# Patient Record
Sex: FEMALE | Race: BLACK OR AFRICAN AMERICAN | NOT HISPANIC OR LATINO | URBAN - METROPOLITAN AREA
[De-identification: names, ages, dates, MRNs, and addresses within clinical notes are randomized per-mention and may not be internally consistent; named-entity substitution may affect disease eponyms.]

---

## 2017-07-31 ENCOUNTER — INPATIENT (INPATIENT)
Facility: HOSPITAL | Age: 22
LOS: 2 days | Discharge: HOME | DRG: 546 | End: 2017-08-03
Attending: INTERNAL MEDICINE | Admitting: INTERNAL MEDICINE
Payer: COMMERCIAL

## 2017-07-31 VITALS
DIASTOLIC BLOOD PRESSURE: 68 MMHG | OXYGEN SATURATION: 97 % | SYSTOLIC BLOOD PRESSURE: 102 MMHG | HEIGHT: 66 IN | HEART RATE: 103 BPM | RESPIRATION RATE: 20 BRPM | WEIGHT: 117.95 LBS | TEMPERATURE: 101 F

## 2017-07-31 RX ORDER — KETOROLAC TROMETHAMINE 30 MG/ML
30 SYRINGE (ML) INJECTION ONCE
Qty: 0 | Refills: 0 | Status: DISCONTINUED | OUTPATIENT
Start: 2017-07-31 | End: 2017-07-31

## 2017-07-31 NOTE — ED ADULT TRIAGE NOTE - CHIEF COMPLAINT QUOTE
came in with c/o joint pain sent for further evaluation from urgent care cause pt is also c/o palpitations and chills

## 2017-08-01 DIAGNOSIS — N39.0 URINARY TRACT INFECTION, SITE NOT SPECIFIED: ICD-10-CM

## 2017-08-01 DIAGNOSIS — M25.50 PAIN IN UNSPECIFIED JOINT: ICD-10-CM

## 2017-08-01 DIAGNOSIS — Z29.9 ENCOUNTER FOR PROPHYLACTIC MEASURES, UNSPECIFIED: ICD-10-CM

## 2017-08-01 LAB
ALBUMIN SERPL ELPH-MCNC: 2.6 G/DL — LOW (ref 3.5–5)
ALBUMIN SERPL ELPH-MCNC: 2.8 G/DL — LOW (ref 3.5–5)
ALP SERPL-CCNC: 72 U/L — SIGNIFICANT CHANGE UP (ref 40–120)
ALP SERPL-CCNC: 78 U/L — SIGNIFICANT CHANGE UP (ref 40–120)
ALT FLD-CCNC: 21 U/L DA — SIGNIFICANT CHANGE UP (ref 10–60)
ALT FLD-CCNC: 22 U/L DA — SIGNIFICANT CHANGE UP (ref 10–60)
ANION GAP SERPL CALC-SCNC: 5 MMOL/L — SIGNIFICANT CHANGE UP (ref 5–17)
ANION GAP SERPL CALC-SCNC: 5 MMOL/L — SIGNIFICANT CHANGE UP (ref 5–17)
ANTI-RIBONUCLEAR PROTEIN: 6.6 AI — HIGH
APPEARANCE UR: CLEAR — SIGNIFICANT CHANGE UP
AST SERPL-CCNC: 28 U/L — SIGNIFICANT CHANGE UP (ref 10–40)
AST SERPL-CCNC: 29 U/L — SIGNIFICANT CHANGE UP (ref 10–40)
BASOPHILS # BLD AUTO: 0 K/UL — SIGNIFICANT CHANGE UP (ref 0–0.2)
BASOPHILS # BLD AUTO: 0 K/UL — SIGNIFICANT CHANGE UP (ref 0–0.2)
BASOPHILS NFR BLD AUTO: 0.4 % — SIGNIFICANT CHANGE UP (ref 0–2)
BASOPHILS NFR BLD AUTO: 1 % — SIGNIFICANT CHANGE UP (ref 0–2)
BILIRUB SERPL-MCNC: 0.3 MG/DL — SIGNIFICANT CHANGE UP (ref 0.2–1.2)
BILIRUB SERPL-MCNC: 0.4 MG/DL — SIGNIFICANT CHANGE UP (ref 0.2–1.2)
BILIRUB UR-MCNC: NEGATIVE — SIGNIFICANT CHANGE UP
BUN SERPL-MCNC: 8 MG/DL — SIGNIFICANT CHANGE UP (ref 7–18)
BUN SERPL-MCNC: 9 MG/DL — SIGNIFICANT CHANGE UP (ref 7–18)
C TRACH RRNA SPEC QL NAA+PROBE: SIGNIFICANT CHANGE UP
CALCIUM SERPL-MCNC: 8.1 MG/DL — LOW (ref 8.4–10.5)
CALCIUM SERPL-MCNC: 8.3 MG/DL — LOW (ref 8.4–10.5)
CCP IGG SERPL-ACNC: 11 UNITS — SIGNIFICANT CHANGE UP (ref 0–19)
CHLORIDE SERPL-SCNC: 102 MMOL/L — SIGNIFICANT CHANGE UP (ref 96–108)
CHLORIDE SERPL-SCNC: 105 MMOL/L — SIGNIFICANT CHANGE UP (ref 96–108)
CHOLEST SERPL-MCNC: 123 MG/DL — SIGNIFICANT CHANGE UP (ref 10–199)
CK SERPL-CCNC: 47 U/L — SIGNIFICANT CHANGE UP (ref 21–215)
CO2 SERPL-SCNC: 28 MMOL/L — SIGNIFICANT CHANGE UP (ref 22–31)
CO2 SERPL-SCNC: 30 MMOL/L — SIGNIFICANT CHANGE UP (ref 22–31)
COLOR SPEC: YELLOW — SIGNIFICANT CHANGE UP
CREAT SERPL-MCNC: 0.68 MG/DL — SIGNIFICANT CHANGE UP (ref 0.5–1.3)
CREAT SERPL-MCNC: 0.73 MG/DL — SIGNIFICANT CHANGE UP (ref 0.5–1.3)
DIFF PNL FLD: NEGATIVE — SIGNIFICANT CHANGE UP
ENA SM AB FLD QL: >8 AI — HIGH
EOSINOPHIL # BLD AUTO: 0.1 K/UL — SIGNIFICANT CHANGE UP (ref 0–0.5)
EOSINOPHIL # BLD AUTO: 0.2 K/UL — SIGNIFICANT CHANGE UP (ref 0–0.5)
EOSINOPHIL NFR BLD AUTO: 3.4 % — SIGNIFICANT CHANGE UP (ref 0–6)
EOSINOPHIL NFR BLD AUTO: 3.6 % — SIGNIFICANT CHANGE UP (ref 0–6)
ERYTHROCYTE [SEDIMENTATION RATE] IN BLOOD: 82 MM/HR — HIGH (ref 0–15)
FOLATE SERPL-MCNC: 13.1 NG/ML — SIGNIFICANT CHANGE UP (ref 4.8–24.2)
GLUCOSE SERPL-MCNC: 102 MG/DL — HIGH (ref 70–99)
GLUCOSE SERPL-MCNC: 89 MG/DL — SIGNIFICANT CHANGE UP (ref 70–99)
GLUCOSE UR QL: NEGATIVE — SIGNIFICANT CHANGE UP
HAV IGM SER-ACNC: SIGNIFICANT CHANGE UP
HBA1C BLD-MCNC: 5.4 % — SIGNIFICANT CHANGE UP (ref 4–5.6)
HBV CORE IGM SER-ACNC: SIGNIFICANT CHANGE UP
HBV SURFACE AG SER-ACNC: SIGNIFICANT CHANGE UP
HCG UR QL: NEGATIVE — SIGNIFICANT CHANGE UP
HCT VFR BLD CALC: 33.5 % — LOW (ref 34.5–45)
HCT VFR BLD CALC: 35.7 % — SIGNIFICANT CHANGE UP (ref 34.5–45)
HCV AB S/CO SERPL IA: 0.13 S/CO — SIGNIFICANT CHANGE UP
HCV AB SERPL-IMP: SIGNIFICANT CHANGE UP
HDLC SERPL-MCNC: 31 MG/DL — LOW (ref 40–125)
HGB BLD-MCNC: 10.9 G/DL — LOW (ref 11.5–15.5)
HGB BLD-MCNC: 11.6 G/DL — SIGNIFICANT CHANGE UP (ref 11.5–15.5)
KETONES UR-MCNC: NEGATIVE — SIGNIFICANT CHANGE UP
LEUKOCYTE ESTERASE UR-ACNC: NEGATIVE — SIGNIFICANT CHANGE UP
LIPID PNL WITH DIRECT LDL SERPL: 78 MG/DL — SIGNIFICANT CHANGE UP
LYMPHOCYTES # BLD AUTO: 1 K/UL — SIGNIFICANT CHANGE UP (ref 1–3.3)
LYMPHOCYTES # BLD AUTO: 1.3 K/UL — SIGNIFICANT CHANGE UP (ref 1–3.3)
LYMPHOCYTES # BLD AUTO: 27 % — SIGNIFICANT CHANGE UP (ref 13–44)
LYMPHOCYTES # BLD AUTO: 28.6 % — SIGNIFICANT CHANGE UP (ref 13–44)
MAGNESIUM SERPL-MCNC: 2.1 MG/DL — SIGNIFICANT CHANGE UP (ref 1.6–2.6)
MCHC RBC-ENTMCNC: 29.2 PG — SIGNIFICANT CHANGE UP (ref 27–34)
MCHC RBC-ENTMCNC: 29.2 PG — SIGNIFICANT CHANGE UP (ref 27–34)
MCHC RBC-ENTMCNC: 32.6 GM/DL — SIGNIFICANT CHANGE UP (ref 32–36)
MCHC RBC-ENTMCNC: 32.6 GM/DL — SIGNIFICANT CHANGE UP (ref 32–36)
MCV RBC AUTO: 89.4 FL — SIGNIFICANT CHANGE UP (ref 80–100)
MCV RBC AUTO: 89.6 FL — SIGNIFICANT CHANGE UP (ref 80–100)
MONOCYTES # BLD AUTO: 0.2 K/UL — SIGNIFICANT CHANGE UP (ref 0–0.9)
MONOCYTES # BLD AUTO: 0.3 K/UL — SIGNIFICANT CHANGE UP (ref 0–0.9)
MONOCYTES NFR BLD AUTO: 5.1 % — SIGNIFICANT CHANGE UP (ref 2–14)
MONOCYTES NFR BLD AUTO: 5.7 % — SIGNIFICANT CHANGE UP (ref 2–14)
N GONORRHOEA RRNA SPEC QL NAA+PROBE: SIGNIFICANT CHANGE UP
NEUTROPHILS # BLD AUTO: 2.4 K/UL — SIGNIFICANT CHANGE UP (ref 1.8–7.4)
NEUTROPHILS # BLD AUTO: 2.9 K/UL — SIGNIFICANT CHANGE UP (ref 1.8–7.4)
NEUTROPHILS NFR BLD AUTO: 61.2 % — SIGNIFICANT CHANGE UP (ref 43–77)
NEUTROPHILS NFR BLD AUTO: 64 % — SIGNIFICANT CHANGE UP (ref 43–77)
NITRITE UR-MCNC: NEGATIVE — SIGNIFICANT CHANGE UP
PH UR: 8 — SIGNIFICANT CHANGE UP (ref 5–8)
PHOSPHATE SERPL-MCNC: 4.1 MG/DL — SIGNIFICANT CHANGE UP (ref 2.5–4.5)
PLATELET # BLD AUTO: 205 K/UL — SIGNIFICANT CHANGE UP (ref 150–400)
PLATELET # BLD AUTO: 228 K/UL — SIGNIFICANT CHANGE UP (ref 150–400)
POTASSIUM SERPL-MCNC: 4 MMOL/L — SIGNIFICANT CHANGE UP (ref 3.5–5.3)
POTASSIUM SERPL-MCNC: 4 MMOL/L — SIGNIFICANT CHANGE UP (ref 3.5–5.3)
POTASSIUM SERPL-SCNC: 4 MMOL/L — SIGNIFICANT CHANGE UP (ref 3.5–5.3)
POTASSIUM SERPL-SCNC: 4 MMOL/L — SIGNIFICANT CHANGE UP (ref 3.5–5.3)
PROT SERPL-MCNC: 8.9 G/DL — HIGH (ref 6–8.3)
PROT SERPL-MCNC: 9.3 G/DL — HIGH (ref 6–8.3)
PROT UR-MCNC: NEGATIVE — SIGNIFICANT CHANGE UP
RBC # BLD: 3.74 M/UL — LOW (ref 3.8–5.2)
RBC # BLD: 3.99 M/UL — SIGNIFICANT CHANGE UP (ref 3.8–5.2)
RBC # FLD: 12.2 % — SIGNIFICANT CHANGE UP (ref 10.3–14.5)
RBC # FLD: 12.4 % — SIGNIFICANT CHANGE UP (ref 10.3–14.5)
RF+CCP IGG SER-IMP: NEGATIVE — SIGNIFICANT CHANGE UP
RHEUMATOID FACT SERPL-ACNC: 15 IU/ML — HIGH (ref 0–13.9)
SODIUM SERPL-SCNC: 137 MMOL/L — SIGNIFICANT CHANGE UP (ref 135–145)
SODIUM SERPL-SCNC: 138 MMOL/L — SIGNIFICANT CHANGE UP (ref 135–145)
SP GR SPEC: 1.01 — SIGNIFICANT CHANGE UP (ref 1.01–1.02)
SPECIMEN SOURCE: SIGNIFICANT CHANGE UP
TOTAL CHOLESTEROL/HDL RATIO MEASUREMENT: 4 RATIO — SIGNIFICANT CHANGE UP (ref 3.3–7.1)
TRIGL SERPL-MCNC: 72 MG/DL — SIGNIFICANT CHANGE UP (ref 10–149)
TSH SERPL-MCNC: 3.65 UU/ML — SIGNIFICANT CHANGE UP (ref 0.34–4.82)
URATE SERPL-MCNC: 3.1 MG/DL — SIGNIFICANT CHANGE UP (ref 2.5–7)
UROBILINOGEN FLD QL: NEGATIVE — SIGNIFICANT CHANGE UP
VIT B12 SERPL-MCNC: 341 PG/ML — SIGNIFICANT CHANGE UP (ref 243–894)
WBC # BLD: 3.7 K/UL — LOW (ref 3.8–10.5)
WBC # BLD: 4.7 K/UL — SIGNIFICANT CHANGE UP (ref 3.8–10.5)
WBC # FLD AUTO: 3.7 K/UL — LOW (ref 3.8–10.5)
WBC # FLD AUTO: 4.7 K/UL — SIGNIFICANT CHANGE UP (ref 3.8–10.5)

## 2017-08-01 PROCEDURE — 71020: CPT | Mod: 26

## 2017-08-01 PROCEDURE — 99285 EMERGENCY DEPT VISIT HI MDM: CPT | Mod: 25

## 2017-08-01 PROCEDURE — 73030 X-RAY EXAM OF SHOULDER: CPT | Mod: 26,50

## 2017-08-01 PROCEDURE — 73130 X-RAY EXAM OF HAND: CPT | Mod: 26,50

## 2017-08-01 PROCEDURE — 73522 X-RAY EXAM HIPS BI 3-4 VIEWS: CPT | Mod: 26

## 2017-08-01 RX ORDER — SODIUM CHLORIDE 9 MG/ML
1000 INJECTION INTRAMUSCULAR; INTRAVENOUS; SUBCUTANEOUS
Qty: 0 | Refills: 0 | Status: DISCONTINUED | OUTPATIENT
Start: 2017-08-01 | End: 2017-08-03

## 2017-08-01 RX ORDER — AZITHROMYCIN 500 MG/1
100 TABLET, FILM COATED ORAL ONCE
Qty: 0 | Refills: 0 | Status: DISCONTINUED | OUTPATIENT
Start: 2017-08-01 | End: 2017-08-01

## 2017-08-01 RX ORDER — IBUPROFEN 200 MG
400 TABLET ORAL
Qty: 0 | Refills: 0 | Status: DISCONTINUED | OUTPATIENT
Start: 2017-08-01 | End: 2017-08-03

## 2017-08-01 RX ORDER — CEFTRIAXONE 500 MG/1
1 INJECTION, POWDER, FOR SOLUTION INTRAMUSCULAR; INTRAVENOUS ONCE
Qty: 0 | Refills: 0 | Status: COMPLETED | OUTPATIENT
Start: 2017-08-01 | End: 2017-08-01

## 2017-08-01 RX ORDER — AZITHROMYCIN 500 MG/1
1000 TABLET, FILM COATED ORAL ONCE
Qty: 0 | Refills: 0 | Status: COMPLETED | OUTPATIENT
Start: 2017-08-01 | End: 2017-08-01

## 2017-08-01 RX ADMIN — Medication 400 MILLIGRAM(S): at 18:13

## 2017-08-01 RX ADMIN — Medication 40 MILLIGRAM(S): at 14:48

## 2017-08-01 RX ADMIN — CEFTRIAXONE 100 GRAM(S): 500 INJECTION, POWDER, FOR SOLUTION INTRAMUSCULAR; INTRAVENOUS at 02:30

## 2017-08-01 RX ADMIN — AZITHROMYCIN 1000 MILLIGRAM(S): 500 TABLET, FILM COATED ORAL at 02:31

## 2017-08-01 RX ADMIN — Medication 40 MILLIGRAM(S): at 22:27

## 2017-08-01 RX ADMIN — Medication 30 MILLIGRAM(S): at 00:21

## 2017-08-01 RX ADMIN — Medication 30 MILLIGRAM(S): at 00:16

## 2017-08-01 RX ADMIN — SODIUM CHLORIDE 75 MILLILITER(S): 9 INJECTION INTRAMUSCULAR; INTRAVENOUS; SUBCUTANEOUS at 22:27

## 2017-08-01 RX ADMIN — Medication 400 MILLIGRAM(S): at 14:48

## 2017-08-01 RX ADMIN — SODIUM CHLORIDE 75 MILLILITER(S): 9 INJECTION INTRAMUSCULAR; INTRAVENOUS; SUBCUTANEOUS at 06:01

## 2017-08-01 NOTE — H&P ADULT - ATTENDING COMMENTS
22 Y/O F, lives with grandparents, no PMHx , presented with multiple joint pain , x 2 weeks. According to patient she has muscle spasm about 6 months ago , given the muscle relaxant that results in mild improvement. About 2 weeks ago she noticed right and left shoulder, right hip joint pain that started about 2 weeks ago, associated with difficulty in performing daily activities , walking, dressing, unable to lie down.  6 month ago she has bilateral bursitis of elbow, treated with conservative measures. About couple of weeks ago she went to urgent care to similar symptoms , also she has headache at that time , CT scan done that was negative. She is treated for UTI about a week ago, given the nitrofurantoin BID x 7 days , remaining treatment for 3 days. She is in monogamous relationship with partner since last 3 yrs, uses protection infrequently. Denies any fever, runny nose, neck stiffness, chest pain, cough, abdominal pain, travel, hiking, rash , tick bite, skin lesions, vaginal discharge vaginal lesions, similar complains in partner, family history of joint disease or autoimmune conditions, joint pain migrating from one joint to other, photosensitivity , chest pain .   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 She was sent to rheumatologist yesterday by her PCP, after seen in her office recommended to go to ED for further evaluation As per ED note ( Pt sent in for possible disseminated gonorrhea vs. lyme' s disease) .     pt seen in bed, vitals stable except for fever, physical exam reveals lungs cta b/l, heart s1s2, abd soft nd nt bs+, ext pip swelling and tenderness, hip tender and tenderness on flexion, no edema. labs and diagnostic test result reviewed.    assessment  --  polyarthritis, r/o viral synd, r/o rheumatalogical disorder, r/o scd, r/o infectious process     plan  --  admit to med, solumedrol  cont preadmit home meds, gi and dvt profilaxis,  cbc, bmp, mg, phos, lipids, tsh, bld cx, ua, ucx, KENAN, CRP, CCP, RF, Hep panel, aldolase, CK, also Chlamydia GC antibodies, borrelia antibodies, anti smith, c3, c4, antids dna, antismith, spep    f/u xray hands, shoulder, hips    rheum cons

## 2017-08-01 NOTE — PROGRESS NOTE ADULT - SUBJECTIVE AND OBJECTIVE BOX
PHYSICAL EXAM:  GENERAL: NAD, A&O X 3  NERVOUS SYSTEM:  Alert & Oriented X3, Good concentration; Motor Strength 5/5 B/L upper and lower extremities; DTRs 2+ intact and symmetric  CHEST/LUNG: Clear to percussion bilaterally; No rales, rhonchi, wheezing, or rubs  HEART: Regular rate and rhythm; No murmurs, rubs, or gallops  ABDOMEN: Soft, Nontender, Nondistended; Bowel sounds present  EXTREMITIES:  2+ Peripheral Pulses, No clubbing, cyanosis, or edema  LYMPH: No lymphadenopathy noted  SKIN: No rashes or lesions

## 2017-08-01 NOTE — H&P ADULT - NSHPLABSRESULTS_GEN_ALL_CORE
11.6   4.7   )-----------( 228      ( 01 Aug 2017 00:29 )             35.7   08-    137  |  102  |  9   ----------------------------<  89  4.0   |  30  |  0.68    Ca    8.1<L>      01 Aug 2017 00:29    TPro  9.3<H>  /  Alb  2.8<L>  /  TBili  0.3  /  DBili  x   /  AST  29  /  ALT  22  /  AlkPhos  78  08-  Urinalysis Basic - ( 01 Aug 2017 00:29 )    Color: Yellow / Appearance: Clear / S.015 / pH: x  Gluc: x / Ketone: Negative  / Bili: Negative / Urobili: Negative   Blood: x / Protein: Negative / Nitrite: Negative   Leuk Esterase: Negative / RBC: x / WBC x   Sq Epi: x / Non Sq Epi: x / Bacteria: x

## 2017-08-01 NOTE — H&P ADULT - NSHPPHYSICALEXAM_GEN_ALL_CORE
PHYSICAL EXAM:  GENERAL: NAD,   HEAD:  , Normocephalic  EYES:  conjunctiva and sclera clear  NECK: Supple, No JVD    NERVOUS SYSTEM:  Alert & Oriented X3,   CHEST/LUNG: Clear to auscultation bilaterally;   HEART: S1 S2+;   ABDOMEN: Soft, Nontender, Nondistended; Bowel sounds present  EXTREMITIES: no cyanosis; no edema; no calf tenderness  Joint: Right and left shoulder , left hip pain

## 2017-08-01 NOTE — PROGRESS NOTE ADULT - ASSESSMENT
20 Y/O F, lives with grandparents, no PMHx , presented with multiple joint pain, x 2 weeks. According to patient she has muscle spasm about 6 months ago, given the muscle relaxant that results in mild improvement. About 2 weeks ago she noticed right and left shoulder, right hip joint pain, associated with difficulty in performing daily activities  walking, dressing, unable to lie down.  6 month ago she had bilateral bursitis of elbow, treated with conservative measures. About two weeks ago she went to urgent care to similar symptoms , also she had headache at that time. CT scan done was negative. She completed treatment for a UTI one week ago with nitrofurantoin, BID X 7 days.    She is in monogamous relationship with partner for 3 yrs, uses protection infrequently. She denied fever, runny nose, neck stiffness, chest pain, cough, abdominal pain, travel, hiking, rash , tick bite, skin lesions, vaginal discharge vaginal lesions, similar complaints in partner, family history of joint disease or autoimmune conditions, joint pain migrating from one joint to other, photosensitivity , chest pain.     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  She was sent to rheumatologist one day ago who sent her to the ED.      In the ED patient vitals shows T max of 101.5, , RR20.  UA negative and received Zithromax, Rocephin, Toradol X 1.. ESR 82.

## 2017-08-01 NOTE — PROGRESS NOTE ADULT - PROBLEM SELECTOR PLAN 1
Multiple joint pain. (left hip, bilateral shoulders).  Morning stiffness, muscle pain, interfering with daily activities   Physical exam pertinent for tenderness of bilateral wrist, no erythema, warmth left hip and bilateral shoulder.  Multiple labs ordered/pending including CTP, KENAN, DS DNA, Complimen C3/C4  KAZ, SSA/SSB, Ace level pending  X-ray bilateral shoulder showed:  " No evidence for an acute fracture, or dislocation.The joint spaces are preserved.  The osseous mineralization is within normal limits.  No abnormal soft tissue calcification."  X-rays bilateral hip showed:  "No evidence for an acute fracture, or dislocation.  The hip joint spaces are preserved bilaterally.  The SI joints appear unremarkable. The osseous mineralization is within normal limits."  CXR showed:  "No radiographic evidence for acute cardiopulmonary disease."  F/up on KENAN, CRP, CCP, RF, Hep panel, aldolase, CK, also Chlamydia GC antibodies, borrelia antibodies.   Dr. Storey, attending, evaluated  Pt started on NSAIDs/steroids

## 2017-08-01 NOTE — H&P ADULT - PROBLEM SELECTOR PLAN 1
Multiple joint pain ( < 3 , left hip pain, right and left shoulder pain)   Sexually active, monogamous   Morning stiffness, muscle pain, interfering with daily activities   Physical exam pertinent for tenderness of bilateral wrist , no erythema, warmth of joint ( right and left shoulder, left hip joint )   Sent by rheumatologist for concern of disseminated gonorrhea vs. lyme' s disease, as by ED note   patient denies any vaginal symptoms, rash, skin lesions  Fever of about 101 , Hr 103   ESR elevated to 81  Patient got the Zithromax and Rocephin for the concern of disseminate gonnorrhea infection  F/up on KENAN, CRP, CCP, RF, Hep panel, aldolase, CK, also Chlamydia GC antibodies, borrelia antibodies Multiple joint pain ( < 3 , left hip pain, right and left shoulder pain)   Sexually active, monogamous   Morning stiffness, muscle pain, interfering with daily activities   Physical exam pertinent for tenderness of bilateral wrist , no erythema, warmth of joint ( right and left shoulder, left hip joint )   Sent by rheumatologist for concern of disseminated gonorrhea vs. lyme' s disease, as by ED note   patient denies any vaginal symptoms, rash, skin lesions  Fever of about 101 , Hr 103   ESR elevated to 81  Patient got the Zithromax and Rocephin for the concern of disseminate gonorrhea infection  F/up on KENAN, CRP, CCP, RF, Hep panel, aldolase, CK, also Chlamydia GC antibodies, borrelia antibodies, anti yanez  Will hold the antibiotic for now as could be a viral etiology  F/up on X Xray ( bilateral shoulder, hands, left hip)   Will give NSAIDs for now   Could be a flare of underlying condition , may benefit from steroid   Rheumatology consult for Dr Ruiz Multiple joint pain ( < 3 , left hip pain, right and left shoulder pain)   Sexually active, monogamous   Morning stiffness, muscle pain, interfering with daily activities   Physical exam pertinent for tenderness of bilateral wrist , no erythema, warmth of joint ( right and left shoulder, left hip joint )   Sent by rheumatologist for concern of disseminated gonorrhea vs. lyme' s disease, as by ED note   patient denies any vaginal symptoms, rash, skin lesions  Fever of about 101 , Hr 103   ESR elevated to 81  Patient got the Zithromax and Rocephin for the concern of disseminate gonorrhea infection  F/up on KENAN, CRP, CCP, RF, Hep panel, aldolase, CK, also Chlamydia GC antibodies, borrelia antibodies, anti yanez  Will hold the antibiotic for now as could be a viral etiology  F/up on X Xray ( bilateral shoulder, hands, left hip)   Will give NSAIDs for now   Could be a flare of underlying condition , may benefit from steroid   Also multiple joint pain, will check the QuantiFeron gold TB   Rheumatology consult for Dr Ruiz

## 2017-08-01 NOTE — H&P ADULT - HISTORY OF PRESENT ILLNESS
20 Y/O F, lives with grandparents, no PMHx , presented with multiple joint pain , x 2 weeks. According to patient she has muscle spasm about 6 months ago , given the muscle relaxant that results in mild improvement. About 2 weeks ago she noticed right and left shoulder, right hip joint pain that started about 2 weeks ago, associated with difficulty in performing daily activities , walking, dressing, unable to lie down.  6 month ago she has bilateral bursitis of elbow, treated with conservative measures. About couple of weeks ago she went to urgent care to similar symptoms , also she has headache at that time , CT scan done that was negative. She is treated for UTI about a week ago, given the nitrofurantoin BID x 7 days , remaining treatment for 3 days. She is in monogamous relationship with partner since last 3 yrs, uses protection infrequently. Denies any fever, runny nose, neck stiffness, chest pain, cough, abdominal pain, travel, hiking, rash , tick bite, skin lesions, vaginal discharge vaginal lesions, similar complains in partner, family history of joint disease or autoimmune conditions, joint pain migrating from one joint to other, photosensitivity , chest pain .   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 She was sent to rheumatologist yesterday by her PCP, after seen in her office recommended to go to ED for further evaluation As per ED note ( Pt sent in for possible disseminated gonorrhea vs. lyme' s disease) .

## 2017-08-01 NOTE — H&P ADULT - NSHPREVIEWOFSYSTEMS_GEN_ALL_CORE
CONSTITUTIONAL: No fever, weakness  EYES: no acute visual disturbances  NECK: No pain or stiffness  RESPIRATORY: No cough; No shortness of breath  CARDIOVASCULAR: No chest pain, no palpitations  GASTROINTESTINAL: No pain. No nausea or vomiting; No diarrhea   NEUROLOGICAL: Occasional  headache  Joint: Multiple joint pain , morning stiffness

## 2017-08-01 NOTE — ED PROVIDER NOTE - OBJECTIVE STATEMENT
20 y/o female with no significant PMHx presents to the ED c/o joint pain x 2 weeks. Pt notes bilateral wrist pain, R shoulder pain and back pain as well as chills x today. Pt is currently sexually active with intermittent use of protection. Pt denies numbness, tingling, vaginal bleeding/discharge or any other complaints. Pt was recently treated for UTI (on Cipro). Seen and referred to ED by Rheumatologist Dr. Kenneth Sykes. SHANNONDA.

## 2017-08-01 NOTE — H&P ADULT - ASSESSMENT
In the ED patient vitals shows T max of 101.5, , RR20/97, blood cultures sent, UA negative, got Zithromax Rocephin , Toradol once. ESR 82. Patient multiple test sent.

## 2017-08-01 NOTE — ED PROVIDER NOTE - MEDICAL DECISION MAKING DETAILS
20 y/o female presents to the ED co joint pain x 2 weeks. Pt sent in for possible disseminated gonorrhea vs. lyme' s disease. Will admit with IV fluids and abx.

## 2017-08-01 NOTE — H&P ADULT - PROBLEM SELECTOR PLAN 3
Improve VTE score is 0, with 3 month risk of VTE is 0.4, patient ambulating , no need for DVT prophylaxis

## 2017-08-02 LAB
ALDOLASE SERPL-CCNC: 6.3 U/L — SIGNIFICANT CHANGE UP (ref 3.3–10.3)
ANION GAP SERPL CALC-SCNC: 4 MMOL/L — LOW (ref 5–17)
B BURGDOR C6 AB SER-ACNC: NEGATIVE — SIGNIFICANT CHANGE UP
B BURGDOR IGG+IGM SER-ACNC: 0.2 INDEX — SIGNIFICANT CHANGE UP (ref 0.01–0.89)
BUN SERPL-MCNC: 14 MG/DL — SIGNIFICANT CHANGE UP (ref 7–18)
C3 SERPL-MCNC: 90 MG/DL — SIGNIFICANT CHANGE UP (ref 80–180)
C4 SERPL-MCNC: 12 MG/DL — SIGNIFICANT CHANGE UP (ref 10–45)
CALCIUM SERPL-MCNC: 8.1 MG/DL — LOW (ref 8.4–10.5)
CHLORIDE SERPL-SCNC: 110 MMOL/L — HIGH (ref 96–108)
CO2 SERPL-SCNC: 27 MMOL/L — SIGNIFICANT CHANGE UP (ref 22–31)
CREAT SERPL-MCNC: 0.69 MG/DL — SIGNIFICANT CHANGE UP (ref 0.5–1.3)
DSDNA AB FLD-ACNC: 1.2 AI — HIGH
DSDNA AB SER-ACNC: 414 IU/ML — HIGH
ENA SS-A AB FLD IA-ACNC: >8 AI — HIGH
EOSINOPHIL NFR BLD AUTO: 1 % — SIGNIFICANT CHANGE UP (ref 0–6)
GLUCOSE SERPL-MCNC: 136 MG/DL — HIGH (ref 70–99)
HCT VFR BLD CALC: 32.4 % — LOW (ref 34.5–45)
HGB BLD-MCNC: 10.5 G/DL — LOW (ref 11.5–15.5)
LYMPHOCYTES # BLD AUTO: 21 % — SIGNIFICANT CHANGE UP (ref 13–44)
M TB TUBERC IFN-G BLD QL: -0.03 IU/ML — SIGNIFICANT CHANGE UP
M TB TUBERC IFN-G BLD QL: 0.17 IU/ML — SIGNIFICANT CHANGE UP
M TB TUBERC IFN-G BLD QL: NEGATIVE — SIGNIFICANT CHANGE UP
MCHC RBC-ENTMCNC: 29.5 PG — SIGNIFICANT CHANGE UP (ref 27–34)
MCHC RBC-ENTMCNC: 32.4 GM/DL — SIGNIFICANT CHANGE UP (ref 32–36)
MCV RBC AUTO: 91.2 FL — SIGNIFICANT CHANGE UP (ref 80–100)
MITOGEN IGNF BCKGRD COR BLD-ACNC: 1.66 IU/ML — SIGNIFICANT CHANGE UP
MONOCYTES NFR BLD AUTO: 8 % — SIGNIFICANT CHANGE UP (ref 2–14)
NEUTROPHILS NFR BLD AUTO: 70 % — SIGNIFICANT CHANGE UP (ref 43–77)
PLATELET # BLD AUTO: 191 K/UL — SIGNIFICANT CHANGE UP (ref 150–400)
POTASSIUM SERPL-MCNC: 4.6 MMOL/L — SIGNIFICANT CHANGE UP (ref 3.5–5.3)
POTASSIUM SERPL-SCNC: 4.6 MMOL/L — SIGNIFICANT CHANGE UP (ref 3.5–5.3)
RBC # BLD: 3.55 M/UL — LOW (ref 3.8–5.2)
RBC # FLD: 12.4 % — SIGNIFICANT CHANGE UP (ref 10.3–14.5)
SODIUM SERPL-SCNC: 141 MMOL/L — SIGNIFICANT CHANGE UP (ref 135–145)
WBC # BLD: 2.6 K/UL — LOW (ref 3.8–10.5)
WBC # FLD AUTO: 2.6 K/UL — LOW (ref 3.8–10.5)

## 2017-08-02 RX ORDER — POLYETHYLENE GLYCOL 3350 17 G/17G
17 POWDER, FOR SOLUTION ORAL DAILY
Qty: 0 | Refills: 0 | Status: DISCONTINUED | OUTPATIENT
Start: 2017-08-02 | End: 2017-08-03

## 2017-08-02 RX ADMIN — Medication 40 MILLIGRAM(S): at 05:41

## 2017-08-02 RX ADMIN — Medication 400 MILLIGRAM(S): at 18:07

## 2017-08-02 RX ADMIN — Medication 40 MILLIGRAM(S): at 17:28

## 2017-08-02 RX ADMIN — Medication 400 MILLIGRAM(S): at 07:15

## 2017-08-02 RX ADMIN — Medication 400 MILLIGRAM(S): at 05:40

## 2017-08-02 RX ADMIN — POLYETHYLENE GLYCOL 3350 17 GRAM(S): 17 POWDER, FOR SOLUTION ORAL at 17:28

## 2017-08-02 RX ADMIN — Medication 400 MILLIGRAM(S): at 18:06

## 2017-08-02 NOTE — PROGRESS NOTE ADULT - PROBLEM SELECTOR PLAN 2
UA negative/UC negative  (outpatient treatment with nitrofurantoin x 7 days as prescribed by the PCP completed).

## 2017-08-02 NOTE — CONSULT NOTE ADULT - ASSESSMENT
1) Pt. with positive Louise Ab elevated DSDNA,increased RNP, Positive SSA normal C3C4 probable SLE- Pt. much improved on steroids recommend change IV Steroids to P.O 20 bid x 1 week than taper to 35mg x 1 week than 30mg QD consider Plaquenil as outpatient. Pt. moving to [place] advised to f/u with rheumatologist as outpt

## 2017-08-02 NOTE — PROGRESS NOTE ADULT - PROBLEM SELECTOR PLAN 1
Multiple joint pain, 2/2 Lupus   Morning stiffness, muscle pain, interfering with daily activities   Physical exam pertinent for tenderness of bilateral wrist, no erythema, warmth left hip and bilateral shoulder.  Multiple labs ordered/pending including CTP, KENAN, DS DNA, Complimen C3/C4  KAZ, SSA/SSB, Ace level   X-ray bilateral shoulder showed:  " No evidence for an acute fracture, or dislocation.The joint spaces are preserved.  The osseous mineralization is within normal limits.  No abnormal soft tissue calcification."  X-rays bilateral hip showed:  "No evidence for an acute fracture, or dislocation.  The hip joint spaces are preserved bilaterally.  The SI joints appear unremarkable. The osseous mineralization is within normal limits."  CXR showed:  "No radiographic evidence for acute cardiopulmonary disease."  X-ray bilateral hand unremarkable  Dr. Storey, attending evaluated  Pt started on NSAIDS and steroids with improvement.  Anti-Louise, Anti-RNF, and DS DNA all positive and consistent with lupus  Dr. Ruiz, rheumatologist to evaluate tonight

## 2017-08-02 NOTE — CONSULT NOTE ADULT - SUBJECTIVE AND OBJECTIVE BOX
Patient is a 21y old  Female who presents with a chief complaint of multiple joint pain, subjective fever (01 Aug 2017 06:06)      HPI:  22 Y/O F, lives with grandparents, no PMHx , presented with multiple joint pain , x 2 weeks. According to patient she has muscle spasm about 6 months ago , given the muscle relaxant that results in mild improvement. About 2 weeks ago she noticed right and left shoulder, right hip joint pain that started about 2 weeks ago, associated with difficulty in performing daily activities , walking, dressing, unable to lie down.  6 month ago she has bilateral bursitis of elbow, treated with conservative measures. About couple of weeks ago she went to urgent care to similar symptoms , also she has headache at that time , CT scan done that was negative. She is treated for UTI about a week ago, given the nitrofurantoin BID x 7 days , remaining treatment for 3 days. She is in monogamous relationship with partner since last 3 yrs, uses protection infrequently. Denies any fever, runny nose, neck stiffness, chest pain, cough, abdominal pain, travel, hiking, rash , tick bite, skin lesions, vaginal discharge vaginal lesions, similar complains in partner, family history of joint disease or autoimmune conditions, joint pain migrating from one joint to other, photosensitivity , chest pain .   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 She was sent to rheumatologist yesterday by her PCP, after seen in her office recommended to go to ED for further evaluation As per ED note ( Pt sent in for possible disseminated gonorrhea vs. lyme' s disease) . (01 Aug 2017 06:06)        PAST MEDICAL & SURGICAL HISTORY:  UTI (urinary tract infection)      MEDICATIONS  (STANDING):  sodium chloride 0.9%. 1000 milliLiter(s) (75 mL/Hr) IV Continuous <Continuous>  ibuprofen  Tablet 400 milliGRAM(s) Oral two times a day  methylPREDNISolone sodium succinate Injectable 40 milliGRAM(s) IV Push two times a day  polyethylene glycol 3350 17 Gram(s) Oral daily    MEDICATIONS  (PRN):      Allergies    No Known Allergies    Intolerances                              10.5   2.6   )-----------( 191      ( 02 Aug 2017 05:50 )             32.4       08-02    141  |  110<H>  |  14  ----------------------------<  136<H>  4.6   |  27  |  0.69    Ca    8.1<L>      02 Aug 2017 05:50  Phos  4.1       Mg     2.1         TPro  8.9<H>  /  Alb  2.6<L>  /  TBili  0.4  /  DBili  x   /  AST  28  /  ALT  21  /  AlkPhos  72        Urinalysis Basic - ( 01 Aug 2017 00:29 )    Color: Yellow / Appearance: Clear / S.015 / pH: x  Gluc: x / Ketone: Negative  / Bili: Negative / Urobili: Negative   Blood: x / Protein: Negative / Nitrite: Negative   Leuk Esterase: Negative / RBC: x / WBC x   Sq Epi: x / Non Sq Epi: x / Bacteria: x          Vital Signs Last 24 Hrs  T(C): 36.3 (02 Aug 2017 20:41), Max: 36.8 (02 Aug 2017 14:32)  T(F): 97.3 (02 Aug 2017 20:41), Max: 98.3 (02 Aug 2017 14:32)  HR: 75 (02 Aug 2017 20:41) (66 - 75)  BP: 107/69 (02 Aug 2017 20:41) (99/61 - 107/69)  BP(mean): --  RR: 14 (02 Aug 2017 20:41) (14 - 16)  SpO2: 98% (02 Aug 2017 20:41) (98% - 100%)    Physical Exam  Constitutional:Pt. w/o complaints    ENMT:    Respiratory:L-Clear    Cardiovascular:[id number]    Gastrointestinal:+ BS soft nontender     Extremities:-C/C/E    Skin:    Musculoskeletal:no synovitis in peripheral joints

## 2017-08-02 NOTE — PROGRESS NOTE ADULT - SUBJECTIVE AND OBJECTIVE BOX
Patient is a 21y old  Female who presents with a chief complaint of multiple joint pain, subjective fever (01 Aug 2017 06:06)    pt seen in icu [  ], reg med floor [ x  ], bed [ x ], chair at bedside [   ], a+o x3 [ x ], lethargic [  ],  nad [x  ]          Allergies    No Known Allergies        Vitals    T(F): 97.7 (08-02-17 @ 04:55), Max: 101.2 (08-01-17 @ 14:46)  HR: 66 (08-02-17 @ 04:55) (66 - 103)  BP: 99/70 (08-02-17 @ 04:55) (95/64 - 99/70)  RR: 16 (08-02-17 @ 04:55) (14 - 16)  SpO2: 100% (08-02-17 @ 04:55) (98% - 100%)  Wt(kg): --  CAPILLARY BLOOD GLUCOSE          Labs                          10.5   2.6   )-----------( 191      ( 02 Aug 2017 05:50 )             32.4       08-02    141  |  110<H>  |  14  ----------------------------<  136<H>    4.6   |  27  |  0.69    Ca    8.1<L>      02 Aug 2017 05:50  Phos  4.1     08-01  Mg     2.1     08-01    TPro  8.9<H>  /  Alb  2.6<L>  /  TBili  0.4  /  DBili  x   /  AST  28  /  ALT  21  /  AlkPhos  72  08-01      CARDIAC MARKERS ( 01 Aug 2017 10:53 )  x     / x     / 47 U/L / x     / x        Sjogren&#x27;s Syndrome Antibodies (08.01.17 @ 23:31)    Anti SS-A Antibody: >8.0 AI    Anti SS-B Antibody: 1.2: Fluorescent Bead Immunoassay   Reference Ranges for SS-A AND SS-B:   <1.0 AI (negative)   > or =1.0 AI (positive)   Reference values apply  to all ages. AI    Double Stranded DNA Antibody (08.01.17 @ 14:38)    Double Stranded DNA Antibody: 414: Method: EIA            Reference Ranges            Interpretation            < 30      IU/mL     Negative            30 - 75  IU/mL     Borderline            > 75      IU/mL     Positive IU/mL      KAZ Antibody Screening Test (08.01.17 @ 14:38)    SM (Louise) Ab FBIA: >8.0 AI    Anti-Ribonuclear Protein: 6.6: Fluorescent Bead Immunoassy                      Reference Ranges for RNP and SM:                      <1.0 AI (negative)                      > or =1.0 AI (positive)                      Reference values apply to all ages AI    Anti-Ribonuclear Protein: 6.6: Fluorescent Bead Immunoassy                      Reference Ranges for RNP and SM:                      <1.0 AI (negative)                      > or =1.0 AI (positive)                      Reference values apply to all ages AI (08.01.17 @ 14:38)    C4 Complement, Serum (08.01.17 @ 23:31)    C4 Complement, Serum: 12 mg/dL    C3 Complement, Serum (08.01.17 @ 23:31)    C3 Complement, Serum: 90 mg/dL          Acute Hepatitis Panel (08.01.17 @ 14:38)    Hepatitis C Virus S/CO Ratio: 0.13 S/CO    Hepatitis C Virus Interpretation: Nonreact: Hepatitis C AB  S/CO Ratio                        Interpretation  < 1.0                                     Non-Reactive  1.0 - 4.9                           Weakly-Reactive  > 5.0                                 Reactive  Non-Reactive: A person withNonreact: a non-reactive HCV antibody result is  considered uninfected.  No further action is needed unless recent  infection is suspected.  In these cases, consider repeat testing later to  detect seroconversion..  Weakly-Reactive: HCV antibody test is abnormaNonreact: l, HCV RNA Qualitative test  will follow.  Reactive: HCV antibody test is abnormal, HCV RNA Qualitative test will  follow.  Note: HCV antibody testing is performed on the Abbott  system.    Hepatitis B Core IgM Antibody: Nonreact    Hepatitis B Surface Antigen: Nonreact    Hepatitis A IgM Antibody: Nonreact      Chlamydia/GC Nucleic Acid Amplification (08.01.17 @ 09:18)    Source Amp: Urine: Testing on female urine has not been approved by the [place] Food and Drug  Administration (FDA). Performance characteristics of this assay for  testing of female urine have been determined by [facility]. The clinical significance of poUrine: sitive results should be  considered in conjunction with the overall clinical presentation of the  patient. Result is not intended to be used as the sole means for clinical  diagnosis or patient management decisions.    Chlamydia Amplification Result: NotDetec: This assay screens for the presence of Chlamydia trachomatis rRNA using  transcription mediated amplification with the GenPro[employer] Aptima System.  A "Not Detected" result does not preclude the possibility of an infection  with C. trachomatis. If resultsNotDetec: are indeterminate, please submit a new  specimen.  This assay is not intended for the evaluation of suspected sexual abuse  or for other medico-legal reasons. The performance of this test has not  been evaluated in women <16 years of age    GC Amplification Result: NotDetec: This assay screens for the presence of Neisseria gonorrhoeae rRNA using  transcription mediated amplification with the GenProbe Aptima System.  A Not Detected result does not preclude the possibility of an infection  with N. gonorrhoeae. If results arNotDetec: e indeterminate, please submit a new  specimen.  This assay is not intended for the evaluation of suspected sexual abuse  or for other medico-legal reasons. The performance of this test has not  been evaluated in women <16 years of age.        Radiology Results      Meds    MEDICATIONS  (STANDING):  sodium chloride 0.9%. 1000 milliLiter(s) (75 mL/Hr) IV Continuous <Continuous>  ibuprofen  Tablet 400 milliGRAM(s) Oral two times a day  methylPREDNISolone sodium succinate Injectable 40 milliGRAM(s) IV Push two times a day      MEDICATIONS  (PRN):      Physical Exam    Neuro :  no focal deficits  Respiratory: CTA B/L  CV: RRR, S1S2, no murmurs,   Abdominal: Soft, NT, ND +BS,  Extremities: No edema, + peripheral pulses    ASSESSMENT    polyarthritis,   Arthralgia  possible SLE  r/o viral synd,  r/o infectious process   r/o sickle cell disease    PLAN    rheumatoid, anti ssa, anti ssb anti-smith, anti ds dna, anti rnp result positive noted above  complement neg result noted above  chlamydia- gonocco neg noted above  hepatitis panel neg noted above  rheum cons  cont solumedrol

## 2017-08-02 NOTE — PROGRESS NOTE ADULT - SUBJECTIVE AND OBJECTIVE BOX
NP Note discussed with  Primary Attending    Patient is a 21y old  Female who presents with a chief complaint of multiple joint pain, subjective fever (01 Aug 2017 06:06)      INTERVAL HPI/OVERNIGHT EVENTS: no new complaints    MEDICATIONS  (STANDING):  sodium chloride 0.9%. 1000 milliLiter(s) (75 mL/Hr) IV Continuous <Continuous>  ibuprofen  Tablet 400 milliGRAM(s) Oral two times a day  methylPREDNISolone sodium succinate Injectable 40 milliGRAM(s) IV Push two times a day  polyethylene glycol 3350 17 Gram(s) Oral daily    MEDICATIONS  (PRN):      __________________________________________________  REVIEW OF SYSTEMS:    CONSTITUTIONAL: No fever,   EYES: no acute visual disturbances  NECK: No pain or stiffness  RESPIRATORY: No cough; No shortness of breath  CARDIOVASCULAR: No chest pain, no palpitations  GASTROINTESTINAL: No pain. No nausea or vomiting; No diarrhea   NEUROLOGICAL: No headache or numbness, no tremors  MUSCULOSKELETAL: Left hip pain/improved  GENITOURINARY: no dysuria, no frequency, no hesitancy  PSYCHIATRY: no depression , no anxiety  ALL OTHER  ROS negative        Vital Signs Last 24 Hrs  T(C): 36.8 (02 Aug 2017 14:32), Max: 36.9 (01 Aug 2017 20:44)  T(F): 98.3 (02 Aug 2017 14:32), Max: 98.4 (01 Aug 2017 20:44)  HR: 70 (02 Aug 2017 14:32) (66 - 81)  BP: 99/61 (02 Aug 2017 14:32) (98/58 - 99/70)  BP(mean): --  RR: 14 (02 Aug 2017 14:32) (14 - 16)  SpO2: 100% (02 Aug 2017 14:32) (100% - 100%)    ________________________________________________  PHYSICAL EXAM:  GENERAL: NAD  HEENT: Normocephalic;  conjunctivae and sclerae clear; moist mucous membranes;   NECK : supple  CHEST/LUNG: Clear to auscultation bilaterally with good air entry   HEART: S1 S2  regular; no murmurs, gallops or rubs  ABDOMEN: Soft, Nontender, Nondistended; Bowel sounds present  EXTREMITIES: mild left hip pain  SKIN: warm and dry; no rash  NERVOUS SYSTEM:  Awake and alert; Oriented  to place, person and time ; no new deficits    _________________________________________________  LABS:                        10.5   2.6   )-----------( 191      ( 02 Aug 2017 05:50 )             32.4     08-    141  |  110<H>  |  14  ----------------------------<  136<H>  4.6   |  27  |  0.69    Ca    8.1<L>      02 Aug 2017 05:50  Phos  4.1       Mg     2.1         TPro  8.9<H>  /  Alb  2.6<L>  /  TBili  0.4  /  DBili  x   /  AST  28  /  ALT  21  /  AlkPhos  72  -      Urinalysis Basic - ( 01 Aug 2017 00:29 )    Color: Yellow / Appearance: Clear / S.015 / pH: x  Gluc: x / Ketone: Negative  / Bili: Negative / Urobili: Negative   Blood: x / Protein: Negative / Nitrite: Negative   Leuk Esterase: Negative / RBC: x / WBC x   Sq Epi: x / Non Sq Epi: x / Bacteria: x      CAPILLARY BLOOD GLUCOSE            RADIOLOGY & ADDITIONAL TESTS:    Imaging Personally Reviewed:  YES    Consultant(s) Notes Reviewed:   YES    Care Discussed with Consultants :     Plan of care was discussed with patient and /or primary care giver; all questions and concerns were addressed and care was aligned with patient's wishes.

## 2017-08-03 ENCOUNTER — TRANSCRIPTION ENCOUNTER (OUTPATIENT)
Age: 22
End: 2017-08-03

## 2017-08-03 VITALS
RESPIRATION RATE: 15 BRPM | TEMPERATURE: 98 F | SYSTOLIC BLOOD PRESSURE: 106 MMHG | HEART RATE: 76 BPM | OXYGEN SATURATION: 100 % | DIASTOLIC BLOOD PRESSURE: 69 MMHG

## 2017-08-03 LAB
ACE SERPL-CCNC: 38 U/L — SIGNIFICANT CHANGE UP (ref 14–82)
ANA TITR SER: NEGATIVE — SIGNIFICANT CHANGE UP
ANION GAP SERPL CALC-SCNC: 6 MMOL/L — SIGNIFICANT CHANGE UP (ref 5–17)
BASOPHILS # BLD AUTO: 0 K/UL — SIGNIFICANT CHANGE UP (ref 0–0.2)
BASOPHILS NFR BLD AUTO: 0.2 % — SIGNIFICANT CHANGE UP (ref 0–2)
BUN SERPL-MCNC: 12 MG/DL — SIGNIFICANT CHANGE UP (ref 7–18)
CALCIUM SERPL-MCNC: 8 MG/DL — LOW (ref 8.4–10.5)
CHLORIDE SERPL-SCNC: 109 MMOL/L — HIGH (ref 96–108)
CO2 SERPL-SCNC: 27 MMOL/L — SIGNIFICANT CHANGE UP (ref 22–31)
CREAT SERPL-MCNC: 0.51 MG/DL — SIGNIFICANT CHANGE UP (ref 0.5–1.3)
EOSINOPHIL # BLD AUTO: 0 K/UL — SIGNIFICANT CHANGE UP (ref 0–0.5)
EOSINOPHIL NFR BLD AUTO: 0 % — SIGNIFICANT CHANGE UP (ref 0–6)
GLUCOSE SERPL-MCNC: 105 MG/DL — HIGH (ref 70–99)
HCT VFR BLD CALC: 31.5 % — LOW (ref 34.5–45)
HGB BLD-MCNC: 10.1 G/DL — LOW (ref 11.5–15.5)
LYMPHOCYTES # BLD AUTO: 1.5 K/UL — SIGNIFICANT CHANGE UP (ref 1–3.3)
LYMPHOCYTES # BLD AUTO: 14 % — SIGNIFICANT CHANGE UP (ref 13–44)
MCHC RBC-ENTMCNC: 28.4 PG — SIGNIFICANT CHANGE UP (ref 27–34)
MCHC RBC-ENTMCNC: 32 GM/DL — SIGNIFICANT CHANGE UP (ref 32–36)
MCV RBC AUTO: 88.8 FL — SIGNIFICANT CHANGE UP (ref 80–100)
MONOCYTES # BLD AUTO: 0.6 K/UL — SIGNIFICANT CHANGE UP (ref 0–0.9)
MONOCYTES NFR BLD AUTO: 6.1 % — SIGNIFICANT CHANGE UP (ref 2–14)
NEUTROPHILS # BLD AUTO: 8.4 K/UL — HIGH (ref 1.8–7.4)
NEUTROPHILS NFR BLD AUTO: 79.8 % — HIGH (ref 43–77)
PLATELET # BLD AUTO: 216 K/UL — SIGNIFICANT CHANGE UP (ref 150–400)
POTASSIUM SERPL-MCNC: 3.9 MMOL/L — SIGNIFICANT CHANGE UP (ref 3.5–5.3)
POTASSIUM SERPL-SCNC: 3.9 MMOL/L — SIGNIFICANT CHANGE UP (ref 3.5–5.3)
RBC # BLD: 3.54 M/UL — LOW (ref 3.8–5.2)
RBC # FLD: 12.4 % — SIGNIFICANT CHANGE UP (ref 10.3–14.5)
SODIUM SERPL-SCNC: 142 MMOL/L — SIGNIFICANT CHANGE UP (ref 135–145)
WBC # BLD: 10.5 K/UL — SIGNIFICANT CHANGE UP (ref 3.8–10.5)
WBC # FLD AUTO: 10.5 K/UL — SIGNIFICANT CHANGE UP (ref 3.8–10.5)

## 2017-08-03 PROCEDURE — 80061 LIPID PANEL: CPT

## 2017-08-03 PROCEDURE — 82085 ASSAY OF ALDOLASE: CPT

## 2017-08-03 PROCEDURE — 99285 EMERGENCY DEPT VISIT HI MDM: CPT | Mod: 25

## 2017-08-03 PROCEDURE — 82607 VITAMIN B-12: CPT

## 2017-08-03 PROCEDURE — 86160 COMPLEMENT ANTIGEN: CPT

## 2017-08-03 PROCEDURE — 96374 THER/PROPH/DIAG INJ IV PUSH: CPT

## 2017-08-03 PROCEDURE — 81025 URINE PREGNANCY TEST: CPT

## 2017-08-03 PROCEDURE — 82550 ASSAY OF CK (CPK): CPT

## 2017-08-03 PROCEDURE — 83735 ASSAY OF MAGNESIUM: CPT

## 2017-08-03 PROCEDURE — 73522 X-RAY EXAM HIPS BI 3-4 VIEWS: CPT

## 2017-08-03 PROCEDURE — 84443 ASSAY THYROID STIM HORMONE: CPT

## 2017-08-03 PROCEDURE — 80053 COMPREHEN METABOLIC PANEL: CPT

## 2017-08-03 PROCEDURE — 83036 HEMOGLOBIN GLYCOSYLATED A1C: CPT

## 2017-08-03 PROCEDURE — 87040 BLOOD CULTURE FOR BACTERIA: CPT

## 2017-08-03 PROCEDURE — 86038 ANTINUCLEAR ANTIBODIES: CPT

## 2017-08-03 PROCEDURE — 73130 X-RAY EXAM OF HAND: CPT

## 2017-08-03 PROCEDURE — 86480 TB TEST CELL IMMUN MEASURE: CPT

## 2017-08-03 PROCEDURE — 81003 URINALYSIS AUTO W/O SCOPE: CPT

## 2017-08-03 PROCEDURE — 82746 ASSAY OF FOLIC ACID SERUM: CPT

## 2017-08-03 PROCEDURE — 82164 ANGIOTENSIN I ENZYME TEST: CPT

## 2017-08-03 PROCEDURE — 71046 X-RAY EXAM CHEST 2 VIEWS: CPT

## 2017-08-03 PROCEDURE — 73030 X-RAY EXAM OF SHOULDER: CPT

## 2017-08-03 PROCEDURE — 80074 ACUTE HEPATITIS PANEL: CPT

## 2017-08-03 PROCEDURE — 85652 RBC SED RATE AUTOMATED: CPT

## 2017-08-03 PROCEDURE — 86618 LYME DISEASE ANTIBODY: CPT

## 2017-08-03 PROCEDURE — 86235 NUCLEAR ANTIGEN ANTIBODY: CPT

## 2017-08-03 PROCEDURE — 84100 ASSAY OF PHOSPHORUS: CPT

## 2017-08-03 PROCEDURE — 86225 DNA ANTIBODY NATIVE: CPT

## 2017-08-03 PROCEDURE — 86790 VIRUS ANTIBODY NOS: CPT

## 2017-08-03 PROCEDURE — 84550 ASSAY OF BLOOD/URIC ACID: CPT

## 2017-08-03 PROCEDURE — 96375 TX/PRO/DX INJ NEW DRUG ADDON: CPT

## 2017-08-03 PROCEDURE — 80048 BASIC METABOLIC PNL TOTAL CA: CPT

## 2017-08-03 PROCEDURE — 86431 RHEUMATOID FACTOR QUANT: CPT

## 2017-08-03 PROCEDURE — 85027 COMPLETE CBC AUTOMATED: CPT

## 2017-08-03 PROCEDURE — 86200 CCP ANTIBODY: CPT

## 2017-08-03 RX ORDER — POLYETHYLENE GLYCOL 3350 17 G/17G
17 POWDER, FOR SOLUTION ORAL
Qty: 0 | Refills: 0 | DISCHARGE
Start: 2017-08-03

## 2017-08-03 RX ORDER — IBUPROFEN 200 MG
1 TABLET ORAL
Qty: 0 | Refills: 0 | COMMUNITY
Start: 2017-08-03

## 2017-08-03 RX ADMIN — Medication 40 MILLIGRAM(S): at 06:05

## 2017-08-03 RX ADMIN — POLYETHYLENE GLYCOL 3350 17 GRAM(S): 17 POWDER, FOR SOLUTION ORAL at 12:11

## 2017-08-03 RX ADMIN — Medication 400 MILLIGRAM(S): at 06:05

## 2017-08-03 NOTE — PROGRESS NOTE ADULT - SUBJECTIVE AND OBJECTIVE BOX
Patient is a 21y old  Female who presents with a chief complaint of multiple joint pain, subjective fever (01 Aug 2017 06:06)    pt seen in icu [  ], reg med floor [ x  ], bed [x ], chair at bedside [   ], a+o x3 [ x ], lethargic [  ],  nad [ x ]      Allergies    No Known Allergies        Vitals    T(F): 98.2 (08-03-17 @ 05:00), Max: 98.3 (08-02-17 @ 14:32)  HR: 65 (08-03-17 @ 05:00) (65 - 75)  BP: 98/56 (08-03-17 @ 05:00) (98/56 - 107/69)  RR: 16 (08-03-17 @ 05:00) (14 - 16)  SpO2: 99% (08-03-17 @ 05:00) (98% - 100%)  Wt(kg): --  CAPILLARY BLOOD GLUCOSE          Labs                          10.1   10.5  )-----------( 216      ( 03 Aug 2017 05:59 )             31.5       08-03    142  |  109<H>  |  12  ----------------------------<  105<H>  3.9   |  27  |  0.51    Ca    8.0<L>      03 Aug 2017 05:59                  Radiology Results      Meds    MEDICATIONS  (STANDING):  sodium chloride 0.9%. 1000 milliLiter(s) (75 mL/Hr) IV Continuous <Continuous>  ibuprofen  Tablet 400 milliGRAM(s) Oral two times a day  polyethylene glycol 3350 17 Gram(s) Oral daily  predniSONE   Tablet 20 milliGRAM(s) Oral two times a day      MEDICATIONS  (PRN):      Physical Exam    Neuro :  no focal deficits  Respiratory: CTA B/L  CV: RRR, S1S2, no murmurs,   Abdominal: Soft, NT, ND +BS,  Extremities: No edema, + peripheral pulses    ASSESSMENT    polyarthritis and arthralgia 2nd to lupus     PLAN    rheumatoid, anti ssa, anti ssb anti-smith, anti ds dna, anti rnp result positive noted   complement neg  chlamydia- gonocco neg   hepatitis panel neg   rheum cons noted  change solumedrol to prednisone  cont pred 40 mg daily x 7 days then taper by 10 mg q 7days  pt stable for d/c home with rheum f/u outpt in 7 days

## 2017-08-03 NOTE — DISCHARGE NOTE ADULT - CARE PROVIDER_API CALL
Kenneth Sykes), Internal Medicine; Rheumatology  [address] [place] [address]  Phone: [phone]  Fax: [phone]    Logan Storey), Internal Medicine  [address] [place] [address]  Phone: [phone]  Fax: [phone]

## 2017-08-03 NOTE — DISCHARGE NOTE ADULT - MEDICATION SUMMARY - MEDICATIONS TO TAKE
I will START or STAY ON the medications listed below when I get home from the hospital:    predniSONE 10 mg oral tablet  -- Take 40 mg., X 7 days; then 30 mg. X 7 days; then 20 mg X 7 days; then 10 mg. X 7 days; then STOP.  -- It is very important that you take or use this exactly as directed.  Do not skip doses or discontinue unless directed by your doctor.  Obtain medical advice before taking any non-prescription drugs as some may affect the action of this medication.  Take with food or milk.    -- Indication: For lupus/prednisone taper    ibuprofen 400 mg oral tablet  -- 1 tab(s) by mouth 2 times a day  -- Indication: For lupus    polyethylene glycol 3350 oral powder for reconstitution  -- 17 gram(s) by mouth once a day  -- Indication: For constipation

## 2017-08-03 NOTE — DISCHARGE NOTE ADULT - PATIENT PORTAL LINK FT
“You can access the [employer] Patient Portal, offered by [facility], by registering with the following website: [email or web address]”

## 2017-08-03 NOTE — DISCHARGE NOTE ADULT - HOSPITAL COURSE
20 Y/O F, lives with grandparents, no PMHx , presented with multiple joint pain, x 2 weeks. According to patient she has muscle spasm about 6 months ago, given the muscle relaxant that results in mild improvement. About 2 weeks ago she noticed right and left shoulder, right hip joint pain, associated with difficulty in performing daily activities  walking, dressing, unable to lie down.  6 month ago she had bilateral bursitis of elbow, treated with conservative measures. About two weeks ago she went to urgent care to similar symptoms , also she had headache at that time. CT scan done was negative. She completed treatment for a UTI one week ago with nitrofurantoin, BID X 7 days.    She is in monogamous relationship with partner for 3 yrs, uses protection infrequently. She denied fever, runny nose, neck stiffness, chest pain, cough, abdominal pain, travel, hiking, rash , tick bite, skin lesions, vaginal discharge vaginal lesions, similar complaints in partner, family history of joint disease or autoimmune conditions, joint pain migrating from one joint to other, photosensitivity , chest pain.     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  She was sent to rheumatologist one day ago who sent her to the ED.      In the ED patient vitals showed T max of 101.5, , RR20.  UA negative and received Zithromax, Rocephin, Toradol X 1.. ESR 82.    Poly arthritis and arthralgia, 2/2 Lupus   Morning stiffness, muscle pain, interfering with daily activities   Physical exam pertinent for tenderness of bilateral wrist, no erythema, warmth left hip and bilateral shoulder.  Multiple labs ordered/pending including CTP, KENAN, DS DNA, Compliment C3/C4  KAZ, SSA/SSB, Ace level   X-ray bilateral shoulder showed:  " No evidence for an acute fracture, or dislocation.The joint spaces are preserved.  The osseous mineralization is within normal limits.  No abnormal soft tissue calcification."  X-rays bilateral hip showed:  "No evidence for an acute fracture, or dislocation.  The hip joint spaces are preserved bilaterally.  The SI joints appear unremarkable. The osseous mineralization is within normal limits."  CXR showed:  "No radiographic evidence for acute cardiopulmonary disease."  X-ray bilateral hand unremarkable  Dr. Storey, attending evaluated  Pt started on NSAIDS and steroids with improvement.  rheumatoid, anti ssa, anti ssb anti-smith, anti ds dna, anti rnp result positive noted   complement neg  chlamydia- gonocco neg   hepatitis panel neg   Dr. Ruiz, rheumatologist, evaluated and noted above results consistent consistent with lupus.  He stated that the patient was much improved on steroids recommended change IV Steroids to P.O 20 bid x 1 week with taper and to consider Plaquenil as outpatient.  Pt. moving to [place] advised to f/u with rheumatologist as outpt in one week.    UTI (urinary tract infection).    UA negative/UC negative  (outpatient treatment with nitrofurantoin x 7 days as prescribed by the PCP completed).    Need for prophylactic measure.    VTE score is 0, with 3 month risk of VTE is 0.4, patient ambulating, no need for DVT prophylaxis.     Dr. Storey, attending, cleared patient for discharge with Prednisone 40 mg daily x 7 days then taper by 10 mg q 7days.  Pt  stable for d/c home with rheumatologist f/u outpt in 7 days

## 2017-08-04 LAB — HEV AB FLD QL: NEGATIVE — SIGNIFICANT CHANGE UP

## 2017-08-06 LAB
CULTURE RESULTS: SIGNIFICANT CHANGE UP
CULTURE RESULTS: SIGNIFICANT CHANGE UP
SPECIMEN SOURCE: SIGNIFICANT CHANGE UP
SPECIMEN SOURCE: SIGNIFICANT CHANGE UP

## 2017-08-07 LAB — HEV IGM SER QL: SIGNIFICANT CHANGE UP

## 2017-08-09 DIAGNOSIS — N39.0 URINARY TRACT INFECTION, SITE NOT SPECIFIED: ICD-10-CM

## 2017-08-09 DIAGNOSIS — M32.9 SYSTEMIC LUPUS ERYTHEMATOSUS, UNSPECIFIED: ICD-10-CM

## 2018-02-13 ENCOUNTER — INPATIENT (INPATIENT)
Facility: HOSPITAL | Age: 23
LOS: 3 days | Discharge: HOME | DRG: 545 | End: 2018-02-17
Attending: STUDENT IN AN ORGANIZED HEALTH CARE EDUCATION/TRAINING PROGRAM | Admitting: STUDENT IN AN ORGANIZED HEALTH CARE EDUCATION/TRAINING PROGRAM
Payer: COMMERCIAL

## 2018-02-13 VITALS
SYSTOLIC BLOOD PRESSURE: 102 MMHG | OXYGEN SATURATION: 100 % | HEART RATE: 90 BPM | RESPIRATION RATE: 18 BRPM | TEMPERATURE: 98 F | DIASTOLIC BLOOD PRESSURE: 72 MMHG

## 2018-02-13 DIAGNOSIS — D61.818 OTHER PANCYTOPENIA: ICD-10-CM

## 2018-02-13 LAB
ALBUMIN SERPL ELPH-MCNC: 2.5 G/DL — LOW (ref 3.5–5)
ALP SERPL-CCNC: 57 U/L — SIGNIFICANT CHANGE UP (ref 40–120)
ALT FLD-CCNC: 15 U/L DA — SIGNIFICANT CHANGE UP (ref 10–60)
ANION GAP SERPL CALC-SCNC: 5 MMOL/L — SIGNIFICANT CHANGE UP (ref 5–17)
APPEARANCE UR: CLEAR — SIGNIFICANT CHANGE UP
AST SERPL-CCNC: 44 U/L — HIGH (ref 10–40)
BILIRUB SERPL-MCNC: 0.5 MG/DL — SIGNIFICANT CHANGE UP (ref 0.2–1.2)
BILIRUB UR-MCNC: ABNORMAL
BUN SERPL-MCNC: 14 MG/DL — SIGNIFICANT CHANGE UP (ref 7–18)
CALCIUM SERPL-MCNC: 7.5 MG/DL — LOW (ref 8.4–10.5)
CHLORIDE SERPL-SCNC: 101 MMOL/L — SIGNIFICANT CHANGE UP (ref 96–108)
CO2 SERPL-SCNC: 29 MMOL/L — SIGNIFICANT CHANGE UP (ref 22–31)
COLOR SPEC: YELLOW — SIGNIFICANT CHANGE UP
CREAT SERPL-MCNC: 0.81 MG/DL — SIGNIFICANT CHANGE UP (ref 0.5–1.3)
CRP SERPL-MCNC: 6.9 MG/DL — HIGH (ref 0–0.4)
DIFF PNL FLD: NEGATIVE — SIGNIFICANT CHANGE UP
ERYTHROCYTE [SEDIMENTATION RATE] IN BLOOD: 103 MM/HR — HIGH (ref 0–15)
GLUCOSE SERPL-MCNC: 78 MG/DL — SIGNIFICANT CHANGE UP (ref 70–99)
GLUCOSE UR QL: NEGATIVE — SIGNIFICANT CHANGE UP
HCT VFR BLD CALC: 31.6 % — LOW (ref 34.5–45)
HGB BLD-MCNC: 9.4 G/DL — LOW (ref 11.5–15.5)
KETONES UR-MCNC: ABNORMAL
LEUKOCYTE ESTERASE UR-ACNC: ABNORMAL
LYMPHOCYTES # BLD AUTO: 26 % — SIGNIFICANT CHANGE UP (ref 13–44)
MCHC RBC-ENTMCNC: 27.5 PG — SIGNIFICANT CHANGE UP (ref 27–34)
MCHC RBC-ENTMCNC: 29.7 GM/DL — LOW (ref 32–36)
MCV RBC AUTO: 92.4 FL — SIGNIFICANT CHANGE UP (ref 80–100)
MONOCYTES NFR BLD AUTO: 6 % — SIGNIFICANT CHANGE UP (ref 2–14)
NEUTROPHILS NFR BLD AUTO: 68 % — SIGNIFICANT CHANGE UP (ref 43–77)
NITRITE UR-MCNC: NEGATIVE — SIGNIFICANT CHANGE UP
PH UR: 5 — SIGNIFICANT CHANGE UP (ref 5–8)
PLATELET # BLD AUTO: 85 K/UL — LOW (ref 150–400)
POTASSIUM SERPL-MCNC: 3.8 MMOL/L — SIGNIFICANT CHANGE UP (ref 3.5–5.3)
POTASSIUM SERPL-SCNC: 3.8 MMOL/L — SIGNIFICANT CHANGE UP (ref 3.5–5.3)
PROT SERPL-MCNC: 9.2 G/DL — HIGH (ref 6–8.3)
PROT UR-MCNC: 30 MG/DL
RBC # BLD: 3.42 M/UL — LOW (ref 3.8–5.2)
RBC # FLD: 13.2 % — SIGNIFICANT CHANGE UP (ref 10.3–14.5)
SODIUM SERPL-SCNC: 135 MMOL/L — SIGNIFICANT CHANGE UP (ref 135–145)
SP GR SPEC: 1.01 — SIGNIFICANT CHANGE UP (ref 1.01–1.02)
UROBILINOGEN FLD QL: 1
WBC # BLD: 2.9 K/UL — LOW (ref 3.8–10.5)
WBC # FLD AUTO: 2.9 K/UL — LOW (ref 3.8–10.5)

## 2018-02-13 PROCEDURE — 71046 X-RAY EXAM CHEST 2 VIEWS: CPT | Mod: 26

## 2018-02-13 PROCEDURE — 99285 EMERGENCY DEPT VISIT HI MDM: CPT

## 2018-02-13 NOTE — H&P ADULT - PMH
No pertinent past medical history    UTI (urinary tract infection) SLE (systemic lupus erythematosus)    UTI (urinary tract infection)

## 2018-02-13 NOTE — H&P ADULT - NSHPLABSRESULTS_GEN_ALL_CORE
CBC Full  -  ( 2018 17:31 )  WBC Count : 2.9 K/uL  Hemoglobin : 9.4 g/dL  Hematocrit : 31.6 %  Platelet Count - Automated : 85 K/uL  Mean Cell Volume : 92.4 fl  Mean Cell Hemoglobin : 27.5 pg  Mean Cell Hemoglobin Concentration : 29.7 gm/dL  Auto Neutrophil # : x  Auto Lymphocyte # : x  Auto Monocyte # : x  Auto Eosinophil # : x  Auto Basophil # : x  Auto Neutrophil % : 68.0 %  Auto Lymphocyte % : 26.0 %  Auto Monocyte % : 6.0 %  Auto Eosinophil % : x  Auto Basophil % : x        135  |  101  |  14  ----------------------------<  78  3.8   |  29  |  0.81    Ca    7.5<L>      2018 17:31    TPro  9.2<H>  /  Alb  2.5<L>  /  TBili  0.5  /  DBili  x   /  AST  44<H>  /  ALT  15  /  AlkPhos  57  -    < from: Xray Chest 2 Views PA/Lat (18 @ 16:22) >      Lungs: The lungs are clear.  Heart: The heart is normal in size.  Mediastinum: The mediastinum is within normal limits.    IMPRESSION:    Clear lungs.    < end of copied text >    Urinalysis Basic - ( 2018 17:31 )    Color: Yellow / Appearance: Clear / S.010 / pH: x  Gluc: x / Ketone: Small  / Bili: Small / Urobili: 1   Blood: x / Protein: 30 mg/dL / Nitrite: Negative   Leuk Esterase: Small / RBC: 5-10 /HPF / WBC 11-25 /HPF   Sq Epi: x / Non Sq Epi: Moderate /HPF / Bacteria: Many /HPF

## 2018-02-13 NOTE — H&P ADULT - HISTORY OF PRESENT ILLNESS
22yr old F from home, ambulates independtly with PMH of Systemic lupus erythematosus (diagnosed in Aug 2017, was started on steroids and hydroxychloroquine) came with c/o fever for 1 week. She states that since 1 weeks she is having constant fluctuating fevers of about 102-103F that was sometimes relieved with Ibuprofen. Fever is associated with severe chills and shivering, Nausea, vomited 2wice with food particles, non-productive cough, mild dyspnea on walking fatigue, anorexia and sever constipation ( 22yr old F from home, ambulates independtly with PMH of Systemic lupus erythematosus (diagnosed in Aug 2017, was started on steroids and hydroxychloroquine) came with c/o fever for 1 week. She states that since 1 weeks she is having constant fluctuating fevers of about 102-103F that was sometimes relieved with Ibuprofen. Fever is associated with severe chills and shivering, Nausea, vomited 2wice with food particles, non-productive cough, mild dyspnea on walking fatigue, anorexia and constipation ( last BM last week ago). She has chronic arthralgias, generalized  body ache, morning stiffness in legs and arms due to lupus. She is on prednisone but she never took hydroxychloroquine. She went to [place] on Sunday. She denies Night sweats, palpitations, diarrhea,  headache, abdominal pain or any sick contacts. She also told she went to her Obgyn 3 weeks and was treated for vaginal candidiasis.    In ED: patient's vitals sign were stable, Labs remarkable for ESR of 103, pancytopenia WBC of 2.9, RBC: 3.42, Platelets 85, Hemoglobin of 9.4, Urinalysis is positive for UTI. 22yr old F from home, ambulates independtly with PMH of Systemic lupus erythematosus (diagnosed in Aug 2017, was started on steroids and hydroxychloroquine) came with c/o fever for 1 week. She states that since 1 weeks she is having constant fluctuating fevers of about 102-103F that was sometimes relieved with Ibuprofen. Fever is associated with severe chills and shivering, Nausea, vomited 2wice with food particles, non-productive cough, mild dyspnea on walking fatigue, anorexia and constipation ( last BM last week ago). She has chronic arthralgias, generalized  body ache, morning stiffness in legs and arms due to lupus. She is on prednisone but she never took hydroxychloroquine. She went to [place] on Sunday. She denies Night sweats, palpitations, diarrhea,  headache, abdominal pain or any sick contacts. She also told she went to her Obgyn 3 weeks and was treated for vaginal candidiasis.    In ED: patient's vitals sign were stable, Labs remarkable for ESR of 103, pancytopenia WBC of 2.9, RBC: 3.42, Platelets 85, Hemoglobin of 9.4, Urinalysis is positive for UTI. When patienr 22yr old F from home, ambulates independtly with PMH of Systemic lupus erythematosus (diagnosed in Aug 2017, was started on steroids and hydroxychloroquine) came with c/o fever for 1 week. She states that since 1 weeks she is having constant fluctuating fevers of about 102-103F that was sometimes relieved with Ibuprofen. Fever is associated with severe chills and shivering, Nausea, vomited 2wice with food particles, non-productive cough, mild dyspnea on walking fatigue, anorexia and constipation ( last BM last week ago). She has chronic arthralgias, generalized  body ache, morning stiffness in legs and arms due to lupus. She is on prednisone but she never took hydroxychloroquine. She went to [place] on Sunday. She denies Night sweats, palpitations, diarrhea,  headache, abdominal pain or any sick contacts. She also told she went to her Obgyn 3 weeks and was treated for vaginal candidiasis.    In ED: patient's vitals sign were stable, Labs remarkable for ESR of 103, pancytopenia WBC of 2.9, RBC: 3.42, Platelets 85, Hemoglobin of 9.4, Urinalysis is positive for UTI. When patient was seen by me she was in mild distress but lying comfortably 22yr old F from home, ambulates independtly with PMH of Systemic lupus erythematosus (diagnosed in Aug 2017, was started on steroids and hydroxychloroquine) came with c/o fever for 1 week. She states that since 1 weeks she is having constant fluctuating fevers of about 102-103F that was sometimes relieved with Ibuprofen. Fever is associated with severe chills, rigors and shivering, Nausea, vomited 2wice with food particles, non-productive cough, mild dyspnea on walking fatigue, anorexia and constipation ( last BM last week ago). She has chronic arthralgias, generalized  body ache, morning stiffness in legs and arms due to lupus. She is on prednisone but she never took hydroxychloroquine. She went to [place] on Sunday. She denies Night sweats, palpitations, diarrhea,  headache, abdominal pain or any sick contacts. She also told she went to her Obgyn 3 weeks and was treated for vaginal candidiasis.    In ED: patient's vitals sign were stable, Labs remarkable for ESR of 103, pancytopenia WBC of 2.9, RBC: 3.42, Platelets 85, Hemoglobin of 9.4, Urinalysis is positive for UTI. When patient was seen by me she was in mild distress but lying comfortably

## 2018-02-13 NOTE — H&P ADULT - ASSESSMENT
22yr old F from home, ambulates independtly with PMH of Systemic lupus erythematosus (diagnosed in Aug 2017, was started on steroids and hydroxychloroquine) came with c/o fever for 1 week. She states that since 1 weeks she is having constant fluctuating fevers of about 102-103F that was sometimes relieved with Ibuprofen. Fever is associated with severe chills and shivering, Nausea, vomited 2wice with food particles, non-productive cough, mild dyspnea on walking fatigue, anorexia and constipation ( last BM last week ago). She has chronic arthralgias, generalized  body ache, morning stiffness in legs and arms due to lupus. She is on prednisone but she never took hydroxychloroquine. She went to [place] on Sunday. She denies Night sweats, palpitations, diarrhea,  headache, abdominal pain or any sick contacts. She also told she went to her Obgyn 3 weeks and was treated for vaginal candidiasis.    In ED: patient's vitals sign were stable, Labs remarkable for ESR of 103, pancytopenia WBC of 2.9, RBC: 3.42, Platelets 85, Hemoglobin of 9.4, Urinalysis is positive for UTI. When patient was seen by me she was in mild distress but lying comfortably    Patient is admitted for concerns of lupus flare or septicemia with UTI 22yr old F from home, ambulates independtly with PMH of Systemic lupus erythematosus (diagnosed in Aug 2017, was started on steroids and hydroxychloroquine) came with c/o fever for 1 week. She states that since 1 weeks she is having constant fluctuating fevers of about 102-103F that was sometimes relieved with Ibuprofen. Fever is associated with severe chills, rigors and shivering, Nausea, vomited 2wice with food particles, non-productive cough, mild dyspnea on walking fatigue, anorexia and constipation ( last BM last week ago). She has chronic arthralgias, generalized  body ache, morning stiffness in legs and arms due to lupus. She is on prednisone but she never took hydroxychloroquine. She went to [place] on Sunday. She denies Night sweats, palpitations, diarrhea,  headache, abdominal pain or any sick contacts. She also told she went to her Obgyn 3 weeks and was treated for vaginal candidiasis.    In ED: patient's vitals sign were stable, Labs remarkable for ESR of 103, pancytopenia WBC of 2.9, RBC: 3.42, Platelets 85, Hemoglobin of 9.4, Urinalysis is positive for UTI. When patient was seen by me she was in mild distress but lying comfortably    Patient is admitted for concerns of lupus flare or septicemia with UTI

## 2018-02-13 NOTE — H&P ADULT - PROBLEM SELECTOR PLAN 4
RISK                                                          Points  [  ] Previous VTE                                                3  [  ] Thrombophilia                                             2  [  ] Lower limb paralysis                                   2        (unable to hold up >15 seconds)    [  ] Current Cancer                                             2         (within 6 months)  [ x ] Immobilization > 24 hrs                              1  [  ] ICU/CCU stay > 24 hours                             1  [  ] Age > 60                                                         1    IMPROVE VTE Score: 1  No need for chemical prophylaxis

## 2018-02-13 NOTE — ED PROVIDER NOTE - OBJECTIVE STATEMENT
fever for two weeks  no n/v/d   no cough, no night sweats no wt loss    told "lupus" about one month ago  was on prednisone one month ago

## 2018-02-13 NOTE — H&P ADULT - PROBLEM SELECTOR PLAN 1
Patient presented with fever for 1 week, dry cough Patient presented with fever for 1 week with pancytopenia, most likely lupus flare, could also be due to bacteremia o Patient presented with fever for 1 week with pancytopenia, arthralgia, joint stiffness, increased ESR, with physical examination findings of joint tenderness. most likely lupus flare,   - Patient was given steroids and hydroxychloroquine but she is non compliant to hydroxchloroquine.  -will also r/o bacteremia/ HIV/EBV/CMV as patient is on long term steroids so f/u Blood cultures, HIV, EBV,CMV serology  -Started prednisone, Naproxen for morning stiffness  -Monitor CBC for thrombocytopenia, H/H drop, Patient presented with fever for 1 week with pancytopenia, arthralgia, joint stiffness, increased ESR, with physical examination findings of joint tenderness. most likely lupus flare,   - Patient was given steroids and hydroxychloroquine but she is non compliant to hydroxchloroquine.  -will also r/o bacteremia/ HIV/EBV/CMV as patient is on long term steroids so f/u Blood cultures, HIV, EBV,CMV serology  -Started prednisone, Naproxen for morning stiffness  Started broad spectrum antibiotic cefepime for concern of sepsis  -c/w tyelnol for fever  -Monitor CBC for thrombocytopenia, H/H drop,  ID: Patient presented with fever for 1 week with pancytopenia, arthralgia, joint stiffness, increased ESR, with physical examination findings of joint tenderness. most likely lupus flare,   - Patient was given steroids and hydroxychloroquine but she is non compliant to hydroxchloroquine.  -will also r/o bacteremia/ HIV/EBV/CMV as patient is on long term steroids so f/u Blood cultures, HIV, EBV,CMV serology  -Started prednisone, Naproxen for morning stiffness  Started broad spectrum antibiotic cefepime for concern of sepsis  -c/w tyelnol for fever  -Monitor CBC for thrombocytopenia, H/H drop,  ID: Dr mendoza

## 2018-02-13 NOTE — H&P ADULT - ATTENDING COMMENTS
Agree with above, except:  Patient was seen and examined at bedside. She is a 21 yo female with PMH of SLE diagnosed last year, but not on treatment, presented with persistent fever for the past week. Denies any other symptoms. She reports taking prednisone 10 mg occasionally when she does not feel good. Usually for 2-3 days.     ROS as above   PE:   General; Thin young lady, anxious and distressed, cries easily.   Neck: Supple, no JVD   Cardio: regular rate and rhythm, no murmur   Pulm: Clean breath sounds, no wheezing   GI/: abdomen is soft, non tender   Extr: no rash, no edema   Neuro: AO x3, no focal weakness     Vital Signs Last 24 Hrs  T(C): 37.3 (14 Feb 2018 12:49), Max: 39.2 (14 Feb 2018 05:56)  T(F): 99.2 (14 Feb 2018 12:49), Max: 102.5 (14 Feb 2018 05:56)  HR: 109 (14 Feb 2018 12:49) (88 - 115)  BP: 109/68 (14 Feb 2018 12:49) (95/58 - 109/68)  BP(mean): --  RR: 18 (14 Feb 2018 12:49) (18 - 18)  SpO2: 99% (14 Feb 2018 12:49) (99% - 100%)    1. Fever and pancytopenia likely secondary to SLE flair vs recent viral infection ???  UC, UC, BC  all sent and pending   Elevated ESR   Will send rheumatological workup   Considering pancytopenia started broad spectrum antibiotics: c/w Cefepime 1000 mg IV q12 for now   d/c Prednisone for now   Ibuprofen for fever and pain   ID and rheumatology consults requested     Plan of care discussed with patient and her mother in detail

## 2018-02-13 NOTE — H&P ADULT - PROBLEM SELECTOR PLAN 2
Patient's UA positive for UTI, patient did not have any Urinary symptoms  f/u Urine culture Patient's UA positive for UTI, patient did not have any Urinary symptoms  f/u Urine culture  Patient on maxipime

## 2018-02-13 NOTE — H&P ADULT - NSHPPHYSICALEXAM_GEN_ALL_CORE
Vital Signs Last 24 Hrs  T(C): 37.3 (14 Feb 2018 00:01), Max: 37.3 (14 Feb 2018 00:01)  T(F): 99.1 (14 Feb 2018 00:01), Max: 99.1 (14 Feb 2018 00:01)  HR: 100 (14 Feb 2018 00:01) (88 - 100)  BP: 105/64 (14 Feb 2018 00:01) (101/70 - 105/64)  BP(mean): --  RR: 18 (14 Feb 2018 00:01) (18 - 18)  SpO2: 100% (14 Feb 2018 00:01) (100% - 100%)    PHYSICAL EXAM:  GENERAL: In mild distress  HEAD:  Atraumatic, Normocephalic  EYES: conjunctiva and sclera clear  NECK: Supple, No JVD, Normal thyroid  CHEST/LUNG: Clear to auscultation, Clear to percussion bilaterally; No rales, rhonchi, wheezing, or rubs  HEART: Regular rate and rhythm; No murmurs, rubs, or gallops  ABDOMEN: Soft, Nontender, Nondistended; Bowel sounds present  NERVOUS SYSTEM:  Alert & Oriented X3, Good concentration; Motor Strength 5/5 B/L   EXTREMITIES:  2+ Peripheral Pulses, No clubbing, cyanosis, or edema, B/l arms and wrist tenderness R>L decreased ROM of wrist. Tenderness in metacarpophalangeal and metatarsal joints  SKIN: dry warm  SKIN;

## 2018-02-13 NOTE — ED PROVIDER NOTE - CHPI ED SYMPTOMS NEG
no vomiting/no abdominal pain/no decreased eating/drinking/no shortness of breath/no headache/no cough/no diarrhea/no rash

## 2018-02-14 DIAGNOSIS — K59.00 CONSTIPATION, UNSPECIFIED: ICD-10-CM

## 2018-02-14 DIAGNOSIS — Z29.9 ENCOUNTER FOR PROPHYLACTIC MEASURES, UNSPECIFIED: ICD-10-CM

## 2018-02-14 DIAGNOSIS — N39.0 URINARY TRACT INFECTION, SITE NOT SPECIFIED: ICD-10-CM

## 2018-02-14 DIAGNOSIS — M32.9 SYSTEMIC LUPUS ERYTHEMATOSUS, UNSPECIFIED: ICD-10-CM

## 2018-02-14 LAB
ANION GAP SERPL CALC-SCNC: 7 MMOL/L — SIGNIFICANT CHANGE UP (ref 5–17)
BASOPHILS # BLD AUTO: 0 K/UL — SIGNIFICANT CHANGE UP (ref 0–0.2)
BASOPHILS NFR BLD AUTO: 0.6 % — SIGNIFICANT CHANGE UP (ref 0–2)
BUN SERPL-MCNC: 8 MG/DL — SIGNIFICANT CHANGE UP (ref 7–18)
CALCIUM SERPL-MCNC: 7.6 MG/DL — LOW (ref 8.4–10.5)
CHLORIDE SERPL-SCNC: 103 MMOL/L — SIGNIFICANT CHANGE UP (ref 96–108)
CHOLEST SERPL-MCNC: 101 MG/DL — SIGNIFICANT CHANGE UP (ref 10–199)
CMV DNA CSF QL NAA+PROBE: SIGNIFICANT CHANGE UP
CMV IGG FLD QL: >10 U/ML — HIGH
CMV IGG SERPL-IMP: POSITIVE
CMV IGM FLD-ACNC: 27.1 AU/ML — SIGNIFICANT CHANGE UP
CMV IGM SERPL QL: NEGATIVE — SIGNIFICANT CHANGE UP
CO2 SERPL-SCNC: 26 MMOL/L — SIGNIFICANT CHANGE UP (ref 22–31)
CREAT SERPL-MCNC: 0.61 MG/DL — SIGNIFICANT CHANGE UP (ref 0.5–1.3)
CULTURE RESULTS: SIGNIFICANT CHANGE UP
EBV EA AB SER IA-ACNC: 41 U/ML — HIGH
EBV EA AB TITR SER IF: POSITIVE
EBV EA IGG SER-ACNC: POSITIVE
EBV NA IGG SER IA-ACNC: >600 U/ML — HIGH
EBV PATRN SPEC IB-IMP: SIGNIFICANT CHANGE UP
EBV VCA IGG AVIDITY SER QL IA: POSITIVE
EBV VCA IGM SER IA-ACNC: 29 U/ML — SIGNIFICANT CHANGE UP
EBV VCA IGM SER IA-ACNC: >750 U/ML — HIGH
EBV VCA IGM TITR FLD: NEGATIVE — SIGNIFICANT CHANGE UP
EOSINOPHIL # BLD AUTO: 0 K/UL — SIGNIFICANT CHANGE UP (ref 0–0.5)
EOSINOPHIL NFR BLD AUTO: 0.5 % — SIGNIFICANT CHANGE UP (ref 0–6)
GLUCOSE SERPL-MCNC: 82 MG/DL — SIGNIFICANT CHANGE UP (ref 70–99)
HAV IGM SER-ACNC: SIGNIFICANT CHANGE UP
HBV CORE IGM SER-ACNC: SIGNIFICANT CHANGE UP
HBV SURFACE AG SER-ACNC: SIGNIFICANT CHANGE UP
HCT VFR BLD CALC: 26.8 % — LOW (ref 34.5–45)
HCV AB S/CO SERPL IA: 0.23 S/CO — SIGNIFICANT CHANGE UP
HCV AB SERPL-IMP: SIGNIFICANT CHANGE UP
HDLC SERPL-MCNC: 15 MG/DL — LOW (ref 40–125)
HGB BLD-MCNC: 8.5 G/DL — LOW (ref 11.5–15.5)
HIV 1+2 AB+HIV1 P24 AG SERPL QL IA: SIGNIFICANT CHANGE UP
LIPID PNL WITH DIRECT LDL SERPL: 60 MG/DL — SIGNIFICANT CHANGE UP
LYMPHOCYTES # BLD AUTO: 0.4 K/UL — LOW (ref 1–3.3)
LYMPHOCYTES # BLD AUTO: 18.8 % — SIGNIFICANT CHANGE UP (ref 13–44)
MAGNESIUM SERPL-MCNC: 2.3 MG/DL — SIGNIFICANT CHANGE UP (ref 1.6–2.6)
MCHC RBC-ENTMCNC: 29.3 PG — SIGNIFICANT CHANGE UP (ref 27–34)
MCHC RBC-ENTMCNC: 31.7 GM/DL — LOW (ref 32–36)
MCV RBC AUTO: 92.3 FL — SIGNIFICANT CHANGE UP (ref 80–100)
MONOCYTES # BLD AUTO: 0.1 K/UL — SIGNIFICANT CHANGE UP (ref 0–0.9)
MONOCYTES NFR BLD AUTO: 4.2 % — SIGNIFICANT CHANGE UP (ref 2–14)
NEUTROPHILS # BLD AUTO: 1.6 K/UL — LOW (ref 1.8–7.4)
NEUTROPHILS NFR BLD AUTO: 75.9 % — SIGNIFICANT CHANGE UP (ref 43–77)
PHOSPHATE SERPL-MCNC: 2.7 MG/DL — SIGNIFICANT CHANGE UP (ref 2.5–4.5)
PLATELET # BLD AUTO: 84 K/UL — LOW (ref 150–400)
POTASSIUM SERPL-MCNC: 3.9 MMOL/L — SIGNIFICANT CHANGE UP (ref 3.5–5.3)
POTASSIUM SERPL-SCNC: 3.9 MMOL/L — SIGNIFICANT CHANGE UP (ref 3.5–5.3)
RAPID RVP RESULT: SIGNIFICANT CHANGE UP
RBC # BLD: 2.91 M/UL — LOW (ref 3.8–5.2)
RBC # FLD: 13.4 % — SIGNIFICANT CHANGE UP (ref 10.3–14.5)
SODIUM SERPL-SCNC: 136 MMOL/L — SIGNIFICANT CHANGE UP (ref 135–145)
SPECIMEN SOURCE: SIGNIFICANT CHANGE UP
TOTAL CHOLESTEROL/HDL RATIO MEASUREMENT: 6.7 RATIO — SIGNIFICANT CHANGE UP (ref 3.3–7.1)
TRIGL SERPL-MCNC: 129 MG/DL — SIGNIFICANT CHANGE UP (ref 10–149)
TSH SERPL-MCNC: 2.72 UU/ML — SIGNIFICANT CHANGE UP (ref 0.34–4.82)
VIT B12 SERPL-MCNC: 503 PG/ML — SIGNIFICANT CHANGE UP (ref 232–1245)
WBC # BLD: 2.1 K/UL — LOW (ref 3.8–10.5)
WBC # FLD AUTO: 2.1 K/UL — LOW (ref 3.8–10.5)

## 2018-02-14 PROCEDURE — 99223 1ST HOSP IP/OBS HIGH 75: CPT | Mod: GC

## 2018-02-14 RX ORDER — POLYETHYLENE GLYCOL 3350 17 G/17G
17 POWDER, FOR SOLUTION ORAL ONCE
Qty: 0 | Refills: 0 | Status: COMPLETED | OUTPATIENT
Start: 2018-02-14 | End: 2018-02-14

## 2018-02-14 RX ORDER — ACETAMINOPHEN 500 MG
650 TABLET ORAL EVERY 6 HOURS
Qty: 0 | Refills: 0 | Status: DISCONTINUED | OUTPATIENT
Start: 2018-02-14 | End: 2018-02-14

## 2018-02-14 RX ORDER — SENNA PLUS 8.6 MG/1
1 TABLET ORAL AT BEDTIME
Qty: 0 | Refills: 0 | Status: DISCONTINUED | OUTPATIENT
Start: 2018-02-14 | End: 2018-02-17

## 2018-02-14 RX ORDER — CEFEPIME 1 G/1
2000 INJECTION, POWDER, FOR SOLUTION INTRAMUSCULAR; INTRAVENOUS EVERY 8 HOURS
Qty: 0 | Refills: 0 | Status: DISCONTINUED | OUTPATIENT
Start: 2018-02-14 | End: 2018-02-14

## 2018-02-14 RX ORDER — CEFEPIME 1 G/1
INJECTION, POWDER, FOR SOLUTION INTRAMUSCULAR; INTRAVENOUS
Qty: 0 | Refills: 0 | Status: DISCONTINUED | OUTPATIENT
Start: 2018-02-14 | End: 2018-02-14

## 2018-02-14 RX ORDER — SODIUM CHLORIDE 9 MG/ML
1000 INJECTION INTRAMUSCULAR; INTRAVENOUS; SUBCUTANEOUS
Qty: 0 | Refills: 0 | Status: DISCONTINUED | OUTPATIENT
Start: 2018-02-14 | End: 2018-02-16

## 2018-02-14 RX ORDER — DOCUSATE SODIUM 100 MG
100 CAPSULE ORAL DAILY
Qty: 0 | Refills: 0 | Status: DISCONTINUED | OUTPATIENT
Start: 2018-02-14 | End: 2018-02-17

## 2018-02-14 RX ORDER — CEFEPIME 1 G/1
1000 INJECTION, POWDER, FOR SOLUTION INTRAMUSCULAR; INTRAVENOUS EVERY 12 HOURS
Qty: 0 | Refills: 0 | Status: DISCONTINUED | OUTPATIENT
Start: 2018-02-14 | End: 2018-02-16

## 2018-02-14 RX ORDER — IBUPROFEN 200 MG
400 TABLET ORAL EVERY 6 HOURS
Qty: 0 | Refills: 0 | Status: DISCONTINUED | OUTPATIENT
Start: 2018-02-14 | End: 2018-02-17

## 2018-02-14 RX ORDER — CEFEPIME 1 G/1
1000 INJECTION, POWDER, FOR SOLUTION INTRAMUSCULAR; INTRAVENOUS EVERY 12 HOURS
Qty: 0 | Refills: 0 | Status: DISCONTINUED | OUTPATIENT
Start: 2018-02-14 | End: 2018-02-14

## 2018-02-14 RX ORDER — CEFEPIME 1 G/1
2000 INJECTION, POWDER, FOR SOLUTION INTRAMUSCULAR; INTRAVENOUS ONCE
Qty: 0 | Refills: 0 | Status: COMPLETED | OUTPATIENT
Start: 2018-02-14 | End: 2018-02-14

## 2018-02-14 RX ADMIN — CEFEPIME 100 MILLIGRAM(S): 1 INJECTION, POWDER, FOR SOLUTION INTRAMUSCULAR; INTRAVENOUS at 19:53

## 2018-02-14 RX ADMIN — Medication 40 MILLIGRAM(S): at 05:05

## 2018-02-14 RX ADMIN — Medication 400 MILLIGRAM(S): at 15:19

## 2018-02-14 RX ADMIN — Medication 400 MILLIGRAM(S): at 23:24

## 2018-02-14 RX ADMIN — POLYETHYLENE GLYCOL 3350 17 GRAM(S): 17 POWDER, FOR SOLUTION ORAL at 10:49

## 2018-02-14 RX ADMIN — SODIUM CHLORIDE 120 MILLILITER(S): 9 INJECTION INTRAMUSCULAR; INTRAVENOUS; SUBCUTANEOUS at 19:53

## 2018-02-14 RX ADMIN — CEFEPIME 100 MILLIGRAM(S): 1 INJECTION, POWDER, FOR SOLUTION INTRAMUSCULAR; INTRAVENOUS at 09:08

## 2018-02-14 RX ADMIN — Medication 400 MILLIGRAM(S): at 11:54

## 2018-02-14 RX ADMIN — Medication 400 MILLIGRAM(S): at 23:25

## 2018-02-14 RX ADMIN — Medication 100 MILLIGRAM(S): at 18:28

## 2018-02-14 RX ADMIN — Medication 400 MILLIGRAM(S): at 18:28

## 2018-02-14 RX ADMIN — SENNA PLUS 1 TABLET(S): 8.6 TABLET ORAL at 21:38

## 2018-02-14 RX ADMIN — Medication 400 MILLIGRAM(S): at 18:29

## 2018-02-14 NOTE — CONSULT NOTE ADULT - SUBJECTIVE AND OBJECTIVE BOX
NOT COMPLETE      HPI:  ID consult was called to evaluate this 23 y/o female from home for fevers and pancytopenia, fevers and ?UTI. Patient has significant medical hx for recently diagnosed SLE in 2017. Presented to [facility] yesterday afternoon with c/o persistent fevers for the past week. States fevers at home have been around 102-103F and would sometimes be relieved with Advil. Also had assoc. chills, malaise, N, V, decreased appetite and mild dyspnea.  Has +U/A, all cultures still pending. Began with fevers this am.    As per H&P:  22yr old F from home, ambulates independtly with PMH of Systemic lupus erythematosus (diagnosed in Aug 2017, was started on steroids and hydroxychloroquine) came with c/o fever for 1 week. She states that since 1 weeks she is having constant fluctuating fevers of about 102-103F that was sometimes relieved with Ibuprofen. Fever is associated with severe chills, rigors and shivering, Nausea, vomited 2wice with food particles, non-productive cough, mild dyspnea on walking fatigue, anorexia and constipation ( last BM last week ago). She has chronic arthralgias, generalized  body ache, morning stiffness in legs and arms due to lupus. She is on prednisone but she never took hydroxychloroquine. She went to [place] on . She denies Night sweats, palpitations, diarrhea,  headache, abdominal pain or any sick contacts. She also told she went to her Obgyn 3 weeks and was treated for vaginal candidiasis. In ED: patient's vitals sign were stable, Labs remarkable for ESR of 103, pancytopenia WBC of 2.9, RBC: 3.42, Platelets 85, Hemoglobin of 9.4, Urinalysis is positive for UTI. When patient was seen by me she was in mild distress but lying comfortably (2018 23:03)    REVIEW OF SYSTEMS:  [  ] Not able to illicit  General:	  Chest:	  GI:	  :  Skin:	  Musculoskeletal:	  Neuro:    PAST MEDICAL & SURGICAL HISTORY:  SLE (systemic lupus erythematosus)  UTI (urinary tract infection)  No significant past surgical history    ALLERGIES: No Known Allergies    MEDS:   cefepime  IVPB 2000 milliGRAM(s) IV Intermittent every 8 hours ()  ibuprofen  Tablet 400 milliGRAM(s) Oral every 6 hours  predniSONE   Tablet 40 milliGRAM(s) Oral daily    SOCIAL HISTORY:  Smoker: none     FAMILY HISTORY:  No pertinent family history in first degree relatives    VITALS:  Vital Signs Last 24 Hrs  T(C): 39.1 (2018 07:57), Max: 39.2 (2018 05:56)  T(F): 102.3 (2018 07:57), Max: 102.5 (2018 05:56)  HR: 107 (2018 07:57) (88 - 115)  BP: 95/58 (2018 07:57) (95/58 - 105/64)  BP(mean): --  RR: 18 (2018 07:57) (18 - 18)  SpO2: 100% (2018 07:57) (100% - 100%)      PHYSICAL EXAM:  Constitutional:  HEENT:  Neck:  Respiratory:  Cardiovascular:  Gastrointestinal:  Extremities:  Skin:  Ortho:  Neuro:      LABS/DIAGNOSTIC TESTS:                        8.5    2.1   )-----------( 84       ( 2018 07:11 )             26.8     WBC Count: 2.1 K/uL ( @ 07:11)  WBC Count: 2.9 K/uL ( @ 17:31)        136  |  103  |  8   ----------------------------<  82  3.9   |  26  |  0.61    Ca    7.6<L>      2018 07:11  Phos  2.7       Mg     2.3         TPro  9.2<H>  /  Alb  2.5<L>  /  TBili  0.5  /  DBili  x   /  AST  44<H>  /  ALT  15  /  AlkPhos  57  02-13    Urinalysis Basic - ( 2018 17:31 )    Color: Yellow / Appearance: Clear / S.010 / pH: x  Gluc: x / Ketone: Small  / Bili: Small / Urobili: 1   Blood: x / Protein: 30 mg/dL / Nitrite: Negative   Leuk Esterase: Small / RBC: 5-10 /HPF / WBC 11-25 /HPF   Sq Epi: x / Non Sq Epi: Moderate /HPF / Bacteria: Many /HPF    LIVER FUNCTIONS - ( 2018 17:31 )  Alb: 2.5 g/dL / Pro: 9.2 g/dL / ALK PHOS: 57 U/L / ALT: 15 U/L DA / AST: 44 U/L / GGT: x           Sedimentation Rate, Erythrocyte (18 @ 19:44)    Sedimentation Rate, Erythrocyte: 103 mm/Hr      CULTURES:    BC - pending   UC - pending      RADIOLOGY:  EXAM:  XR CHEST PA LAT 2V                        PROCEDURE DATE:  2018    INTERPRETATION:  PROCEDURE: PA and lateral views of the chest.    CLINICAL INFORMATION: Fever.    COMPARISON: 2017.    FINDINGS:        Lungs: The lungs are clear.  Heart: The heart is normal in size.  Mediastinum: The mediastinum is within normal limits.    IMPRESSION:    Clear lungs. HPI:  ID consult was called to evaluate this 21 y/o female from home for fevers and pancytopenia, fevers and ?UTI. Patient has significant medical hx for recently diagnosed SLE in 2017. Presented to [facility] yesterday afternoon with c/o persistent fevers for the past week. States fevers at home have been around 102-103F and would sometimes be relieved with Advil. Also had assoc. chills, malaise, N, V, decreased appetite and mild dyspnea.  Has +U/A, all cultures still pending. Began with fevers this am.    As per H&P:  22yr old F from home, ambulates independtly with PMH of Systemic lupus erythematosus (diagnosed in Aug 2017, was started on steroids and hydroxychloroquine) came with c/o fever for 1 week. She states that since 1 weeks she is having constant fluctuating fevers of about 102-103F that was sometimes relieved with Ibuprofen. Fever is associated with severe chills, rigors and shivering, Nausea, vomited 2wice with food particles, non-productive cough, mild dyspnea on walking fatigue, anorexia and constipation ( last BM last week ago). She has chronic arthralgias, generalized  body ache, morning stiffness in legs and arms due to lupus. She is on prednisone but she never took hydroxychloroquine. She went to [place] on . She denies Night sweats, palpitations, diarrhea,  headache, abdominal pain or any sick contacts. She also told she went to her Obgyn 3 weeks and was treated for vaginal candidiasis. In ED: patient's vitals sign were stable, Labs remarkable for ESR of 103, pancytopenia WBC of 2.9, RBC: 3.42, Platelets 85, Hemoglobin of 9.4, Urinalysis is positive for UTI. When patient was seen by me she was in mild distress but lying comfortably (2018 23:03)    REVIEW OF SYSTEMS:  [  ] Not able to illicit  General: no fevers no malaise   Chest: +cough +sob no CP  GI:  +nv no d no abdominal pain  : no urinary symptoms   Skin: no rashes no cyanosis  Musculoskeletal: no trauma no LBP +myalgia  Neuro: no ha's no dizziness     PAST MEDICAL & SURGICAL HISTORY:  SLE (systemic lupus erythematosus)  UTI (urinary tract infection)  No significant past surgical history    ALLERGIES: No Known Allergies    MEDS:   cefepime  IVPB 2000 milliGRAM(s) IV Intermittent every 8 hours ()  ibuprofen  Tablet 400 milliGRAM(s) Oral every 6 hours  predniSONE   Tablet 40 milliGRAM(s) Oral daily    SOCIAL HISTORY:  Smoker: none     FAMILY HISTORY:  No pertinent family history in first degree relatives    VITALS:  Vital Signs Last 24 Hrs  T(C): 39.1 (2018 07:57), Max: 39.2 (2018 05:56)  T(F): 102.3 (2018 07:57), Max: 102.5 (2018 05:56)  HR: 107 (2018 07:57) (88 - 115)  BP: 95/58 (2018 07:57) (95/58 - 105/64)  BP(mean): --  RR: 18 (2018 07:57) (18 - 18)  SpO2: 100% (2018 07:57) (100% - 100%)      PHYSICAL EXAM:  Constitutional: young female in NAD  HEENT: normal pharynx and buccal mucosa  Neck: supple no LN's   Respiratory: lungs clear no rales  Cardiovascular: S1 S2 reg no murmurs  Gastrointestinal: +BS with soft, nondistended abdomen; nontender  Extremities: no edema  Skin: no rashes  Ortho: n/a  Neuro: AAO x 3      LABS/DIAGNOSTIC TESTS:                        8.5    2.1   )-----------( 84       ( 2018 07:11 )             26.8     WBC Count: 2.1 K/uL ( @ 07:11)  WBC Count: 2.9 K/uL ( @ 17:31)        136  |  103  |  8   ----------------------------<  82  3.9   |  26  |  0.61    Ca    7.6<L>      2018 07:11  Phos  2.7       Mg     2.3         TPro  9.2<H>  /  Alb  2.5<L>  /  TBili  0.5  /  DBili  x   /  AST  44<H>  /  ALT  15  /  AlkPhos  57      Urinalysis Basic - ( 2018 17:31 )    Color: Yellow / Appearance: Clear / S.010 / pH: x  Gluc: x / Ketone: Small  / Bili: Small / Urobili: 1   Blood: x / Protein: 30 mg/dL / Nitrite: Negative   Leuk Esterase: Small / RBC: 5-10 /HPF / WBC 11-25 /HPF   Sq Epi: x / Non Sq Epi: Moderate /HPF / Bacteria: Many /HPF    LIVER FUNCTIONS - ( 2018 17:31 )  Alb: 2.5 g/dL / Pro: 9.2 g/dL / ALK PHOS: 57 U/L / ALT: 15 U/L DA / AST: 44 U/L / GGT: x           Sedimentation Rate, Erythrocyte (18 @ 19:44)    Sedimentation Rate, Erythrocyte: 103 mm/Hr      CULTURES:    BC - pending   UC - pending      RADIOLOGY:  EXAM:  XR CHEST PA LAT 2V                        PROCEDURE DATE:  2018    INTERPRETATION:  PROCEDURE: PA and lateral views of the chest.    CLINICAL INFORMATION: Fever.    COMPARISON: 2017.    FINDINGS:        Lungs: The lungs are clear.  Heart: The heart is normal in size.  Mediastinum: The mediastinum is within normal limits.    IMPRESSION:    Clear lungs.

## 2018-02-14 NOTE — PROGRESS NOTE ADULT - PROBLEM SELECTOR PLAN 1
S/p Prednisone & Naproxen DC'd  Cefepime decreased per ID, following, appreciated-  Ucx & blood cx pending-f/u results  Resumed Ibuprofen

## 2018-02-14 NOTE — PROGRESS NOTE ADULT - SUBJECTIVE AND OBJECTIVE BOX
NP Note discussed with  primary attending    22y old Female who presented with fever & constipation x 1 week.  LOS # 1.  Seen at bedside w/ Mother present.  Reports cough but not spitting up anything.  Reports pain all over body x 1 week, night sweats and chills.        INTERVAL HPI/OVERNIGHT EVENTS: Febrile this AM    MEDICATIONS  (STANDING):  cefepime  IVPB 1000 milliGRAM(s) IV Intermittent every 12 hours  docusate sodium 100 milliGRAM(s) Oral daily  ibuprofen  Tablet 400 milliGRAM(s) Oral every 6 hours  senna 1 Tablet(s) Oral at bedtime  sodium chloride 0.9%. 1000 milliLiter(s) (120 mL/Hr) IV Continuous <Continuous>    MEDICATIONS  (PRN):      __________________________________________________  REVIEW OF SYSTEMS:    CONSTITUTIONAL: Generalized pain relieved by Ibuprofen  EYES: No acute visual disturbances  NECK: No pain  RESPIRATORY: (+) cough; Chronic shortness of breath  CARDIOVASCULAR: No chest pain, no palpitations  GASTROINTESTINAL: No pain. No nausea or vomiting.  No diarrhea   NEUROLOGICAL: No headache or numbness, no tremors  MUSCULOSKELETAL: (+) joint pain, no muscle pain  GENITOURINARY: No dysuria, no frequency, no hesitancy  PSYCHIATRY: No depression , no anxiety  ALL OTHER  ROS negative        Vital Signs Last 24 Hrs  T(C): 37.3 (2018 15:10), Max: 39.2 (2018 05:56)  T(F): 99.2 (2018 15:10), Max: 102.5 (2018 05:56)  HR: 100 (2018 15:10) (88 - 115)  BP: 112/68 (2018 15:10) (95/58 - 112/68)  BP(mean): --  RR: 18 (2018 15:10) (18 - 18)  SpO2: 98% (2018 15:10) (98% - 100%)    ________________________________________________  PHYSICAL EXAM:  GENERAL: NAD  HEENT: Normocephalic;  conjunctivae and sclerae clear; moist mucous membranes;   CHEST/LUNG: Clear to auscultation bilaterally with good air entry   HEART: S1 S2, regular; no murmurs, gallops or rubs, Tachycardic  ABDOMEN: Soft, Nontender, Nondistended; Bowel sounds present x 4 quad  EXTREMITIES: No cyanosis; no edema; no calf tenderness  SKIN: Warm and dry  NERVOUS SYSTEM:  Awake and alert; Oriented  to place, person and time ; no new deficits    _________________________________________________  LABS:                        8.5    2.1   )-----------( 84       ( 2018 07:11 )             26.8     02-14    136  |  103  |  8   ----------------------------<  82  3.9   |  26  |  0.61    Ca    7.6<L>      2018 07:11  Phos  2.7     02-14  Mg     2.3     02-14    TPro  9.2<H>  /  Alb  2.5<L>  /  TBili  0.5  /  DBili  x   /  AST  44<H>  /  ALT  15  /  AlkPhos  57  02-13      Urinalysis Basic - ( 2018 17:31 )    Color: Yellow / Appearance: Clear / S.010 / pH: x  Gluc: x / Ketone: Small  / Bili: Small / Urobili: 1   Blood: x / Protein: 30 mg/dL / Nitrite: Negative   Leuk Esterase: Small / RBC: 5-10 /HPF / WBC 11-25 /HPF   Sq Epi: x / Non Sq Epi: Moderate /HPF / Bacteria: Many /HPF    RADIOLOGY & ADDITIONAL TESTS:    Consultant(s) Notes Reviewed:   YES  Care Discussed with Consultants :     Plan of care was discussed with patient and /or primary care giver; all questions and concerns were addressed and care was aligned with patient's wishes.

## 2018-02-14 NOTE — CONSULT NOTE ADULT - ASSESSMENT
1.	Fevers  2.	Pancytopenia  3.	?UTI  4.	SLE  ·	decrease maxipime to 1gm IV q8h D1  ·	awaiting culture results 1.	Fevers  2.	Pancytopenia  3.	?UTI  4.	SLE  ·	decreased maxipime to 1gm IV q12h  ·	awaiting culture results

## 2018-02-14 NOTE — ED ADULT NURSE REASSESSMENT NOTE - NS ED NURSE REASSESS COMMENT FT1
Patient refused Tylenol for her temperature of 102.5. Refused tylenol. MD Altamirano made aware of via Tim of refusal and that the patient is requesting to speak to him regarding her treatment plan.
Patient remains in no acute distress, able to speak in full sentences and breathe comfortably in room air. Verbalizes no medical complaints. Sleeping comfortably easily arousable to verbal and tactile stimuli.
Patient remains in no acute distress, with a comfortable appearance. Patient requested prednisone medication as part of her treatment. MD Altamirano made aware of her request via Amion.
Patient was received from TERA Turner. Patient is hemodynamically stable and in no acute distress, able to walk with a steady gait. Verbalizes no complaints.
1800 - pt sitting  by the chair with family member on bedside  no signs of any distress at this time. denies any pain nor discomfort.

## 2018-02-15 DIAGNOSIS — D70.9 NEUTROPENIA, UNSPECIFIED: ICD-10-CM

## 2018-02-15 LAB
DSDNA AB SER-ACNC: 655 IU/ML — HIGH
HCG UR QL: NEGATIVE — SIGNIFICANT CHANGE UP
HCT VFR BLD CALC: 27 % — LOW (ref 34.5–45)
HGB BLD-MCNC: 8.1 G/DL — LOW (ref 11.5–15.5)
MCHC RBC-ENTMCNC: 27.3 PG — SIGNIFICANT CHANGE UP (ref 27–34)
MCHC RBC-ENTMCNC: 29.9 GM/DL — LOW (ref 32–36)
MCV RBC AUTO: 91.4 FL — SIGNIFICANT CHANGE UP (ref 80–100)
PLATELET # BLD AUTO: 97 K/UL — LOW (ref 150–400)
RBC # BLD: 2.96 M/UL — LOW (ref 3.8–5.2)
RBC # FLD: 12.9 % — SIGNIFICANT CHANGE UP (ref 10.3–14.5)
RHEUMATOID FACT SERPL-ACNC: 15 IU/ML — HIGH (ref 0–13.9)
WBC # BLD: 2.2 K/UL — LOW (ref 3.8–10.5)
WBC # FLD AUTO: 2.2 K/UL — LOW (ref 3.8–10.5)

## 2018-02-15 PROCEDURE — 99233 SBSQ HOSP IP/OBS HIGH 50: CPT | Mod: GC

## 2018-02-15 RX ORDER — POLYETHYLENE GLYCOL 3350 17 G/17G
17 POWDER, FOR SOLUTION ORAL DAILY
Qty: 0 | Refills: 0 | Status: DISCONTINUED | OUTPATIENT
Start: 2018-02-15 | End: 2018-02-17

## 2018-02-15 RX ADMIN — Medication 400 MILLIGRAM(S): at 13:40

## 2018-02-15 RX ADMIN — SENNA PLUS 1 TABLET(S): 8.6 TABLET ORAL at 21:10

## 2018-02-15 RX ADMIN — Medication 400 MILLIGRAM(S): at 05:27

## 2018-02-15 RX ADMIN — POLYETHYLENE GLYCOL 3350 17 GRAM(S): 17 POWDER, FOR SOLUTION ORAL at 13:40

## 2018-02-15 RX ADMIN — SODIUM CHLORIDE 120 MILLILITER(S): 9 INJECTION INTRAMUSCULAR; INTRAVENOUS; SUBCUTANEOUS at 02:40

## 2018-02-15 RX ADMIN — Medication 1 ENEMA: at 23:12

## 2018-02-15 RX ADMIN — Medication 400 MILLIGRAM(S): at 14:41

## 2018-02-15 RX ADMIN — Medication 400 MILLIGRAM(S): at 17:43

## 2018-02-15 RX ADMIN — SODIUM CHLORIDE 120 MILLILITER(S): 9 INJECTION INTRAMUSCULAR; INTRAVENOUS; SUBCUTANEOUS at 21:15

## 2018-02-15 RX ADMIN — CEFEPIME 100 MILLIGRAM(S): 1 INJECTION, POWDER, FOR SOLUTION INTRAMUSCULAR; INTRAVENOUS at 02:40

## 2018-02-15 RX ADMIN — SODIUM CHLORIDE 120 MILLILITER(S): 9 INJECTION INTRAMUSCULAR; INTRAVENOUS; SUBCUTANEOUS at 17:49

## 2018-02-15 RX ADMIN — Medication 400 MILLIGRAM(S): at 23:17

## 2018-02-15 RX ADMIN — CEFEPIME 100 MILLIGRAM(S): 1 INJECTION, POWDER, FOR SOLUTION INTRAMUSCULAR; INTRAVENOUS at 13:43

## 2018-02-15 RX ADMIN — Medication 400 MILLIGRAM(S): at 23:12

## 2018-02-15 RX ADMIN — Medication 100 MILLIGRAM(S): at 13:40

## 2018-02-15 NOTE — PROGRESS NOTE ADULT - ASSESSMENT
22yr old F from home, ambulates independtly with PMH of Systemic lupus erythematosus (diagnosed in Aug 2017, was started on steroids and hydroxychloroquine) came with c/o fever for 1 week. She states that since 1 weeks she is having constant fluctuating fevers of about 102-103F that was sometimes relieved with Ibuprofen. Fever is associated with severe chills, rigors and shivering. She has chronic arthralgias, generalized  body ache, morning stiffness in legs and arms due to lupus. She is on prednisone but she never took hydroxychloroquine since her Rheumatologist did not explain it to her. Denies night sweats, palpitations, diarrhea,  headache, abdominal pain or any sick contacts. She also told she went to her Ob-gyn 3 weeks and was treated for vaginal candidiasis.    In ED: patient's vitals sign were stable, Labs remarkable for ESR of 103, pancytopenia WBC of 2.9, RBC: 3.42, Platelets 85, Hemoglobin of 9.4, Urinalysis is positive for UTI. When patient was seen by me she was in mild distress but lying comfortably    Patient is admitted for concerns of lupus flare and possible septicemia with UTI    Intern Statement:  Infectious disease workup negative. Will D/C cefepime. Fever likely related to Lupus Flare. No fever documented during the floor. Rheumatologist Dr Levin Consulted. No other complaints 22yr old F from home, ambulates independtly with PMH of Systemic lupus erythematosus (diagnosed in Aug 2017, was started on steroids and hydroxychloroquine) came with c/o fever for 1 week. She states that since 1 weeks she is having constant fluctuating fevers of about 102-103F that was sometimes relieved with Ibuprofen. Fever is associated with severe chills, rigors and shivering. She has chronic arthralgias, generalized  body ache, morning stiffness in legs and arms due to lupus. She is on prednisone but she never took hydroxychloroquine since her Rheumatologist did not explain it to her. Denies night sweats, palpitations, diarrhea,  headache, abdominal pain or any sick contacts. She also told she went to her Ob-gyn 3 weeks and was treated for vaginal candidiasis.    In ED: patient's vitals sign were stable, Labs remarkable for ESR of 103, pancytopenia WBC of 2.9, RBC: 3.42, Platelets 85, Hemoglobin of 9.4, Urinalysis is positive for UTI. When patient was seen by me she was in mild distress but lying comfortably    Patient is admitted for concerns of lupus flare and possible septicemia with UTI    Intern Statement:  Infectious disease workup negative. Will manage with cefepime neutropenic fever. Fever likely related to Lupus Flare. No fever documented during the floor. Rheumatologist Dr Levin Consulted. No other complaints

## 2018-02-15 NOTE — PROGRESS NOTE ADULT - ATTENDING COMMENTS
Agree with above, except:   Patient is seen and examined at bedside. Feeling better, no fever overnight. Still complaining of fatigue     ROS is negative except fatigue   PE:   General: Thin young lady, NAD   Neck: supple  Cardio: Regular rate and rhythm, no murmur   Pulm: Clear breath sounds, no wheezing   GI/: abdomen is soft, non tender, BS (+)   Extr: no joint pain, no edema, no rash   NEuro: AO, no focal weakness     Vital Signs Last 24 Hrs  T(C): 36.4 (15 Feb 2018 08:32), Max: 37.3 (14 Feb 2018 15:10)  T(F): 97.6 (15 Feb 2018 08:32), Max: 99.2 (14 Feb 2018 15:10)  HR: 72 (15 Feb 2018 08:32) (72 - 100)  BP: 90/60 (15 Feb 2018 08:32) (90/60 - 112/68)  BP(mean): --  RR: 17 (15 Feb 2018 08:32) (17 - 18)  SpO2: 100% (15 Feb 2018 08:32) (96% - 100%)    1. Fever likely secondary to Lupus flair vs infection  No source of infection identified yet   UA (-), BC, UC and RVP all negative   CXR: no lung disease   c/w Cefepime 100 mg IV twice daily  for now   Currently not on prednisone   IV fluids   Ibuprofen    2. Pancytopenia; recent viral infection vs SLE flair??     patient is non complaint with her SLE meds. She does not have a rheumatologist to follow. Gave her the numbers for Dr. Padron - rheumatologist at [facility]. She will follow up with him upon discharge from the hospital Agree with above, except:   Patient is seen and examined at bedside. Feeling better, no fever overnight. Still complaining of fatigue     ROS is negative except fatigue   PE:   General: Thin young lady, NAD   Neck: supple  Cardio: Regular rate and rhythm, no murmur   Pulm: Clear breath sounds, no wheezing   GI/: abdomen is soft, non tender, BS (+)   Extr: no joint pain, no edema, no rash   NEuro: AO, no focal weakness     Vital Signs Last 24 Hrs  T(C): 36.4 (15 Feb 2018 08:32), Max: 37.3 (14 Feb 2018 15:10)  T(F): 97.6 (15 Feb 2018 08:32), Max: 99.2 (14 Feb 2018 15:10)  HR: 72 (15 Feb 2018 08:32) (72 - 100)  BP: 90/60 (15 Feb 2018 08:32) (90/60 - 112/68)  BP(mean): --  RR: 17 (15 Feb 2018 08:32) (17 - 18)  SpO2: 100% (15 Feb 2018 08:32) (96% - 100%)    1. Fever likely secondary to Lupus flair vs infection  No source of infection identified yet   UA (-), BC, UC and RVP all negative   CXR: no lung disease   c/w Cefepime 100 mg IV twice daily  for now   Currently not on prednisone   IV fluids   Ibuprofen    2. Pancytopenia; recent viral infection vs SLE flair??     patient is non complaint with her SLE meds. She does not have a rheumatologist to follow. Gave her the numbers for Dr. Padron - rheumatologist at [facility]. She will follow up with him upon discharge from the hospital    Addendum: 5:20PM: Will check patients compliment level C3, C4, if low, can start Prednisone 60 mg PO daily with slow taper.

## 2018-02-15 NOTE — PROGRESS NOTE ADULT - PROBLEM SELECTOR PLAN 2
Patient's UA positive for UTI, patient did not have any Urinary symptoms  Urine culture: < 50,000 Coag negative staph   Will D/C Cefepime Will manage with Cefepime  CBC daily

## 2018-02-15 NOTE — PROGRESS NOTE ADULT - PROBLEM SELECTOR PLAN 1
Patient presented with fever for 1 week with pancytopenia, arthralgia, joint stiffness, increased ESR, with physical examination findings of joint tenderness. most likely lupus flare. Patient was given steroids and hydroxychloroquine but she is non compliant to hydroxchloroquine.  Blood cultures Negative   HIV negative, EBV negative, CMV serology negative  Started prednisone, Naproxen for morning stiffness  Started broad spectrum antibiotic cefepime for concern of sepsis  ID Dr Mckeon

## 2018-02-15 NOTE — PROGRESS NOTE ADULT - PROBLEM SELECTOR PLAN 4
RISK                                                          Points  [  ] Previous VTE                                                3  [  ] Thrombophilia                                             2  [  ] Lower limb paralysis                                   2        (unable to hold up >15 seconds)    [  ] Current Cancer                                             2         (within 6 months)  [ x ] Immobilization > 24 hrs                              1  [  ] ICU/CCU stay > 24 hours                             1  [  ] Age > 60                                                         1    IMPROVE VTE Score: 1  No need for chemical prophylaxis Senna  Miralax  Colace

## 2018-02-15 NOTE — PROGRESS NOTE ADULT - SUBJECTIVE AND OBJECTIVE BOX
==================PGY 1 Note===================   Discussed with supervising resident and primary attending    ================CHIEF COMPLAINT===============  Patient is a 22y old  Female who presents with a chief complaint of Fever (2018 23:03)        =========INTERVAL HPI/OVERNIGHT EVENTS=========  Offers no new complaints; current symptoms resolving      ============CURRENT MEDICATIONS===============    MEDICATIONS  (STANDING):  cefepime  IVPB 1000 milliGRAM(s) IV Intermittent every 12 hours  docusate sodium 100 milliGRAM(s) Oral daily  ibuprofen  Tablet 400 milliGRAM(s) Oral every 6 hours  senna 1 Tablet(s) Oral at bedtime  sodium chloride 0.9%. 1000 milliLiter(s) (120 mL/Hr) IV Continuous <Continuous>    MEDICATIONS  (PRN):        ============REVIEW OF SYSTEMS==================    CONSTITUTIONAL: No fever  EYES: no acute visual disturbances  NECK: No pain or stiffness  RESPIRATORY: No cough; No shortness of breath  CARDIOVASCULAR: No chest pain, no palpitations  GASTROINTESTINAL: No pain. No nausea or vomiting; No diarrhea   NEUROLOGICAL: No headache or numbness, no tremors  MUSCULOSKELETAL: No joint pain, no muscle pain  GENITOURINARY: no dysuria, no frequency, no hesitancy  PSYCHIATRY: no depression , no anxiety  ALL OTHER  ROS negative      ================VITALS SIGNS=====================  Vital Signs Last 24 Hrs  T(C): 36.4 (15 Feb 2018 08:32), Max: 37.3 (2018 12:49)  T(F): 97.6 (15 Feb 2018 08:32), Max: 99.2 (2018 12:49)  HR: 72 (15 Feb 2018 08:32) (72 - 109)  BP: 90/60 (15 Feb 2018 08:32) (90/60 - 112/68)  BP(mean): --  RR: 17 (15 Feb 2018 08:32) (17 - 18)  SpO2: 100% (15 Feb 2018 08:32) (96% - 100%)    ===============PHYSICAL EXAM====================    GENERAL: NAD  HEENT: Normocephalic;  conjunctivae and sclerae clear; moist mucous membranes;   NECK : supple  CHEST/LUNG: Clear to auscultation bilaterally with good air entry   HEART: S1 S2  regular; no murmurs, gallops or rubs  ABDOMEN: Soft, Nontender, Nondistended; Bowel sounds present  EXTREMITIES: no cyanosis; no edema; no calf tenderness  SKIN: warm and dry; no rash  NERVOUS SYSTEM:  Awake and alert; Oriented  to place, person and time ; no new deficits    ==============LABORATORIES======================  LABS:                        8.1    2.2   )-----------( 97       ( 15 Feb 2018 07:14 )             27.0     02-14    136  |  103  |  8   ----------------------------<  82  3.9   |  26  |  0.61    Ca    7.6<L>      2018 07:11  Phos  2.7     02-14  Mg     2.3     02-14    TPro  9.2<H>  /  Alb  2.5<L>  /  TBili  0.5  /  DBili  x   /  AST  44<H>  /  ALT  15  /  AlkPhos  57  02-13      Urinalysis Basic - ( 2018 17:31 )    Color: Yellow / Appearance: Clear / S.010 / pH: x  Gluc: x / Ketone: Small  / Bili: Small / Urobili: 1   Blood: x / Protein: 30 mg/dL / Nitrite: Negative   Leuk Esterase: Small / RBC: 5-10 /HPF / WBC 11-25 /HPF   Sq Epi: x / Non Sq Epi: Moderate /HPF / Bacteria: Many /HPF      CAPILLARY BLOOD GLUCOSE          =============INPUTS/OUPUTS=====================        RADIOLOGY & ADDITIONAL TESTS:    Imaging Personally Reviewed:  YES    Consultant(s) Notes Reviewed:   YES    Care Discussed with Consultants : YES    Plan of care was discussed with patient  and /or primary care giver; all questions and concerns were addressed and care was aligned with patient's wishes. Time was allowed for questions that were answered to the best of my abilities

## 2018-02-15 NOTE — PROGRESS NOTE ADULT - PROBLEM SELECTOR PLAN 3
Senna  Miralax  Colace Patient's UA positive for UTI, patient did not have any Urinary symptoms  Urine culture: < 50,000 Coag negative staph

## 2018-02-15 NOTE — PROGRESS NOTE ADULT - SUBJECTIVE AND OBJECTIVE BOX
21 y/o female is under our care for fevers, pancytopenia and history of SLE. Patient is doing slightly better today and admits that cough, dizziness and dyspnea had improved.  Has not had any fevers for the past 24 hours now. UC is with no significant growth.     REVIEW OF SYSTEMS:  [  ] Not able to illicit  General: no fevers +malaise   Chest: +cough +sob no CP  GI:  no nvd no abdominal pain  : no urinary symptoms   Neuro: no ha's +dizziness     ALLERGIES: No Known Allergies    MEDS:   cefepime IVPB 2000 milliGRAM(s) IV Intermittent every 8 hours (2/14)    VITALS:  Vital Signs Last 24 Hrs  T(C): 36.4 (15 Feb 2018 15:57), Max: 37 (15 Feb 2018 00:05)  T(F): 97.6 (15 Feb 2018 15:57), Max: 98.6 (15 Feb 2018 00:05)  HR: 82 (15 Feb 2018 15:57) (72 - 82)  BP: 84/55 (15 Feb 2018 15:57) (84/55 - 96/56)  BP(mean): --  RR: 16 (15 Feb 2018 15:57) (16 - 18)  SpO2: 100% (15 Feb 2018 15:57) (96% - 100%)      PHYSICAL EXAM:  HEENT: n/a  Neck: supple no LN's   Respiratory: lungs clear no rales  Cardiovascular: S1 S2 reg no murmurs  Gastrointestinal: +BS with soft, nondistended abdomen; nontender  Extremities: no edema  Skin: no rashes  Ortho: n/a  Neuro: AAO x 3      LABS/DIAGNOSTIC TESTS:                        8.1    2.2   )-----------( 97       ( 15 Feb 2018 07:14 )             27.0   02-14    136  |  103  |  8   ----------------------------<  82  3.9   |  26  |  0.61    Ca    7.6<L>      14 Feb 2018 07:11  Phos  2.7     02-14  Mg     2.3     02-14    TPro  9.2<H>  /  Alb  2.5<L>  /  TBili  0.5  /  DBili  x   /  AST  44<H>  /  ALT  15  /  AlkPhos  57  02-13      CULTURES:   .Urine Clean Catch (Midstream)  02-13 @ 23:02   10,000 - 49,000 CFU/mL Coag negative Staphylococcus not Staph  saprophyticus  --  --      .Blood Blood  02-13 @ 22:58   No growth to date.  --  --      RADIOLOGY: no new studies

## 2018-02-16 ENCOUNTER — TRANSCRIPTION ENCOUNTER (OUTPATIENT)
Age: 23
End: 2018-02-16

## 2018-02-16 LAB
ANA TITR SER: NEGATIVE — SIGNIFICANT CHANGE UP
ANION GAP SERPL CALC-SCNC: 5 MMOL/L — SIGNIFICANT CHANGE UP (ref 5–17)
BUN SERPL-MCNC: 5 MG/DL — LOW (ref 7–18)
C3 SERPL-MCNC: 45 MG/DL — LOW (ref 80–180)
C4 SERPL-MCNC: 6 MG/DL — LOW (ref 10–45)
CALCIUM SERPL-MCNC: 7.3 MG/DL — LOW (ref 8.4–10.5)
CHLORIDE SERPL-SCNC: 114 MMOL/L — HIGH (ref 96–108)
CO2 SERPL-SCNC: 26 MMOL/L — SIGNIFICANT CHANGE UP (ref 22–31)
CREAT SERPL-MCNC: 0.52 MG/DL — SIGNIFICANT CHANGE UP (ref 0.5–1.3)
GLUCOSE SERPL-MCNC: 81 MG/DL — SIGNIFICANT CHANGE UP (ref 70–99)
HCT VFR BLD CALC: 26.2 % — LOW (ref 34.5–45)
HCT VFR BLD CALC: 27 % — LOW (ref 34.5–45)
HGB BLD-MCNC: 8.4 G/DL — LOW (ref 11.5–15.5)
HGB BLD-MCNC: 8.5 G/DL — LOW (ref 11.5–15.5)
LYMPHOCYTES # BLD AUTO: 58 % — HIGH (ref 13–44)
MCHC RBC-ENTMCNC: 28.9 PG — SIGNIFICANT CHANGE UP (ref 27–34)
MCHC RBC-ENTMCNC: 29.5 PG — SIGNIFICANT CHANGE UP (ref 27–34)
MCHC RBC-ENTMCNC: 31 GM/DL — LOW (ref 32–36)
MCHC RBC-ENTMCNC: 32.3 GM/DL — SIGNIFICANT CHANGE UP (ref 32–36)
MCV RBC AUTO: 91.4 FL — SIGNIFICANT CHANGE UP (ref 80–100)
MCV RBC AUTO: 93.1 FL — SIGNIFICANT CHANGE UP (ref 80–100)
MONOCYTES NFR BLD AUTO: 12 % — SIGNIFICANT CHANGE UP (ref 2–14)
NEUTROPHILS NFR BLD AUTO: 29 % — LOW (ref 43–77)
NEUTROPHILS NFR BLD AUTO: SIGNIFICANT CHANGE UP % (ref 43–77)
PLATELET # BLD AUTO: 113 K/UL — LOW (ref 150–400)
PLATELET # BLD AUTO: 98 K/UL — LOW (ref 150–400)
POTASSIUM SERPL-MCNC: 4.2 MMOL/L — SIGNIFICANT CHANGE UP (ref 3.5–5.3)
POTASSIUM SERPL-SCNC: 4.2 MMOL/L — SIGNIFICANT CHANGE UP (ref 3.5–5.3)
RBC # BLD: 2.87 M/UL — LOW (ref 3.8–5.2)
RBC # BLD: 2.89 M/UL — LOW (ref 3.8–5.2)
RBC # FLD: 13.1 % — SIGNIFICANT CHANGE UP (ref 10.3–14.5)
RBC # FLD: 13.5 % — SIGNIFICANT CHANGE UP (ref 10.3–14.5)
SODIUM SERPL-SCNC: 145 MMOL/L — SIGNIFICANT CHANGE UP (ref 135–145)
WBC # BLD: 2.1 K/UL — LOW (ref 3.8–10.5)
WBC # BLD: 2.6 K/UL — LOW (ref 3.8–10.5)
WBC # FLD AUTO: 2.1 K/UL — LOW (ref 3.8–10.5)
WBC # FLD AUTO: 2.6 K/UL — LOW (ref 3.8–10.5)

## 2018-02-16 RX ORDER — HYDROXYCHLOROQUINE SULFATE 200 MG
200 TABLET ORAL DAILY
Qty: 0 | Refills: 0 | Status: DISCONTINUED | OUTPATIENT
Start: 2018-02-16 | End: 2018-02-16

## 2018-02-16 RX ORDER — SODIUM CHLORIDE 9 MG/ML
1000 INJECTION INTRAMUSCULAR; INTRAVENOUS; SUBCUTANEOUS
Qty: 0 | Refills: 0 | Status: DISCONTINUED | OUTPATIENT
Start: 2018-02-16 | End: 2018-02-17

## 2018-02-16 RX ORDER — HYDROXYCHLOROQUINE SULFATE 200 MG
400 TABLET ORAL DAILY
Qty: 0 | Refills: 0 | Status: DISCONTINUED | OUTPATIENT
Start: 2018-02-16 | End: 2018-02-17

## 2018-02-16 RX ORDER — LACTULOSE 10 G/15ML
20 SOLUTION ORAL ONCE
Qty: 0 | Refills: 0 | Status: COMPLETED | OUTPATIENT
Start: 2018-02-16 | End: 2018-02-16

## 2018-02-16 RX ADMIN — Medication 400 MILLIGRAM(S): at 13:09

## 2018-02-16 RX ADMIN — Medication 400 MILLIGRAM(S): at 01:40

## 2018-02-16 RX ADMIN — SODIUM CHLORIDE 120 MILLILITER(S): 9 INJECTION INTRAMUSCULAR; INTRAVENOUS; SUBCUTANEOUS at 21:25

## 2018-02-16 RX ADMIN — SENNA PLUS 1 TABLET(S): 8.6 TABLET ORAL at 21:24

## 2018-02-16 RX ADMIN — Medication 400 MILLIGRAM(S): at 17:46

## 2018-02-16 RX ADMIN — CEFEPIME 100 MILLIGRAM(S): 1 INJECTION, POWDER, FOR SOLUTION INTRAMUSCULAR; INTRAVENOUS at 03:47

## 2018-02-16 RX ADMIN — Medication 400 MILLIGRAM(S): at 05:44

## 2018-02-16 RX ADMIN — Medication 40 MILLIGRAM(S): at 13:08

## 2018-02-16 RX ADMIN — SODIUM CHLORIDE 120 MILLILITER(S): 9 INJECTION INTRAMUSCULAR; INTRAVENOUS; SUBCUTANEOUS at 05:47

## 2018-02-16 RX ADMIN — SODIUM CHLORIDE 120 MILLILITER(S): 9 INJECTION INTRAMUSCULAR; INTRAVENOUS; SUBCUTANEOUS at 18:36

## 2018-02-16 RX ADMIN — Medication 400 MILLIGRAM(S): at 17:45

## 2018-02-16 RX ADMIN — Medication 400 MILLIGRAM(S): at 18:45

## 2018-02-16 RX ADMIN — Medication 1 TABLET(S): at 13:53

## 2018-02-16 RX ADMIN — LACTULOSE 20 GRAM(S): 10 SOLUTION ORAL at 05:48

## 2018-02-16 RX ADMIN — Medication 100 MILLIGRAM(S): at 13:08

## 2018-02-16 RX ADMIN — POLYETHYLENE GLYCOL 3350 17 GRAM(S): 17 POWDER, FOR SOLUTION ORAL at 13:09

## 2018-02-16 RX ADMIN — Medication 400 MILLIGRAM(S): at 05:49

## 2018-02-16 NOTE — PROGRESS NOTE ADULT - ASSESSMENT
fevers - normalized  pancytopenia - platelets improving    plan - dc maxipime  start bactrim DS 1 tab po daily while on steroids (greater than 10 mgs) to prevent pcp pneumonia  plan to dc tomorrow am on prednisone taper over 4 week period

## 2018-02-16 NOTE — PROGRESS NOTE ADULT - SUBJECTIVE AND OBJECTIVE BOX
22y Female    Meds:  cefepime  IVPB 1000 milliGRAM(s) IV Intermittent every 12 hours    Allergies    No Known Allergies    Intolerances        VITALS:  Vital Signs Last 24 Hrs  T(C): 36.5 (16 Feb 2018 08:13), Max: 36.5 (16 Feb 2018 08:13)  T(F): 97.7 (16 Feb 2018 08:13), Max: 97.7 (16 Feb 2018 08:13)  HR: 67 (16 Feb 2018 08:13) (67 - 82)  BP: 94/61 (16 Feb 2018 08:13) (84/55 - 94/61)  BP(mean): --  RR: 17 (16 Feb 2018 08:13) (16 - 17)  SpO2: 100% (16 Feb 2018 08:13) (100% - 100%)    LABS/DIAGNOSTIC TESTS:                          8.4    2.1   )-----------( 113      ( 16 Feb 2018 12:30 )             27.0         02-16    145  |  114<H>  |  5<L>  ----------------------------<  81  4.2   |  26  |  0.52    Ca    7.3<L>      16 Feb 2018 07:45            CULTURES: .Urine Clean Catch (Midstream)  02-13 @ 23:02   10,000 - 49,000 CFU/mL Coag negative Staphylococcus not Staph  saprophyticus  --  --      .Blood Blood  02-13 @ 22:58   No growth to date.  --  --            RADIOLOGY:      ROS:  [  ] UNABLE TO ELICIT 22y Female who looks and feels great, she has had no fevers since admission but had gotten prednisone x 1 dose     Meds:  cefepime  IVPB 1000 milliGRAM(s) IV Intermittent every 12 hours    Allergies    No Known Allergies    Intolerances        VITALS:  Vital Signs Last 24 Hrs  T(C): 36.5 (16 Feb 2018 08:13), Max: 36.5 (16 Feb 2018 08:13)  T(F): 97.7 (16 Feb 2018 08:13), Max: 97.7 (16 Feb 2018 08:13)  HR: 67 (16 Feb 2018 08:13) (67 - 82)  BP: 94/61 (16 Feb 2018 08:13) (84/55 - 94/61)  BP(mean): --  RR: 17 (16 Feb 2018 08:13) (16 - 17)  SpO2: 100% (16 Feb 2018 08:13) (100% - 100%)    LABS/DIAGNOSTIC TESTS:                          8.4    2.1   )-----------( 113      ( 16 Feb 2018 12:30 )             27.0         02-16    145  |  114<H>  |  5<L>  ----------------------------<  81  4.2   |  26  |  0.52    Ca    7.3<L>      16 Feb 2018 07:45            CULTURES: .Urine Clean Catch (Midstream)  02-13 @ 23:02   10,000 - 49,000 CFU/mL Coag negative Staphylococcus not Staph  saprophyticus  --  --      .Blood Blood  02-13 @ 22:58   No growth to date.  --  --            RADIOLOGY:      ROS:  [  ] UNABLE TO ELICIT 22y Female who looks and feels great, she has had no fevers since admission but had gotten prednisone x 1 dose on day of admission, she has been on maxipime and all her cultures have been neg to date. she has no other complaints and feels energetic also. Her complement levels are testing but was started on prednisone today as it appears to be her lupus that is causing all her symptoms.     Meds:  cefepime  IVPB 1000 milliGRAM(s) IV Intermittent every 12 hours    Allergies    No Known Allergies    Intolerances        VITALS:  Vital Signs Last 24 Hrs  T(C): 36.5 (16 Feb 2018 08:13), Max: 36.5 (16 Feb 2018 08:13)  T(F): 97.7 (16 Feb 2018 08:13), Max: 97.7 (16 Feb 2018 08:13)  HR: 67 (16 Feb 2018 08:13) (67 - 82)  BP: 94/61 (16 Feb 2018 08:13) (84/55 - 94/61)  BP(mean): --  RR: 17 (16 Feb 2018 08:13) (16 - 17)  SpO2: 100% (16 Feb 2018 08:13) (100% - 100%)    LABS/DIAGNOSTIC TESTS:                          8.4    2.1   )-----------( 113      ( 16 Feb 2018 12:30 )             27.0         02-16    145  |  114<H>  |  5<L>  ----------------------------<  81  4.2   |  26  |  0.52    Ca    7.3<L>      16 Feb 2018 07:45            CULTURES: .Urine Clean Catch (Midstream)  02-13 @ 23:02   10,000 - 49,000 CFU/mL Coag negative Staphylococcus not Staph  saprophyticus  --  --      .Blood Blood  02-13 @ 22:58   No growth to date.  --  --            RADIOLOGY:      ROS:  [  ] UNABLE TO ELICIT

## 2018-02-16 NOTE — PROGRESS NOTE ADULT - PROBLEM SELECTOR PLAN 3
Patient's UA positive for UTI, patient did not have any Urinary symptoms  Urine culture: < 50,000 Coag negative staph

## 2018-02-16 NOTE — CONSULT NOTE ADULT - SUBJECTIVE AND OBJECTIVE BOX
Patient is a 22y old  Female who presents with a chief complaint of Fever (13 Feb 2018 23:03)       Subjective: Pt w known SLE admitted w a week of high fever. Called re pancytopenia. Baseline CBC parameters unk    REVIEW OF SYSTEMS:    CONSTITUTIONAL: No weakness,  pos fever  EYES/ENT: No visual changes;  No vertigo or throat pain   NECK: No pain or stiffness  RESPIRATORY: No cough, wheezing, hemoptysis; No shortness of breath  CARDIOVASCULAR: No chest pain or palpitations  GASTROINTESTINAL: No abdominal or epigastric pain. No nausea, vomiting, or hematemesis; No diarrhea or constipation. No melena or hematochezia.  GENITOURINARY: No dysuria, frequency or hematuria  NEUROLOGICAL: No numbness or weakness  SKIN: No itching, burning, rashes, or lesions     MEDICATIONS  (STANDING):  docusate sodium 100 milliGRAM(s) Oral daily  hydroxychloroquine 400 milliGRAM(s) Oral daily  ibuprofen  Tablet 400 milliGRAM(s) Oral every 6 hours  polyethylene glycol 3350 17 Gram(s) Oral daily  predniSONE   Tablet 40 milliGRAM(s) Oral daily  senna 1 Tablet(s) Oral at bedtime  sodium chloride 0.9%. 1000 milliLiter(s) (120 mL/Hr) IV Continuous <Continuous>  trimethoprim  160 mG/sulfamethoxazole 800 mG 1 Tablet(s) Oral daily    MEDICATIONS  (PRN):  sodium biphosphate Rectal Enema 1 Enema Rectal daily PRN Constipation      Allergies    No Known Allergies    Intolerances        Vital Signs Last 24 Hrs  T(C): 36.6 (16 Feb 2018 15:42), Max: 36.6 (16 Feb 2018 15:42)  T(F): 97.8 (16 Feb 2018 15:42), Max: 97.8 (16 Feb 2018 15:42)  HR: 75 (16 Feb 2018 15:42) (67 - 78)  BP: 91/53 (16 Feb 2018 15:42) (91/53 - 94/61)  BP(mean): --  RR: 16 (16 Feb 2018 15:42) (16 - 17)  SpO2: 100% (16 Feb 2018 15:42) (100% - 100%)    PHYSICAL EXAM  General: adult in NAD  HEENT: clear oropharynx, anicteric sclera, pink conjunctiva  Neck: supple  CV: normal S1/S2 with no murmur rubs or gallops  Lungs: positive air movement b/l ant lungs,clear to auscultation, no wheezes, no rales  Abdomen: soft non-tender non-distended, no hepatosplenomegaly  Ext: no clubbing cyanosis or edema  Skin: no rashes and no petechiae  Neuro: alert and oriented X 4, no focal deficits  LABS:                          8.4    2.1   )-----------( 113      ( 16 Feb 2018 12:30 )             27.0         Mean Cell Volume : 93.1 fl  Mean Cell Hemoglobin : 28.9 pg  Mean Cell Hemoglobin Concentration : 31.0 gm/dL  Auto Neutrophil # : x  Auto Lymphocyte # : x  Auto Monocyte # : x  Auto Eosinophil # : x  Auto Basophil # : x  Auto Neutrophil % : see prev. diff Differential percentages must be correlated with absolute numbers for  clinical significance. %  Auto Lymphocyte % : x  Auto Monocyte % : x  Auto Eosinophil % : x  Auto Basophil % : x    Serial CBC's  02-16 @ 12:30  Hct-27.0 / Hgb-8.4 / Plat-113 / RBC-2.89 / WBC-2.1          Serial CBC's  02-16 @ 07:45  Hct-26.2 / Hgb-8.5 / Plat-98 / RBC-2.87 / WBC-2.6          Serial CBC's  02-15 @ 07:14  Hct-27.0 / Hgb-8.1 / Plat-97 / RBC-2.96 / WBC-2.2          Serial CBC's  02-14 @ 07:11  Hct-26.8 / Hgb-8.5 / Plat-84 / RBC-2.91 / WBC-2.1          Serial CBC's  02-13 @ 17:31  Hct-31.6 / Hgb-9.4 / Plat-85 / RBC-3.42 / WBC-2.9            02-16    145  |  114<H>  |  5<L>  ----------------------------<  81  4.2   |  26  |  0.52    Ca    7.3<L>      16 Feb 2018 07:45            Vitamin B12, Serum: 503 pg/mL (02-14 @ 09:31)                          RADIOLOGY & ADDITIONAL STUDIES:

## 2018-02-16 NOTE — CONSULT NOTE ADULT - ASSESSMENT
imp/rec    Pancytopenia prob sec to SLE, SLE flare.. Baseline cbc unk. Plts up tp 113 on prednisone. WBC and Hb fairly stable.   Anemia may be multifactorial w contrib from chronic dz or ELAN ( though MCV nml). Hemolysis also poss.  Will check ferr retic imp/rec    Pancytopenia prob sec to SLE, SLE flare.. Baseline cbc unk. Plts up tp 113 on prednisone. WBC and Hb fairly stable.   Anemia may be multifactorial w contrib from chronic dz or ELAN ( though MCV nml). Hemolysis also poss.  Will check ferr retic    Increased globulin prob sec to inflamm from SLE. Will obtain SPEP.

## 2018-02-16 NOTE — PROGRESS NOTE ADULT - PROBLEM SELECTOR PLAN 1
lupus flare. currently off steroids  Blood cultures Negative   HIV negative, EBV negative, CMV serology negative  Naproxen for morning stiffness  Started broad spectrum antibiotic cefepime for concern of sepsis, day 3  ID Dr Mckeon lupus flare. restarted steroids, prednsione 40mg QD as d/w Dr Cornelio Aguirre, Will request heme-onc consult  Will also get CBC with Diff to look for reticulocytosis (bone [place] response to pancytopenia), and r/o parvovirus infection  Blood cultures Negative   HIV negative, EBV negative, CMV serology negative  Naproxen for morning stiffness  Started broad spectrum antibiotic cefepime for concern of sepsis, day 3  ID Dr Mckeon

## 2018-02-16 NOTE — DIETITIAN INITIAL EVALUATION ADULT. - PROBLEM SELECTOR PLAN 2
Patient's UA positive for UTI, patient did not have any Urinary symptoms  f/u Urine culture  Patient on maxipime

## 2018-02-16 NOTE — PROGRESS NOTE ADULT - ASSESSMENT
22yr old F from home, ambulates independtly with PMH of Systemic lupus erythematosus (diagnosed in Aug 2017, was started on steroids and hydroxychloroquine) came with c/o fever for 1 week.  Patient is admitted for concerns of lupus flare and neutropenic fever

## 2018-02-16 NOTE — DIETITIAN INITIAL EVALUATION ADULT. - OTHER INFO
Nutrition consult requested for unintentional wt. loss >10%. Patient from home lives with family. Visited pt. reports appetite poor x1wk with nausea/vomiting & fever, eating 2 small meals, persistent constipation PTA, stated  Lbs & loss 10 Lbs within week, in house pt. stated appetite improving, consuming 40% of meals, provided food choices & update kitchen, willing to have Ensure Clear with meals (do not Ensure Enlive due to dairy like taste), encourage po intake, had 2x BM last night, skin intact. D/W RN/MD.

## 2018-02-16 NOTE — DIETITIAN INITIAL EVALUATION ADULT. - PROBLEM SELECTOR PLAN 1
Patient presented with fever for 1 week with pancytopenia, arthralgia, joint stiffness, increased ESR, with physical examination findings of joint tenderness. most likely lupus flare,   - Patient was given steroids and hydroxychloroquine but she is non compliant to hydroxchloroquine.  -will also r/o bacteremia/ HIV/EBV/CMV as patient is on long term steroids so f/u Blood cultures, HIV, EBV,CMV serology  -Started prednisone, Naproxen for morning stiffness  Started broad spectrum antibiotic cefepime for concern of sepsis  -c/w tyelnol for fever  -Monitor CBC for thrombocytopenia, H/H drop,  ID: Dr mendoza

## 2018-02-16 NOTE — PROGRESS NOTE ADULT - ATTENDING COMMENTS
Patient was seen and examined at bedside, agree with above  Discussed with patient her condition at length, explained importance of medication compliance, she states she was not taking prednisone nor Hydroxycholorquine as she was not explained clearly that she should continue Hydroxychrloroquine in between flares.   She is in process to change her Rheumatologist and already has appt with Dr. Tovar on March first.   She was initiallyl diagnosed with lupus in august and decision made to start Plaquenil in November   Her pancytopenia is stable, continues to be on cefepime for gram negative coverage and her platelets are improving.   Complement levels sent and Prednisone started today.   Physical exam:   vitals  HEENT: Neck supple  Heart: S1 S2 normal  Abdomen: Nt, ND  Chest: Clear  LE No LE edema  A/p  1- Lupus: yet to clarifye flare based on complement level: pending.   continue with prednisone for now, Ds DNA elevate which correlate with disease activity.   2- febrile neutropenia: most likely fever is secondary to lupus rather then infection in light of negative cultures  ID input appreciated  3- Pancytopenia: check retic count to confirm Bone marrow function,  will check parvovirus b19 IgM, and IgG  Hem onc input appreciated.

## 2018-02-16 NOTE — PROGRESS NOTE ADULT - SUBJECTIVE AND OBJECTIVE BOX
SAVANA WATKINS   [id number]   22y/Female    Patient is a 22y old  Female who presents with a chief complaint of Fever (13 Feb 2018 23:03)                                                                                     INTERVAL HPI/OVERNIGHT EVENTS:  none, BP i has been on the lower side, afebrile overnight      Patient seen and examined at bedside.  Denies chest pain, palpitations, SOB, nausea, vomiting, abdominal pain.        T(C): 36.5 (02-16-18 @ 08:13), Max: 36.5 (02-16-18 @ 08:13)  HR: 67 (02-16-18 @ 08:13) (67 - 82)  BP: 94/61 (02-16-18 @ 08:13) (84/55 - 94/61)  RR: 17 (02-16-18 @ 08:13) (16 - 17)  SpO2: 100% (02-16-18 @ 08:13) (100% - 100%)  Wt(kg): --  I&O's Summary        REVIEW OF SYSTEMS:  No fever,   No cough, SOB  No chest pain, palpitations  No Abd pain, nausea, vomiting, No diarrhea or constipation                                                                                                    PHYSICAL EXAM    GENERAL: NAD  HEENT: Normocephalic;  conjunctivae and sclerae clear; moist mucous membranes;   NECK : supple  CHEST/LUNG: Clear to auscultation bilaterally with good air entry   HEART: S1 S2  regular; no murmurs, gallops or rubs  ABDOMEN: Soft, Nontender, Nondistended; Bowel sounds present  EXTREMITIES: no cyanosis; no edema; no calf tenderness  SKIN: warm and dry; no rash  NERVOUS SYSTEM:  Awake and alert; Oriented  to place, person and time ; no new deficits                                                                                                         LABS:                        8.1    2.2   )-----------( 97       ( 15 Feb 2018 07:14 )             27.0     02-16    x   |  x   |  x   ----------------------------<  x   x    |  x   |  0.52

## 2018-02-17 ENCOUNTER — TRANSCRIPTION ENCOUNTER (OUTPATIENT)
Age: 23
End: 2018-02-17

## 2018-02-17 VITALS
RESPIRATION RATE: 16 BRPM | HEART RATE: 69 BPM | OXYGEN SATURATION: 96 % | TEMPERATURE: 98 F | DIASTOLIC BLOOD PRESSURE: 61 MMHG | SYSTOLIC BLOOD PRESSURE: 93 MMHG

## 2018-02-17 DIAGNOSIS — D61.818 OTHER PANCYTOPENIA: ICD-10-CM

## 2018-02-17 LAB
ALBUMIN SERPL ELPH-MCNC: 1.9 G/DL — LOW (ref 3.5–5)
ALP SERPL-CCNC: 53 U/L — SIGNIFICANT CHANGE UP (ref 40–120)
ALT FLD-CCNC: 12 U/L DA — SIGNIFICANT CHANGE UP (ref 10–60)
ANION GAP SERPL CALC-SCNC: 6 MMOL/L — SIGNIFICANT CHANGE UP (ref 5–17)
AST SERPL-CCNC: 24 U/L — SIGNIFICANT CHANGE UP (ref 10–40)
BASOPHILS # BLD AUTO: 0 K/UL — SIGNIFICANT CHANGE UP (ref 0–0.2)
BASOPHILS NFR BLD AUTO: 0.8 % — SIGNIFICANT CHANGE UP (ref 0–2)
BILIRUB SERPL-MCNC: 0.1 MG/DL — LOW (ref 0.2–1.2)
BUN SERPL-MCNC: 6 MG/DL — LOW (ref 7–18)
CALCIUM SERPL-MCNC: 7.4 MG/DL — LOW (ref 8.4–10.5)
CHLORIDE SERPL-SCNC: 112 MMOL/L — HIGH (ref 96–108)
CO2 SERPL-SCNC: 26 MMOL/L — SIGNIFICANT CHANGE UP (ref 22–31)
CREAT SERPL-MCNC: 0.51 MG/DL — SIGNIFICANT CHANGE UP (ref 0.5–1.3)
EOSINOPHIL # BLD AUTO: 0 K/UL — SIGNIFICANT CHANGE UP (ref 0–0.5)
EOSINOPHIL NFR BLD AUTO: 0 % — SIGNIFICANT CHANGE UP (ref 0–6)
FERRITIN SERPL-MCNC: 380 NG/ML — HIGH (ref 15–150)
GLUCOSE SERPL-MCNC: 101 MG/DL — HIGH (ref 70–99)
HCT VFR BLD CALC: 28.7 % — LOW (ref 34.5–45)
HGB BLD-MCNC: 9 G/DL — LOW (ref 11.5–15.5)
LYMPHOCYTES # BLD AUTO: 0.8 K/UL — LOW (ref 1–3.3)
LYMPHOCYTES # BLD AUTO: 40.4 % — SIGNIFICANT CHANGE UP (ref 13–44)
MAGNESIUM SERPL-MCNC: 2.2 MG/DL — SIGNIFICANT CHANGE UP (ref 1.6–2.6)
MCHC RBC-ENTMCNC: 29.1 PG — SIGNIFICANT CHANGE UP (ref 27–34)
MCHC RBC-ENTMCNC: 31.2 GM/DL — LOW (ref 32–36)
MCV RBC AUTO: 93.1 FL — SIGNIFICANT CHANGE UP (ref 80–100)
MONOCYTES # BLD AUTO: 0.1 K/UL — SIGNIFICANT CHANGE UP (ref 0–0.9)
MONOCYTES NFR BLD AUTO: 7.5 % — SIGNIFICANT CHANGE UP (ref 2–14)
NEUTROPHILS # BLD AUTO: 1 K/UL — LOW (ref 1.8–7.4)
NEUTROPHILS NFR BLD AUTO: 51.3 % — SIGNIFICANT CHANGE UP (ref 43–77)
PHOSPHATE SERPL-MCNC: 3.4 MG/DL — SIGNIFICANT CHANGE UP (ref 2.5–4.5)
PLATELET # BLD AUTO: 138 K/UL — LOW (ref 150–400)
POTASSIUM SERPL-MCNC: 4 MMOL/L — SIGNIFICANT CHANGE UP (ref 3.5–5.3)
POTASSIUM SERPL-SCNC: 4 MMOL/L — SIGNIFICANT CHANGE UP (ref 3.5–5.3)
PROT SERPL-MCNC: 6.6 G/DL — SIGNIFICANT CHANGE UP (ref 6–8.3)
PROT SERPL-MCNC: 6.6 G/DL — SIGNIFICANT CHANGE UP (ref 6–8.3)
PROT SERPL-MCNC: 7.2 G/DL — SIGNIFICANT CHANGE UP (ref 6–8.3)
RBC # BLD: 3.08 M/UL — LOW (ref 3.8–5.2)
RBC # FLD: 13.2 % — SIGNIFICANT CHANGE UP (ref 10.3–14.5)
SODIUM SERPL-SCNC: 144 MMOL/L — SIGNIFICANT CHANGE UP (ref 135–145)
WBC # BLD: 2 K/UL — LOW (ref 3.8–10.5)
WBC # FLD AUTO: 2 K/UL — LOW (ref 3.8–10.5)

## 2018-02-17 PROCEDURE — 86431 RHEUMATOID FACTOR QUANT: CPT

## 2018-02-17 PROCEDURE — 80048 BASIC METABOLIC PNL TOTAL CA: CPT

## 2018-02-17 PROCEDURE — 84443 ASSAY THYROID STIM HORMONE: CPT

## 2018-02-17 PROCEDURE — 80074 ACUTE HEPATITIS PANEL: CPT

## 2018-02-17 PROCEDURE — 71046 X-RAY EXAM CHEST 2 VIEWS: CPT

## 2018-02-17 PROCEDURE — 82728 ASSAY OF FERRITIN: CPT

## 2018-02-17 PROCEDURE — 96374 THER/PROPH/DIAG INJ IV PUSH: CPT

## 2018-02-17 PROCEDURE — 86664 EPSTEIN-BARR NUCLEAR ANTIGEN: CPT

## 2018-02-17 PROCEDURE — 84155 ASSAY OF PROTEIN SERUM: CPT

## 2018-02-17 PROCEDURE — 87086 URINE CULTURE/COLONY COUNT: CPT

## 2018-02-17 PROCEDURE — 87798 DETECT AGENT NOS DNA AMP: CPT

## 2018-02-17 PROCEDURE — 84165 PROTEIN E-PHORESIS SERUM: CPT

## 2018-02-17 PROCEDURE — 87040 BLOOD CULTURE FOR BACTERIA: CPT

## 2018-02-17 PROCEDURE — 87389 HIV-1 AG W/HIV-1&-2 AB AG IA: CPT

## 2018-02-17 PROCEDURE — 85045 AUTOMATED RETICULOCYTE COUNT: CPT

## 2018-02-17 PROCEDURE — 87486 CHLMYD PNEUM DNA AMP PROBE: CPT

## 2018-02-17 PROCEDURE — 87581 M.PNEUMON DNA AMP PROBE: CPT

## 2018-02-17 PROCEDURE — 80061 LIPID PANEL: CPT

## 2018-02-17 PROCEDURE — 86160 COMPLEMENT ANTIGEN: CPT

## 2018-02-17 PROCEDURE — 85027 COMPLETE CBC AUTOMATED: CPT

## 2018-02-17 PROCEDURE — 86140 C-REACTIVE PROTEIN: CPT

## 2018-02-17 PROCEDURE — 87633 RESP VIRUS 12-25 TARGETS: CPT

## 2018-02-17 PROCEDURE — 85652 RBC SED RATE AUTOMATED: CPT

## 2018-02-17 PROCEDURE — 86038 ANTINUCLEAR ANTIBODIES: CPT

## 2018-02-17 PROCEDURE — 81001 URINALYSIS AUTO W/SCOPE: CPT

## 2018-02-17 PROCEDURE — 86665 EPSTEIN-BARR CAPSID VCA: CPT

## 2018-02-17 PROCEDURE — 87799 DETECT AGENT NOS DNA QUANT: CPT

## 2018-02-17 PROCEDURE — 84100 ASSAY OF PHOSPHORUS: CPT

## 2018-02-17 PROCEDURE — 80053 COMPREHEN METABOLIC PANEL: CPT

## 2018-02-17 PROCEDURE — 99285 EMERGENCY DEPT VISIT HI MDM: CPT | Mod: 25

## 2018-02-17 PROCEDURE — 81025 URINE PREGNANCY TEST: CPT

## 2018-02-17 PROCEDURE — 86645 CMV ANTIBODY IGM: CPT

## 2018-02-17 PROCEDURE — 86644 CMV ANTIBODY: CPT

## 2018-02-17 PROCEDURE — 83735 ASSAY OF MAGNESIUM: CPT

## 2018-02-17 PROCEDURE — 86663 EPSTEIN-BARR ANTIBODY: CPT

## 2018-02-17 PROCEDURE — 86334 IMMUNOFIX E-PHORESIS SERUM: CPT

## 2018-02-17 PROCEDURE — 82607 VITAMIN B-12: CPT

## 2018-02-17 PROCEDURE — 86225 DNA ANTIBODY NATIVE: CPT

## 2018-02-17 RX ORDER — HYDROXYCHLOROQUINE SULFATE 200 MG
2 TABLET ORAL
Qty: 60 | Refills: 0
Start: 2018-02-17 | End: 2018-03-18

## 2018-02-17 RX ADMIN — Medication 400 MILLIGRAM(S): at 06:04

## 2018-02-17 RX ADMIN — Medication 400 MILLIGRAM(S): at 00:40

## 2018-02-17 RX ADMIN — Medication 100 MILLIGRAM(S): at 13:00

## 2018-02-17 RX ADMIN — SODIUM CHLORIDE 120 MILLILITER(S): 9 INJECTION INTRAMUSCULAR; INTRAVENOUS; SUBCUTANEOUS at 06:05

## 2018-02-17 RX ADMIN — Medication 400 MILLIGRAM(S): at 14:33

## 2018-02-17 RX ADMIN — Medication 400 MILLIGRAM(S): at 12:58

## 2018-02-17 RX ADMIN — Medication 1 TABLET(S): at 12:58

## 2018-02-17 RX ADMIN — Medication 400 MILLIGRAM(S): at 13:29

## 2018-02-17 RX ADMIN — Medication 400 MILLIGRAM(S): at 05:59

## 2018-02-17 RX ADMIN — Medication 40 MILLIGRAM(S): at 06:01

## 2018-02-17 NOTE — PROGRESS NOTE ADULT - SUBJECTIVE AND OBJECTIVE BOX
22 years old female in our care for fevers. Pt is doing well, no overnight events, pt doesn't have any complains. Pt is afebrile, platelets levels are improving. Was seen by Heme/oncology yesterday, awaiting for SPEP (serum protein electrophoresis), ferritin, reticulocyte count results.     MEDS:  hydroxychloroquine 400 milliGRAM(s) Oral daily  trimethoprim  160 mG/sulfamethoxazole 800 mG 1 Tablet(s) Oral daily    No Known Allergies    VITALS:  Vital Signs Last 24 Hrs  T(C): 36.7 (17 Feb 2018 08:11), Max: 36.7 (17 Feb 2018 00:14)  T(F): 98 (17 Feb 2018 08:11), Max: 98.1 (17 Feb 2018 00:14)  HR: 62 (17 Feb 2018 08:11) (60 - 75)  BP: 112/57 (17 Feb 2018 08:11) (91/53 - 112/57)  BP(mean): --  RR: 16 (17 Feb 2018 08:11) (16 - 17)  SpO2: 100% (17 Feb 2018 08:11) (98% - 100%)    LABS/DIAGNOSTIC TESTS:                          9.0    2.0   )-----------( 138      ( 17 Feb 2018 06:12 )             28.7         02-16    145  |  114<H>  |  5<L>  ----------------------------<  81  4.2   |  26  |  0.52    Ca    7.3<L>      16 Feb 2018 07:45        CULTURES:   .Urine Clean Catch (Midstream)  02-13 @ 23:02   10,000 - 49,000 CFU/mL Coag negative Staphylococcus not Staph  saprophyticus  --  --      .Blood Blood  02-13 @ 22:58   No growth to date.  --  --

## 2018-02-17 NOTE — PROGRESS NOTE ADULT - ASSESSMENT
AUTO IMMUNE CYTOPENIAS:  The counts are low but not significantly.  They should improve with the treatment of SLE  Recommend heme onc followup in two months    Asked her to see us in the office, and office info was given    Nasir Gondal  [phone]

## 2018-02-17 NOTE — DISCHARGE NOTE ADULT - HOSPITAL COURSE
22yr old F from home, ambulates independtly with PMH of Systemic lupus erythematosus (diagnosed in Aug 2017, was started on steroids and hydroxychloroquine) came with c/o fever for 1 week.  Patient is admitted for concerns of lupus flare and neutropenic fever. Ua was positive. Patient was started on cefepime. ID was consulted, she was seen by Dr. Mckeon. BCx and UCx are negative. Patient fever resolved and Abx discontinued after 3 days. Patient is medically stable and will be discharged on steroid taper over 6 weeks with hydroxychloroquine. She is started on PCP prophylaxis (bactrim).     F/u with rheumatologist in 1-2 weeks  Comply with medications  Encourage hydration

## 2018-02-17 NOTE — DISCHARGE NOTE ADULT - MEDICATION SUMMARY - MEDICATIONS TO TAKE
I will START or STAY ON the medications listed below when I get home from the hospital:    predniSONE  -- 60 milligram(s) by mouth once a day x 7 days  50 mg daily x 7 days  40 mg daily x 7 days  30 mg daily x 7 days  20 mg daily x 7 days  10 mg daily x 7 days  -- Indication: For SLE flare    Benadryl 25 mg oral tablet  -- 1 tab(s) by mouth 3 times a day, As Needed - for itching  -- May cause drowsiness.  Alcohol may intensify this effect.  Use care when operating dangerous machinery.  Obtain medical advice before taking any non-prescription drugs as some may affect the action of this medication.    -- Indication: For allergy    hydroxychloroquine 200 mg oral tablet  -- 2 tab(s) by mouth once a day  -- Indication: For SLE (systemic lupus erythematosus)    polyethylene glycol 3350 oral powder for reconstitution  -- 17 gram(s) by mouth once a day  -- Indication: For Constipation    sulfamethoxazole-trimethoprim 800 mg-160 mg oral tablet  -- 1 tab(s) by mouth once a day  -- Indication: For PCP prophylaxis

## 2018-02-17 NOTE — DISCHARGE NOTE ADULT - PATIENT PORTAL LINK FT
You can access the [facility] Patient Portal, offered by [facility], by registering with the following website: [email or web address]

## 2018-02-17 NOTE — PROGRESS NOTE ADULT - PROBLEM SELECTOR PLAN 1
Looks like Lupus Flare in light of low C3 and C4.  Prednisone increased to 60 mg daily, taper down by 10 mg weekly, to continue on Bactrim DS for prophylaxis for the duration of the Prednisone.   F.u with Dr. Lanny Tovar Rheumatologist as outpatient on March first.

## 2018-02-17 NOTE — DISCHARGE NOTE ADULT - PLAN OF CARE
Prevent complications C/w steroid taper over 6 weeks. On hydroxychloroquine. C/w PCP prophylaxis with Bactrim

## 2018-02-17 NOTE — PROGRESS NOTE ADULT - GASTROINTESTINAL DETAILS
soft/no rigidity/no guarding/bowel sounds normal/nontender/no distention
no distention/soft/no rebound tenderness/nontender/no guarding/bowel sounds normal

## 2018-02-17 NOTE — DISCHARGE NOTE ADULT - CARE PLAN
Principal Discharge DX:	SLE (systemic lupus erythematosus)  Goal:	Prevent complications  Assessment and plan of treatment:	C/w steroid taper over 6 weeks. On hydroxychloroquine. C/w PCP prophylaxis with Bactrim  Secondary Diagnosis:	Pancytopenia  Secondary Diagnosis:	UTI (urinary tract infection)

## 2018-02-17 NOTE — PROGRESS NOTE ADULT - ASSESSMENT
fevers - normalized  pancytopenia - platelets improving    continue bactrim DS 1 tab po daily while on steroids (greater than 10 mgs) to prevent pcp pneumonia  plan to dc today on prednisone taper over 4 week period fevers - normalized  pancytopenia - platelets improving    continue bactrim DS 1 tab po daily while on steroids (greater than 10 mgs) to prevent pcp pneumonia  plan to dc today on prednisone taper over a 4- 6 week period

## 2018-02-17 NOTE — PROGRESS NOTE ADULT - SUBJECTIVE AND OBJECTIVE BOX
Patient is a 22y old  Female who presents with a chief complaint of Fever (13 Feb 2018 23:03)      INTERVAL HPI/OVERNIGHT EVENTS:  Patient was seen and examined at bedside, feeling ok  She was seen by Hem/onc yesterday, have appt with outpatient Rheum Dr. Tovar on March first  I explained to her at length how important to remain on Plaquenil as this will prevent flares of SLE, and  that prednisone will only be used during flares if they happen to control them.  I also explained that I need a CBC follow up which should be done in the next week either with hematology or her PCP.   and that she will be on prednisone for now with slow tapering.     She verbalized understanding.       Allergies    No Known Allergies    Intolerances    REVIEW OF SYSTEMS:  CONSTITUTIONAL: No fever, weight loss, or fatigue  EYES: No eye pain, visual disturbances, or discharge  RESPIRATORY: No cough, wheezing or shortness of breath  CARDIOVASCULAR: No chest pain, feeling of heart beats  GASTROINTESTINAL: No abdominal or epigastric pain. No nausea, vomiting; No diarrhea or constipation.  GENITOURINARY: No dysuria, frequency, hematuria  NEUROLOGICAL: No headaches  MUSCULOSKELETAL: No joint pain  PSYCHIATRIC: No depression or anxiety  HEME/LYMPH: No easy bruising, or bleeding gums  ALLERGY AND IMMUNOLOGIC: No hives or eczema    Medications:  docusate sodium 100 milliGRAM(s) Oral daily  hydroxychloroquine 400 milliGRAM(s) Oral daily  ibuprofen  Tablet 400 milliGRAM(s) Oral every 6 hours  polyethylene glycol 3350 17 Gram(s) Oral daily  predniSONE   Tablet 20 milliGRAM(s) Oral once  senna 1 Tablet(s) Oral at bedtime  sodium biphosphate Rectal Enema 1 Enema Rectal daily PRN Constipation  sodium chloride 0.9%. 1000 milliLiter(s) IV Continuous <Continuous>  trimethoprim  160 mG/sulfamethoxazole 800 mG 1 Tablet(s) Oral daily      PHYSICAL EXAM:  Vital Signs Last 24 Hrs  T(C): 36.7 (17 Feb 2018 08:11), Max: 36.7 (17 Feb 2018 00:14)  T(F): 98 (17 Feb 2018 08:11), Max: 98.1 (17 Feb 2018 00:14)  HR: 62 (17 Feb 2018 08:11) (60 - 75)  BP: 112/57 (17 Feb 2018 08:11) (91/53 - 112/57)  BP(mean): --  RR: 16 (17 Feb 2018 08:11) (16 - 17)  SpO2: 100% (17 Feb 2018 08:11) (98% - 100%)    GENERAL: NAD  HEAD:  Atraumatic, Normocephalic  EYES: EOMI, PERRLA, conjunctiva and sclera clear  ENT: moist mucous membranes  NECK: Supple, No JVD, Normal thyroid  NERVOUS SYSTEM:  Alert & Oriented X3, Good concentration; Motor Strength 5/5 B/L upper and lower extremities; DTRs 2+ intact and symmetric  CHEST/LUNG: No rales, rhonchi, wheezing, or rubs  HEART: Regular rate and rhythm; No murmurs, rubs, or gallops  ABDOMEN: Soft, Nontender, Nondistended; Bowel sounds present  EXTREMITIES:  2+ Peripheral Pulses, No clubbing, cyanosis, or edema  SKIN: No rashes or lesions    LABS:                        9.0    2.0   )-----------( 138      ( 17 Feb 2018 06:12 )             28.7     02-17    144  |  112<H>  |  6<L>  ----------------------------<  101<H>  4.0   |  26  |  0.51    Ca    7.4<L>      17 Feb 2018 06:12  Phos  3.4     02-17  Mg     2.2     02-17    TPro  6.6  /  Alb  x   /  TBili  x   /  DBili  x   /  AST  x   /  ALT  x   /  AlkPhos  x   02-17    LIVER FUNCTIONS - ( 17 Feb 2018 10:24 )  Alb: x     / Pro: 6.6 g/dL / ALK PHOS: x     / ALT: x     / AST: x     / GGT: x                     Culture & Sensitivity:   CAPILLARY BLOOD GLUCOSE            RADIOLOGY & ADDITIONAL TESTS:  Radiology testing independently reviewed:     Consultant(s) Notes Reviewed:  [ x] YES  [ ] NO    Care Discussed with Consultants/Other Providers [ x] YES  [ ] NO

## 2018-02-17 NOTE — PROGRESS NOTE ADULT - SUBJECTIVE AND OBJECTIVE BOX
Patient is a 22y old  Female who presents with a chief complaint of Fever (13 Feb 2018 23:03)    Pancytopenia   was diagnosed with SLE last year but not on plaquanil until yesterday  on steoids    Now on bactrim  in the process of going home            MEDICATIONS  (STANDING):  docusate sodium 100 milliGRAM(s) Oral daily  hydroxychloroquine 400 milliGRAM(s) Oral daily  ibuprofen  Tablet 400 milliGRAM(s) Oral every 6 hours  polyethylene glycol 3350 17 Gram(s) Oral daily  predniSONE   Tablet 20 milliGRAM(s) Oral once  senna 1 Tablet(s) Oral at bedtime  sodium chloride 0.9%. 1000 milliLiter(s) (120 mL/Hr) IV Continuous <Continuous>  trimethoprim  160 mG/sulfamethoxazole 800 mG 1 Tablet(s) Oral daily    MEDICATIONS  (PRN):  sodium biphosphate Rectal Enema 1 Enema Rectal daily PRN Constipation      Allergies    No Known Allergies    Intolerances        Vital Signs Last 24 Hrs  T(C): 36.7 (17 Feb 2018 08:11), Max: 36.7 (17 Feb 2018 00:14)  T(F): 98 (17 Feb 2018 08:11), Max: 98.1 (17 Feb 2018 00:14)  HR: 62 (17 Feb 2018 08:11) (60 - 75)  BP: 112/57 (17 Feb 2018 08:11) (91/53 - 112/57)  BP(mean): --  RR: 16 (17 Feb 2018 08:11) (16 - 17)  SpO2: 100% (17 Feb 2018 08:11) (98% - 100%)    PHYSICAL EXAM  General: adult in NAD  rest deferred      LABS:                          9.0    2.0   )-----------( 138      ( 17 Feb 2018 06:12 )             28.7         Mean Cell Volume : 93.1 fl  Mean Cell Hemoglobin : 29.1 pg  Mean Cell Hemoglobin Concentration : 31.2 gm/dL  Auto Neutrophil # : 1.0 K/uL  Auto Lymphocyte # : 0.8 K/uL  Auto Monocyte # : 0.1 K/uL  Auto Eosinophil # : 0.0 K/uL  Auto Basophil # : 0.0 K/uL  Auto Neutrophil % : 51.3 %  Auto Lymphocyte % : 40.4 %  Auto Monocyte % : 7.5 %  Auto Eosinophil % : 0.0 %  Auto Basophil % : 0.8 %      Serial CBC's  02-17 @ 06:12  Hct-28.7 / Hgb-9.0 / Plat-138 / RBC-3.08 / WBC-2.0  Serial CBC's  02-16 @ 12:30  Hct-27.0 / Hgb-8.4 / Plat-113 / RBC-2.89 / WBC-2.1  Serial CBC's  02-16 @ 07:45  Hct-26.2 / Hgb-8.5 / Plat-98 / RBC-2.87 / WBC-2.6  Serial CBC's  02-15 @ 07:14  Hct-27.0 / Hgb-8.1 / Plat-97 / RBC-2.96 / WBC-2.2  Serial CBC's  02-14 @ 07:11  Hct-26.8 / Hgb-8.5 / Plat-84 / RBC-2.91 / WBC-2.1  Serial CBC's  02-13 @ 17:31  Hct-31.6 / Hgb-9.4 / Plat-85 / RBC-3.42 / WBC-2.9      02-17    144  |  112<H>  |  6<L>  ----------------------------<  101<H>  4.0   |  26  |  0.51    Ca    7.4<L>      17 Feb 2018 06:12  Phos  3.4     02-17  Mg     2.2     02-17    TPro  6.6  /  Alb  x   /  TBili  x   /  DBili  x   /  AST  x   /  ALT  x   /  AlkPhos  x   02-17          Vitamin B12, Serum: 503 pg/mL (02-14 @ 09:31)

## 2018-02-18 LAB
CULTURE RESULTS: SIGNIFICANT CHANGE UP
CULTURE RESULTS: SIGNIFICANT CHANGE UP
RBC # BLD: 3.16 M/UL — LOW (ref 3.8–5.2)
RETICS #: 16.4 K/UL — LOW (ref 25–125)
RETICS/RBC NFR: 0.5 % — SIGNIFICANT CHANGE UP (ref 0.5–2.5)
SPECIMEN SOURCE: SIGNIFICANT CHANGE UP
SPECIMEN SOURCE: SIGNIFICANT CHANGE UP

## 2018-02-20 LAB
% ALBUMIN: 35.8 % — SIGNIFICANT CHANGE UP
% ALPHA 1: 5.2 % — SIGNIFICANT CHANGE UP
% ALPHA 2: 10.1 % — SIGNIFICANT CHANGE UP
% BETA: 10.7 % — SIGNIFICANT CHANGE UP
% GAMMA: 38.2 % — SIGNIFICANT CHANGE UP
ALBUMIN SERPL ELPH-MCNC: 2.4 G/DL — LOW (ref 3.6–5.5)
ALBUMIN/GLOB SERPL ELPH: 0.6 RATIO — SIGNIFICANT CHANGE UP
ALPHA1 GLOB SERPL ELPH-MCNC: 0.3 G/DL — SIGNIFICANT CHANGE UP (ref 0.1–0.4)
ALPHA2 GLOB SERPL ELPH-MCNC: 0.7 G/DL — SIGNIFICANT CHANGE UP (ref 0.5–1)
B-GLOBULIN SERPL ELPH-MCNC: 0.7 G/DL — SIGNIFICANT CHANGE UP (ref 0.5–1)
GAMMA GLOBULIN: 2.5 G/DL — HIGH (ref 0.6–1.6)
INTERPRETATION SERPL IFE-IMP: SIGNIFICANT CHANGE UP
PROT PATTERN SERPL ELPH-IMP: SIGNIFICANT CHANGE UP

## 2018-02-21 LAB
B19V DNA FLD QL NAA+PROBE: SIGNIFICANT CHANGE UP

## 2018-04-05 ENCOUNTER — OUTPATIENT (OUTPATIENT)
Dept: OUTPATIENT SERVICES | Facility: HOSPITAL | Age: 23
LOS: 1 days | Discharge: HOME | End: 2018-04-05

## 2018-04-05 DIAGNOSIS — D61.818 OTHER PANCYTOPENIA: ICD-10-CM

## 2018-04-10 ENCOUNTER — APPOINTMENT (OUTPATIENT)
Dept: HEMATOLOGY ONCOLOGY | Facility: CLINIC | Age: 23
End: 2018-04-10

## 2018-12-18 ENCOUNTER — EMERGENCY (EMERGENCY)
Facility: HOSPITAL | Age: 23
LOS: 1 days | Discharge: HOME | End: 2018-12-18
Attending: EMERGENCY MEDICINE
Payer: COMMERCIAL

## 2018-12-18 VITALS
WEIGHT: 119.93 LBS | RESPIRATION RATE: 18 BRPM | HEIGHT: 65 IN | DIASTOLIC BLOOD PRESSURE: 77 MMHG | SYSTOLIC BLOOD PRESSURE: 110 MMHG | TEMPERATURE: 98 F | HEART RATE: 106 BPM | OXYGEN SATURATION: 100 %

## 2018-12-18 VITALS
OXYGEN SATURATION: 99 % | SYSTOLIC BLOOD PRESSURE: 104 MMHG | HEART RATE: 78 BPM | DIASTOLIC BLOOD PRESSURE: 75 MMHG | TEMPERATURE: 98 F | RESPIRATION RATE: 17 BRPM

## 2018-12-18 PROCEDURE — 99283 EMERGENCY DEPT VISIT LOW MDM: CPT

## 2018-12-18 RX ORDER — METOCLOPRAMIDE HCL 10 MG
10 TABLET ORAL ONCE
Qty: 0 | Refills: 0 | Status: COMPLETED | OUTPATIENT
Start: 2018-12-18 | End: 2018-12-18

## 2018-12-18 RX ADMIN — Medication 2 TABLET(S): at 12:10

## 2018-12-18 RX ADMIN — Medication 10 MILLIGRAM(S): at 12:09

## 2018-12-18 RX ADMIN — Medication 2 TABLET(S): at 12:56

## 2018-12-18 RX ADMIN — Medication 40 MILLIGRAM(S): at 12:09

## 2018-12-18 NOTE — ED ADULT NURSE REASSESSMENT NOTE - NS ED NURSE REASSESS COMMENT FT1
Pt reports relief in migraine since medication administered as per MD order, 3/10 headache pain. Pt denies blurry vision and dizziness at this time." Pt requesting food, MD Horn states pt can have food/drink. Pt provided crackers and water. Awaiting disposition.

## 2018-12-18 NOTE — ED ADULT NURSE NOTE - OBJECTIVE STATEMENT
Pt is a 23 year old female with hx of lupus on Plaquenil presenting to the ED with a 5/10 intermittent "throbbing" migraine to forehead, blurry vision and mild dizziness with "black spots" and b/l knee pain since yesterday. Pt states "I think I am having a lupus flare up." Pt states "this happened to me a year ago and I was admitted." Pt states she goes to her rheumatologist every 1-3 months for blood work and a check up, last appointment October 12th. Pt states "I talked to my rheumatologist and he told me to come to the ED to get worked up." Pt states "it doesn't feel like I'm going to pass out." As per pt, she has been walking with a steady gait. Pt denies syncope, chest pain, shortness of breath, N/V/D, recent fevers at home or sickness. Pt is breathing unlabored on room air. PERRL, 4mm. No facial droop. Pt is able to move upper and lower extremities, sensation intact. Strong  strength b/l. Abdomen is soft and non-tender. Skin is warm, dry, and of appropriate color to ethnicity. Safety and comfort maintained, call bell within reach.

## 2018-12-18 NOTE — ED PROVIDER NOTE - OBJECTIVE STATEMENT
24 y/o F w pmhx of Lupus currently on Plaquenil 200 mg twice a day p/w blurry vision associated w right sided h/a, dizziness and b/l knee/joint pain since yesterday. H/A persisting currently, described as sharp ache at 8/10 severity with blurred vision presenting intermittently, (none currently). Pt has had h/a in the past and that was when she was diagnosed with Lupus (2017). Thinks her Lupus might be 'flaring up'. Pt sees her rheumatologist monthly, called him this AM and was directed to come to ED as he wanted to be sure BP was normal.   Endorsed Tylenol and Motrin intermittently in the past w no relief. Did not endorse any pain meds today. Pt was otherwise feeling well the past few weeks. Denies numbness, tingling, CP, SOB, N/V/D, rashes. Denies recent fevers or illnesses.  Rheumatologist: Dr. Pacheco [phone] 24 y/o F w pmhx of Lupus currently on Plaquenil 200 mg twice a day p/w blurry vision associated w right sided h/a  and b/l knee/joint pain since yesterday. H/A persisting currently, described as sharp ache at 8/10 severity with blurred vision presenting intermittently, (none currently). Pt has had h/a in the past and that was when she was diagnosed with Lupus (2017). Thinks her Lupus might be 'flaring up'. Pt sees her rheumatologist monthly, called him this AM and was directed to come to ED as he wanted to be sure BP was normal.   Endorsed Tylenol and Motrin intermittently in the past w no relief. Did not endorse any pain meds today. Pt was otherwise feeling well the past few weeks. Denies numbness, tingling, CP, SOB, N/V/D, rashes. Denies recent fevers or illnesses.  Rheumatologist: Dr. Pacheco [phone] 22 y/o F w pmhx of Lupus currently on Plaquenil 200 mg twice a day p/w blurry vision associated w right sided h/a  and b/l knee/joint pain since yesterday. H/A persisting currently, described as sharp ache at 8/10 severity with blurred vision presenting intermittently, (none currently). Pt has had h/a in the past at which time she was diagnosed with Lupus (2017). Thinks her Lupus might be 'flaring up'. Pt sees her rheumatologist monthly, called him this AM and was directed to come to ED as he wanted to be sure BP was normal.   Endorsed Tylenol and Motrin intermittently in the past w no relief. Did not endorse any pain meds today. Pt was otherwise feeling well the past few weeks. Denies numbness, tingling, CP, SOB, N/V/D, rashes. Denies recent fevers or illnesses.  Rheumatologist: Dr. Pacheco [phone]

## 2018-12-18 NOTE — ED ADULT NURSE REASSESSMENT NOTE - NS ED NURSE REASSESS COMMENT FT1
Esig medication not available in pyxis. Spoke with pharmacist Juan who states he will send medication to PEDS area of ED via tube system. Awaiting medication arrival.

## 2018-12-18 NOTE — ED ADULT NURSE NOTE - CHPI ED NUR SYMPTOMS NEG
no vomiting/no fever/no confusion/no loss of consciousness/no nausea/no change in level of consciousness

## 2018-12-18 NOTE — ED ADULT TRIAGE NOTE - CHIEF COMPLAINT QUOTE
pt. c/o migraine associated with feelings of blurred vision and bilateral knee/joint pain since yesterday. pt has hx of lupus, currently on Plaquenil, verbalizes "I think im having a flare up", called her rheumatologist this am in regards to her s/s and was directed to come to ED for further testing and work up.

## 2018-12-18 NOTE — ED ADULT NURSE NOTE - NSIMPLEMENTINTERV_GEN_ALL_ED
Implemented All Universal Safety Interventions:  [place] to call system. Call bell, personal items and telephone within reach. Instruct patient to call for assistance. Room bathroom lighting operational. Non-slip footwear when patient is off stretcher. Physically safe environment: no spills, clutter or unnecessary equipment. Stretcher in lowest position, wheels locked, appropriate side rails in place.

## 2018-12-18 NOTE — ED PROVIDER NOTE - PROGRESS NOTE DETAILS
Pt. much improved, headache is gone. Feels better and wants to go home. Will DC with neuro follow up.

## 2018-12-19 PROBLEM — M32.9 SYSTEMIC LUPUS ERYTHEMATOSUS, UNSPECIFIED: Chronic | Status: ACTIVE | Noted: 2018-02-14

## 2019-04-11 ENCOUNTER — TRANSCRIPTION ENCOUNTER (OUTPATIENT)
Age: 24
End: 2019-04-11

## 2019-04-25 ENCOUNTER — LABORATORY RESULT (OUTPATIENT)
Age: 24
End: 2019-04-25

## 2019-04-25 ENCOUNTER — APPOINTMENT (OUTPATIENT)
Dept: PEDIATRIC ALLERGY IMMUNOLOGY | Facility: CLINIC | Age: 24
End: 2019-04-25
Payer: COMMERCIAL

## 2019-04-25 VITALS — DIASTOLIC BLOOD PRESSURE: 71 MMHG | WEIGHT: 116.8 LBS | SYSTOLIC BLOOD PRESSURE: 103 MMHG | HEART RATE: 91 BPM

## 2019-04-25 DIAGNOSIS — Z84.0 FAMILY HISTORY OF DISEASES OF THE SKIN AND SUBCUTANEOUS TISSUE: ICD-10-CM

## 2019-04-25 DIAGNOSIS — Z13.228 ENCOUNTER FOR SCREENING FOR OTHER SUSPECTED ENDOCRINE DISORDER: ICD-10-CM

## 2019-04-25 DIAGNOSIS — Z86.19 PERSONAL HISTORY OF OTHER INFECTIOUS AND PARASITIC DISEASES: ICD-10-CM

## 2019-04-25 DIAGNOSIS — J31.0 CHRONIC RHINITIS: ICD-10-CM

## 2019-04-25 DIAGNOSIS — Z80.3 FAMILY HISTORY OF MALIGNANT NEOPLASM OF BREAST: ICD-10-CM

## 2019-04-25 DIAGNOSIS — Z13.0 ENCOUNTER FOR SCREENING FOR OTHER SUSPECTED ENDOCRINE DISORDER: ICD-10-CM

## 2019-04-25 DIAGNOSIS — Z13.29 ENCOUNTER FOR SCREENING FOR OTHER SUSPECTED ENDOCRINE DISORDER: ICD-10-CM

## 2019-04-25 DIAGNOSIS — T78.1XXA OTHER ADVERSE FOOD REACTIONS, NOT ELSEWHERE CLASSIFIED, INITIAL ENCOUNTER: ICD-10-CM

## 2019-04-25 PROCEDURE — 99205 OFFICE O/P NEW HI 60 MIN: CPT | Mod: 25,GC

## 2019-04-25 PROCEDURE — 36415 COLL VENOUS BLD VENIPUNCTURE: CPT | Mod: GC

## 2019-04-26 LAB
MEV IGG FLD QL IA: >300 AU/ML
MEV IGG+IGM SER-IMP: POSITIVE
MUV AB SER-ACNC: POSITIVE
MUV IGG SER QL IA: 137 AU/ML
RUBV IGG FLD-ACNC: 30.7 INDEX
RUBV IGG SER-IMP: POSITIVE
VZV AB TITR SER: POSITIVE
VZV IGG SER IF-ACNC: 654.6 INDEX

## 2019-04-26 NOTE — REVIEW OF SYSTEMS
[Fever] : fever [Wgt Loss (___ Lbs)] : recent [unfilled] lb weight loss [Abdominal Pain] : abdominal pain [Atopic Dermatitis] : atopic dermatitis [Joint Swelling] : joint swelling  [Joint Pains] : arthralgias [Nl] : Endocrine

## 2019-04-29 LAB
ALBUMIN MFR SERPL ELPH: 36.9 %
ALBUMIN SERPL-MCNC: 3.1 G/DL
ALBUMIN/GLOB SERPL: 0.6 RATIO
ALPHA1 GLOB MFR SERPL ELPH: 4.6 %
ALPHA1 GLOB SERPL ELPH-MCNC: 0.4 G/DL
ALPHA2 GLOB MFR SERPL ELPH: 8.4 %
ALPHA2 GLOB SERPL ELPH-MCNC: 0.7 G/DL
B-GLOBULIN MFR SERPL ELPH: 12.1 %
B-GLOBULIN SERPL ELPH-MCNC: 1 G/DL
BASOPHILS # BLD AUTO: 0.01 K/UL
BASOPHILS NFR BLD AUTO: 0.3 %
CD16+CD56+ CELLS # BLD: 32 /UL
CD16+CD56+ CELLS NFR BLD: 3 %
CD19 CELLS NFR BLD: 175 /UL
CD3 CELLS # BLD: 748 /UL
CD3 CELLS NFR BLD: 78 %
CD3+CD4+ CELLS # BLD: 336 /UL
CD3+CD4+ CELLS NFR BLD: 35 %
CD3+CD4+ CELLS/CD3+CD8+ CLL SPEC: 0.86 RATIO
CD3+CD8+ CELLS # SPEC: 392 /UL
CD3+CD8+ CELLS NFR BLD: 41 %
CELLS.CD3-CD19+/CELLS IN BLOOD: 18 %
DEPRECATED KAPPA LC FREE/LAMBDA SER: 1.76 RATIO
EOSINOPHIL # BLD AUTO: 0.05 K/UL
EOSINOPHIL NFR BLD AUTO: 1.3 %
G6PD SER-CCNC: 18.1 U/G HGB
GAMMA GLOB FLD ELPH-MCNC: 3.2 G/DL
GAMMA GLOB MFR SERPL ELPH: 38 %
HCT VFR BLD CALC: 33.8 %
HGB BLD-MCNC: 10.1 G/DL
IGA SER QL IEP: 582 MG/DL
IGG SER QL IEP: 3215 MG/DL
IGM SER QL IEP: 111 MG/DL
IMM GRANULOCYTES NFR BLD AUTO: 0.3 %
INTERPRETATION SERPL IEP-IMP: NORMAL
KAPPA LC CSF-MCNC: 6.89 MG/DL
KAPPA LC SERPL-MCNC: 12.1 MG/DL
LYMPHOCYTES # BLD AUTO: 1.1 K/UL
LYMPHOCYTES NFR BLD AUTO: 28.6 %
M PROTEIN SPEC IFE-MCNC: NORMAL
MAN DIFF?: NORMAL
MCHC RBC-ENTMCNC: 27.7 PG
MCHC RBC-ENTMCNC: 29.9 GM/DL
MCV RBC AUTO: 92.6 FL
MONOCYTES # BLD AUTO: 0.18 K/UL
MONOCYTES NFR BLD AUTO: 4.7 %
NEUTROPHILS # BLD AUTO: 2.49 K/UL
NEUTROPHILS NFR BLD AUTO: 64.8 %
PLATELET # BLD AUTO: 161 K/UL
PROT SERPL-MCNC: 8.4 G/DL
PROT SERPL-MCNC: 8.4 G/DL
RBC # BLD: 3.65 M/UL
RBC # FLD: 14.4 %
WBC # FLD AUTO: 3.84 K/UL

## 2019-04-30 LAB
IGG SUBSET TOTAL IGG: 3380 MG/DL
IGG1 SER-MCNC: 2470 MG/DL
IGG2 SER-MCNC: 366 MG/DL
IGG3 SER-MCNC: >221 MG/DL
IGG4 SER-MCNC: 39 MG/DL

## 2019-05-01 LAB
C DIPHTHERIAE AB SER QL: 0.4 IU/ML
C TETANI IGG SER-ACNC: 1.35 IU/ML
MYELOPEROXIDASE SPEC: POSITIVE

## 2019-05-01 NOTE — CONSULT LETTER
[Dear  ___] : Dear  [unfilled], [Consult Letter:] : I had the pleasure of evaluating your patient, [unfilled]. [Consult Closing:] : Thank you very much for allowing me to participate in the care of this patient.  If you have any questions, please do not hesitate to contact me. [Please see my note below.] : Please see my note below. [Sincerely,] : Sincerely, [FreeTextEntry3] : Cait Dupont MD\par Fellow, Division of Allergy/Immunology\par [facility]\par \par Chalo Rhodes III  MPH, MD, PhD, FACP, FACAAI, FAAAAI \par , Departments of Medicine and Pediatrics \par [facility] \par , [facility] \par Attending Physician, Division of Allergy & Immunology [facility]\par \par \par

## 2019-05-01 NOTE — HISTORY OF PRESENT ILLNESS
[Asthma] : asthma [de-identified] : 23-year-old female with SLE presenting for immune evaluation due to concerns of recurrent infections causing SLE flares. \par \par Patient was diagnosed with SLE in 2017 after joint swelling, joint pains and intermittent rashes. She is currently followed by Dr Kushal Townsend. She has received multiple courses of systemic steroids for her flares and was on Plaquenil, which is now discontinued. 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 With most of her flares, she is also treated for a suspected infection. She has never had a positive culture or viral testing to her knowledge.\par \par She reports frequent vaginal yeast infections. Not sure if these are after receiving antibiotics. She hasn't had any fungal oral or nail infections. She denies history of sinus infections or pneumonias. No infections requiring hospitalization or IV antibiotics, aside for the suspected infections during her SLE flares.\par \par Adverse food reactions - Recently has itching of mouth after kiwi and pineapple ingestion, worse after kiwis. No hives, angioedema, vomiting, diarrhea or respiratory symptoms. \par \par Denies nasal congestion. But has intermittent nasal discharge out of the blue, which doesn't last more than a few minutes. She has been seen by an allergist in 2017 and had negative skin testing. \par \par Eczema - Has history of eczema as a young child. Has not been active in many years.\par \par Family history:\par - no autoimmunity.\par - breast cancer in great grandmother, a GI cancer in great uncle, no cancers in immediate family members.\par - mother with one miscarriage and subsequent healthy pregnancies. No stillbirth, early child death.\par - no immunodeficiency, frequent infections.

## 2019-05-01 NOTE — PHYSICAL EXAM
[Alert] : alert [No Acute Distress] : no acute distress [Well Nourished] : well nourished [Healthy Appearance] : healthy appearance [Well Developed] : well developed [Normal Pupil & Iris Size/Symmetry] : normal pupil and iris size and symmetry [No Photophobia] : no photophobia [No Discharge] : no discharge [Sclera Not Icteric] : sclera not icteric [Normal TMs] : both tympanic membranes were normal [Normal Lips/Tongue] : the lips and tongue were normal [Normal Nasal Mucosa] : the nasal mucosa was normal [No Nasal Discharge] : no nasal discharge [Normal Outer Ear/Nose] : the ears and nose were normal in appearance [Normal Tonsils] : normal tonsils [Normal Dentition] : normal dentition [No Thrush] : no thrush [No Oral Lesions or Ulcers] : no oral lesions or ulcers [Supple] : the neck was supple [Normal Rate and Effort] : normal respiratory rhythm and effort [Bilateral Audible Breath Sounds] : bilateral audible breath sounds [No Retractions] : no retractions [No Crackles] : no crackles [Normal Rate] : heart rate was normal  [No murmur] : no murmur [Normal S1, S2] : normal S1 and S2 [Soft] : abdomen soft [Not Tender] : non-tender [Regular Rhythm] : with a regular rhythm [No HSM] : no hepato-splenomegaly [Not Distended] : not distended [Normal Cervical Lymph Nodes] : cervical [Skin Intact] : skin intact  [No Rash] : no rash [No Skin Lesions] : no skin lesions [No Joint Swelling or Erythema] : no joint swelling or erythema [No clubbing] : no clubbing [No Cyanosis] : no cyanosis [Cranial Nerves Intact] : cranial nerves 2-12 were intact [No Edema] : no edema [Normal Mood] : mood was normal [No Motor Deficits] : the motor exam was normal [Normal Affect] : affect was normal [Alert, Awake, Oriented as Age-Appropriate] : alert, awake, oriented as age appropriate [Conjunctival Erythema] : no conjunctival erythema [Suborbital Bogginess] : no suborbital bogginess (allergic shiners) [Boggy Nasal Turbinates] : no boggy and/or pale nasal turbinates [Exudate] : no exudate [Posterior Pharyngeal Cobblestoning] : no posterior pharyngeal cobblestoning [Wheezing] : no wheezing was heard [Eczematous Patches] : no eczematous patches [Xerosis] : no xerosis

## 2019-05-02 LAB
A ALTERNATA IGE QN: <0.1 KUA/L
A FUMIGATUS IGE QN: <0.1 KUA/L
A NIGER IGE QN: <0.1 KUA/L
AMER BEECH IGE QN: 0
BOXELDER IGE QN: <0.1 KUA/L
C HERBARUM IGE QN: <0.1 KUA/L
C LUNATA IGE QN: <0.1 KUA/L
CALIF WALNUT IGE QN: <0.1 KUA/L
CAT DANDER IGE QN: <0.1 KUA/L
CMN PIGWEED IGE QN: <0.1 KUA/L
COCKLEBUR IGE QN: <0.1 KUA/L
COCKSFOOT IGE QN: <0.1 KUA/L
COMMON RAGWEED IGE QN: <0.1 KUA/L
D FARINAE IGE QN: <0.1 KUA/L
D PTERONYSS IGE QN: <0.1 KUA/L
DEPRECATED A ALTERNATA IGE RAST QL: 0
DEPRECATED A FUMIGATUS IGE RAST QL: 0
DEPRECATED A NIGER IGE RAST QL: 0
DEPRECATED A PULLULANS IGE RAST QL: 0
DEPRECATED AMER BEECH IGE RAST QL: <0.1 KUA/L
DEPRECATED BOXELDER IGE RAST QL: 0
DEPRECATED C HERBARUM IGE RAST QL: 0
DEPRECATED C LUNATA IGE RAST QL: 0
DEPRECATED CAT DANDER IGE RAST QL: 0
DEPRECATED COCKLEBUR IGE RAST QL: 0
DEPRECATED COCKSFOOT IGE RAST QL: 0
DEPRECATED COMMON PIGWEED IGE RAST QL: 0
DEPRECATED COMMON RAGWEED IGE RAST QL: 0
DEPRECATED D FARINAE IGE RAST QL: 0
DEPRECATED D PTERONYSS IGE RAST QL: 0
DEPRECATED DOG DANDER IGE RAST QL: 0
DEPRECATED ENGL PLANTAIN IGE RAST QL: 0
DEPRECATED F MONILIFORME IGE RAST QL: 0
DEPRECATED GIANT RAGWEED IGE RAST QL: 0
DEPRECATED GOOSE FEATHER IGE RAST QL: 0
DEPRECATED GOOSEFOOT IGE RAST QL: 0
DEPRECATED HORSE DANDER IGE RAST QL: 0
DEPRECATED JOHNSON GRASS IGE RAST QL: 0
DEPRECATED KENT BLUE GRASS IGE RAST QL: 0
DEPRECATED KIWIFRUIT IGE RAST QL: <0.1 KUA/L
DEPRECATED LONDON PLANE IGE RAST QL: 0
DEPRECATED M RACEMOSUS IGE RAST QL: 0
DEPRECATED MUGWORT IGE RAST QL: 0
DEPRECATED P NOTATUM IGE RAST QL: 0
DEPRECATED PINEAPPLE IGE RAST QL: 0
DEPRECATED R NIGRICANS IGE RAST QL: 0
DEPRECATED RABBIT MEAT IGE RAST QL: 0
DEPRECATED RED CEDAR IGE RAST QL: 0
DEPRECATED RED TOP GRASS IGE RAST QL: 0
DEPRECATED ROACH IGE RAST QL: 0
DEPRECATED SILVER BIRCH IGE RAST QL: 0
DEPRECATED TIMOTHY IGE RAST QL: 0
DEPRECATED WHITE ASH IGE RAST QL: 0
DEPRECATED WHITE HICKORY IGE RAST QL: 0
DEPRECATED WHITE OAK IGE RAST QL: 0
DOG DANDER IGE QN: <0.1 KUA/L
ENGL PLANTAIN IGE QN: <0.1 KUA/L
F MONILIFORME IGE QN: <0.1 KUA/L
GIANT RAGWEED IGE QN: <0.1 KUA/L
GOOSE FEATHER IGE QN: <0.1 KUA/L
GOOSEFOOT IGE QN: <0.1 KUA/L
GRAY ALDER (T2) CLASS: 0
GRAY ALDER (T2) CONC: <0.1 KUA/L
HAMSTER EPITHELIUM (E84) CLASS: 0
HAMSTER EPITHELIUM (E84) CONC: <0.1 KUA/L
HORSE DANDER IGE QN: <0.1 KUA/L
JOHNSON GRASS IGE QN: <0.1 KUA/L
KENT BLUE GRASS IGE QN: <0.1 KUA/L
KIWIFRUIT IGE QN: 0
LONDON PLANE IGE QN: <0.1 KUA/L
M RACEMOSUS IGE QN: <0.1 KUA/L
MOLD (AUREOBASIDIUM M12) CONC: <0.1 KUA/L
MOUSE EPITHELIUM (E71) CLASS: 0
MOUSE EPITHELIUM (E71) CONC: <0.1 KUA/L
MUGWORT IGE QN: <0.1 KUA/L
MULBERRY (T70) CLASS: 0
MULBERRY (T70) CONC: <0.1 KUA/L
P NOTATUM IGE QN: <0.1 KUA/L
PINEAPPLE IGE QN: <0.1 KUA/L
R NIGRICANS IGE QN: <0.1 KUA/L
RABBIT MEAT IGE QN: <0.1 KUA/L
RED CEDAR IGE QN: <0.1 KUA/L
RED TOP GRASS IGE QN: <0.1 KUA/L
ROACH IGE QN: <0.1 KUA/L
SILVER BIRCH IGE QN: <0.1 KUA/L
TIMOTHY IGE QN: <0.1 KUA/L
TREE ALLERG MIX1 IGE QL: 0
WHITE ASH IGE QN: <0.1 KUA/L
WHITE ELM IGE QN: 0
WHITE ELM IGE QN: <0.1 KUA/L
WHITE HICKORY IGE QN: <0.1 KUA/L
WHITE OAK IGE QN: <0.1 KUA/L

## 2019-05-20 LAB
DEPRECATED S PNEUM 1 IGG SER-MCNC: 9.7 MCG/ML
DEPRECATED S PNEUM12 AB SER-ACNC: <0.4 MCG/ML
DEPRECATED S PNEUM14 AB SER-ACNC: 17.2 MCG/ML
DEPRECATED S PNEUM17 IGG SER IA-MCNC: 5.7 MCG/ML
DEPRECATED S PNEUM18 IGG SER IA-MCNC: 1.1 MCG/ML
DEPRECATED S PNEUM19 IGG SER-MCNC: 7.1 MCG/ML
DEPRECATED S PNEUM19 IGG SER-MCNC: 7.1 MCG/ML
DEPRECATED S PNEUM2 IGG SER-MCNC: 1 MCG/ML
DEPRECATED S PNEUM20 IGG SER-MCNC: 0.8 MCG/ML
DEPRECATED S PNEUM22 IGG SER-MCNC: 4.7 MCG/ML
DEPRECATED S PNEUM23 AB SER-ACNC: 6.4 MCG/ML
DEPRECATED S PNEUM3 AB SER-ACNC: 1.2 MCG/ML
DEPRECATED S PNEUM34 IGG SER-MCNC: 2.1 MCG/ML
DEPRECATED S PNEUM4 AB SER-ACNC: 2.9 MCG/ML
DEPRECATED S PNEUM5 IGG SER-MCNC: 1.1 MCG/ML
DEPRECATED S PNEUM6 IGG SER-MCNC: 22.4 MCG/ML
DEPRECATED S PNEUM7 IGG SER-ACNC: 2.7 MCG/ML
DEPRECATED S PNEUM8 AB SER-ACNC: 1.4 MCG/ML
DEPRECATED S PNEUM9 AB SER-ACNC: 1.1 MCG/ML
DEPRECATED S PNEUM9 IGG SER-MCNC: 10.3 MCG/ML
HAEM INFLU B AB SER-MCNC: 3.45 MG/L
LPT PW BLD-NRATE: ABNORMAL
STREPTOCOCCUS PNEUMONIAE SEROTYPE 11A: <0.4 MCG/ML
STREPTOCOCCUS PNEUMONIAE SEROTYPE 15B: 7.3 MCG/ML
STREPTOCOCCUS PNEUMONIAE SEROTYPE 33F: 1 MCG/ML

## 2019-05-21 ENCOUNTER — INPATIENT (INPATIENT)
Facility: HOSPITAL | Age: 24
LOS: 2 days | Discharge: HOME | DRG: 546 | End: 2019-05-24
Attending: HOSPITALIST | Admitting: HOSPITALIST
Payer: COMMERCIAL

## 2019-05-21 VITALS
TEMPERATURE: 99 F | DIASTOLIC BLOOD PRESSURE: 68 MMHG | RESPIRATION RATE: 18 BRPM | SYSTOLIC BLOOD PRESSURE: 101 MMHG | WEIGHT: 115.96 LBS | HEIGHT: 66 IN | HEART RATE: 110 BPM | OXYGEN SATURATION: 99 %

## 2019-05-21 DIAGNOSIS — A49.9 BACTERIAL INFECTION, UNSPECIFIED: ICD-10-CM

## 2019-05-21 LAB
ALBUMIN SERPL ELPH-MCNC: 3 G/DL — LOW (ref 3.3–5)
ALP SERPL-CCNC: 50 U/L — SIGNIFICANT CHANGE UP (ref 40–120)
ALT FLD-CCNC: 7 U/L — LOW (ref 10–45)
ANION GAP SERPL CALC-SCNC: 10 MMOL/L — SIGNIFICANT CHANGE UP (ref 5–17)
APPEARANCE UR: CLEAR — SIGNIFICANT CHANGE UP
AST SERPL-CCNC: 30 U/L — SIGNIFICANT CHANGE UP (ref 10–40)
BACTERIA # UR AUTO: NEGATIVE — SIGNIFICANT CHANGE UP
BILIRUB SERPL-MCNC: 0.4 MG/DL — SIGNIFICANT CHANGE UP (ref 0.2–1.2)
BILIRUB UR-MCNC: NEGATIVE — SIGNIFICANT CHANGE UP
BUN SERPL-MCNC: 13 MG/DL — SIGNIFICANT CHANGE UP (ref 7–23)
CALCIUM SERPL-MCNC: 8.3 MG/DL — LOW (ref 8.4–10.5)
CHLORIDE SERPL-SCNC: 100 MMOL/L — SIGNIFICANT CHANGE UP (ref 96–108)
CO2 SERPL-SCNC: 25 MMOL/L — SIGNIFICANT CHANGE UP (ref 22–31)
COLOR SPEC: YELLOW — SIGNIFICANT CHANGE UP
CREAT SERPL-MCNC: 0.73 MG/DL — SIGNIFICANT CHANGE UP (ref 0.5–1.3)
DIFF PNL FLD: ABNORMAL
EPI CELLS # UR: 8 /HPF — HIGH
ERYTHROCYTE [SEDIMENTATION RATE] IN BLOOD: 96 MM/HR — HIGH (ref 0–15)
GLUCOSE SERPL-MCNC: 74 MG/DL — SIGNIFICANT CHANGE UP (ref 70–99)
GLUCOSE UR QL: NEGATIVE — SIGNIFICANT CHANGE UP
HCT VFR BLD CALC: 30.4 % — LOW (ref 34.5–45)
HGB BLD-MCNC: 9.8 G/DL — LOW (ref 11.5–15.5)
HYALINE CASTS # UR AUTO: 0 /LPF — SIGNIFICANT CHANGE UP (ref 0–7)
KETONES UR-MCNC: ABNORMAL
LACTATE BLDV-MCNC: 1.4 MMOL/L — SIGNIFICANT CHANGE UP (ref 0.7–2)
LEUKOCYTE ESTERASE UR-ACNC: ABNORMAL
LYMPHOCYTES # BLD AUTO: 1.1 K/UL — SIGNIFICANT CHANGE UP (ref 1–3.3)
LYMPHOCYTES # BLD AUTO: 41 % — SIGNIFICANT CHANGE UP (ref 13–44)
MCHC RBC-ENTMCNC: 29.4 PG — SIGNIFICANT CHANGE UP (ref 27–34)
MCHC RBC-ENTMCNC: 32.3 GM/DL — SIGNIFICANT CHANGE UP (ref 32–36)
MCV RBC AUTO: 90.9 FL — SIGNIFICANT CHANGE UP (ref 80–100)
MONOCYTES # BLD AUTO: 0.1 K/UL — SIGNIFICANT CHANGE UP (ref 0–0.9)
MONOCYTES NFR BLD AUTO: 3 % — SIGNIFICANT CHANGE UP (ref 2–14)
NEUTROPHILS # BLD AUTO: 1.6 K/UL — LOW (ref 1.8–7.4)
NEUTROPHILS NFR BLD AUTO: 56 % — SIGNIFICANT CHANGE UP (ref 43–77)
NITRITE UR-MCNC: NEGATIVE — SIGNIFICANT CHANGE UP
PH UR: 6.5 — SIGNIFICANT CHANGE UP (ref 5–8)
PLATELET # BLD AUTO: 117 K/UL — LOW (ref 150–400)
POTASSIUM SERPL-MCNC: 4.8 MMOL/L — SIGNIFICANT CHANGE UP (ref 3.5–5.3)
POTASSIUM SERPL-SCNC: 4.8 MMOL/L — SIGNIFICANT CHANGE UP (ref 3.5–5.3)
PROT SERPL-MCNC: 8.8 G/DL — HIGH (ref 6–8.3)
PROT UR-MCNC: 100 — SIGNIFICANT CHANGE UP
RAPID RVP RESULT: SIGNIFICANT CHANGE UP
RBC # BLD: 3.35 M/UL — LOW (ref 3.8–5.2)
RBC # BLD: 3.42 M/UL — LOW (ref 3.8–5.2)
RBC # FLD: 12.3 % — SIGNIFICANT CHANGE UP (ref 10.3–14.5)
RBC CASTS # UR COMP ASSIST: 24 /HPF — HIGH (ref 0–4)
RETICS #: 18.9 K/UL — LOW (ref 25–125)
RETICS/RBC NFR: 0.6 % — SIGNIFICANT CHANGE UP (ref 0.5–2.5)
SODIUM SERPL-SCNC: 135 MMOL/L — SIGNIFICANT CHANGE UP (ref 135–145)
SP GR SPEC: 1.03 — HIGH (ref 1.01–1.02)
UROBILINOGEN FLD QL: ABNORMAL
WBC # BLD: 2.9 K/UL — LOW (ref 3.8–10.5)
WBC # FLD AUTO: 2.9 K/UL — LOW (ref 3.8–10.5)
WBC UR QL: 28 /HPF — HIGH (ref 0–5)

## 2019-05-21 PROCEDURE — 99285 EMERGENCY DEPT VISIT HI MDM: CPT

## 2019-05-21 RX ORDER — SODIUM CHLORIDE 9 MG/ML
1000 INJECTION INTRAMUSCULAR; INTRAVENOUS; SUBCUTANEOUS ONCE
Refills: 0 | Status: COMPLETED | OUTPATIENT
Start: 2019-05-21 | End: 2019-05-21

## 2019-05-21 RX ORDER — ACETAMINOPHEN 500 MG
650 TABLET ORAL ONCE
Refills: 0 | Status: COMPLETED | OUTPATIENT
Start: 2019-05-21 | End: 2019-05-21

## 2019-05-21 RX ORDER — ACETAMINOPHEN 500 MG
1000 TABLET ORAL ONCE
Refills: 0 | Status: COMPLETED | OUTPATIENT
Start: 2019-05-21 | End: 2019-05-21

## 2019-05-21 RX ORDER — CEFTRIAXONE 500 MG/1
1 INJECTION, POWDER, FOR SOLUTION INTRAMUSCULAR; INTRAVENOUS ONCE
Refills: 0 | Status: COMPLETED | OUTPATIENT
Start: 2019-05-21 | End: 2019-05-21

## 2019-05-21 RX ADMIN — CEFTRIAXONE 100 GRAM(S): 500 INJECTION, POWDER, FOR SOLUTION INTRAMUSCULAR; INTRAVENOUS at 18:34

## 2019-05-21 RX ADMIN — SODIUM CHLORIDE 1000 MILLILITER(S): 9 INJECTION INTRAMUSCULAR; INTRAVENOUS; SUBCUTANEOUS at 21:43

## 2019-05-21 NOTE — ED ADULT TRIAGE NOTE - CHIEF COMPLAINT QUOTE
"My doctor called me because I have positive blood cultures that were drawn yesterday" pt c/o fever, chills, joint pains. h/o Lupus  last Tylenol yesterday

## 2019-05-21 NOTE — ED PROVIDER NOTE - CLINICAL SUMMARY MEDICAL DECISION MAKING FREE TEXT BOX
h.o lupus no meds, p.w positive blood cx yesterday and fever for 1.5 wks, joint pain, no other symptoms h.o lupus no meds, p.w positive blood cx yesterday for + cocci and fever for 1.5 wks, joint pain, concerning for sepsis. no meningeal signs, travel hx, rashes, abdominal pain, chestpain, shortness of breath, or cough. will admit for iv abx and sepsis workup. cxr and rvp.

## 2019-05-21 NOTE — ED ADULT NURSE NOTE - NSIMPLEMENTINTERV_GEN_ALL_ED
Implemented All Universal Safety Interventions:  [place] to call system. Call bell, personal items and telephone within reach. Instruct patient to call for assistance. Room bathroom lighting operational. Non-slip footwear when patient is off stretcher. Physically safe environment: no spills, clutter or unnecessary equipment. Stretcher in lowest position, wheels locked, appropriate side rails in place.

## 2019-05-21 NOTE — ED ADULT NURSE NOTE - OBJECTIVE STATEMENT
23 year old female A&OX4 PMHX of Lupus, presents with positive blood cultures call back. Patient states that she has been experiencing fevers, chills, and worsening joint pain to the right leg and bilateral hands. Patient is ambulatory and denies difficulty with mobility. Lung sounds are clear and equal bilaterally. Abdominal pain endorse. Abdomen is soft and non tender to palpation. Denies cough, nausea, vomiting, dizziness, weakness, chest pain, shortness of breath, constipation, diarrhea, urinary symptoms, difficulty urinating.

## 2019-05-21 NOTE — PATIENT PROFILE ADULT - NSPROEDAREADYLEARN_GEN_A_NUR
I have personally evaluated and examined the patient. The Attending was available to me as a supervising provider if needed. none

## 2019-05-21 NOTE — ED PROVIDER NOTE - OBJECTIVE STATEMENT
24 y/o female with a PMHx of lupus (not on any medications) presents to the ED regarding + blood culture. Pt had fever, chills for the past 1.5 weeks, had blood drawn yesterday and called to go to ED today regarding a + blood culture. Last temperature was this morning, 101.8. +mild joint pain. No rash, cough, HA, urinary complaints. Last hospitalization was last year for a lupus flare up. Not taking antipyretics. Has planned abdominal MRI in 3 days for abd pain that she has had since January. No illicit drug use. No EtOH use. Non smoker. LNMP: 5/6/19. Lupus Dr. Vincent Townsend.

## 2019-05-21 NOTE — ED PROVIDER NOTE - ATTENDING CONTRIBUTION TO CARE
Pt sent for +BC = 1 bottle GPC, here for fever for one week along with sxs typical for lupus flare with joint pains.  No GI/Resp/ sxs.  Pt appears well, nontoxic, no rash, no meningeal signs.

## 2019-05-22 DIAGNOSIS — M32.9 SYSTEMIC LUPUS ERYTHEMATOSUS, UNSPECIFIED: ICD-10-CM

## 2019-05-22 DIAGNOSIS — R50.9 FEVER, UNSPECIFIED: ICD-10-CM

## 2019-05-22 DIAGNOSIS — D61.818 OTHER PANCYTOPENIA: ICD-10-CM

## 2019-05-22 DIAGNOSIS — Z29.9 ENCOUNTER FOR PROPHYLACTIC MEASURES, UNSPECIFIED: ICD-10-CM

## 2019-05-22 DIAGNOSIS — R10.12 LEFT UPPER QUADRANT PAIN: ICD-10-CM

## 2019-05-22 LAB
ALBUMIN SERPL ELPH-MCNC: 2.5 G/DL — LOW (ref 3.3–5)
ALP SERPL-CCNC: 45 U/L — SIGNIFICANT CHANGE UP (ref 40–120)
ALT FLD-CCNC: 6 U/L — LOW (ref 10–45)
ANION GAP SERPL CALC-SCNC: 7 MMOL/L — SIGNIFICANT CHANGE UP (ref 5–17)
AST SERPL-CCNC: 26 U/L — SIGNIFICANT CHANGE UP (ref 10–40)
BILIRUB SERPL-MCNC: 0.2 MG/DL — SIGNIFICANT CHANGE UP (ref 0.2–1.2)
BLD GP AB SCN SERPL QL: NEGATIVE — SIGNIFICANT CHANGE UP
BUN SERPL-MCNC: 11 MG/DL — SIGNIFICANT CHANGE UP (ref 7–23)
C3 SERPL-MCNC: 50 MG/DL — LOW (ref 81–157)
C4 SERPL-MCNC: 6 MG/DL — LOW (ref 13–39)
CALCIUM SERPL-MCNC: 8 MG/DL — LOW (ref 8.4–10.5)
CHLORIDE SERPL-SCNC: 105 MMOL/L — SIGNIFICANT CHANGE UP (ref 96–108)
CO2 SERPL-SCNC: 26 MMOL/L — SIGNIFICANT CHANGE UP (ref 22–31)
CREAT ?TM UR-MCNC: 49 MG/DL — SIGNIFICANT CHANGE UP
CREAT SERPL-MCNC: 0.64 MG/DL — SIGNIFICANT CHANGE UP (ref 0.5–1.3)
CRP SERPL-MCNC: 4.96 MG/DL — HIGH (ref 0–0.4)
CULTURE RESULTS: SIGNIFICANT CHANGE UP
GLUCOSE SERPL-MCNC: 95 MG/DL — SIGNIFICANT CHANGE UP (ref 70–99)
HCG SERPL-ACNC: <2 MIU/ML — SIGNIFICANT CHANGE UP
HCT VFR BLD CALC: 28.7 % — LOW (ref 34.5–45)
HETEROPH AB TITR SER AGGL: NEGATIVE — SIGNIFICANT CHANGE UP
HGB BLD-MCNC: 9.2 G/DL — LOW (ref 11.5–15.5)
LIDOCAIN IGE QN: 50 U/L — SIGNIFICANT CHANGE UP (ref 7–60)
MAGNESIUM SERPL-MCNC: 2.4 MG/DL — SIGNIFICANT CHANGE UP (ref 1.6–2.6)
MCHC RBC-ENTMCNC: 29.4 PG — SIGNIFICANT CHANGE UP (ref 27–34)
MCHC RBC-ENTMCNC: 32 GM/DL — SIGNIFICANT CHANGE UP (ref 32–36)
MCV RBC AUTO: 91.9 FL — SIGNIFICANT CHANGE UP (ref 80–100)
PHOSPHATE SERPL-MCNC: 4 MG/DL — SIGNIFICANT CHANGE UP (ref 2.5–4.5)
PLATELET # BLD AUTO: 107 K/UL — LOW (ref 150–400)
POTASSIUM SERPL-MCNC: 4.4 MMOL/L — SIGNIFICANT CHANGE UP (ref 3.5–5.3)
POTASSIUM SERPL-SCNC: 4.4 MMOL/L — SIGNIFICANT CHANGE UP (ref 3.5–5.3)
PROT ?TM UR-MCNC: 18 MG/DL — HIGH (ref 0–12)
PROT SERPL-MCNC: 7.6 G/DL — SIGNIFICANT CHANGE UP (ref 6–8.3)
PROT/CREAT UR-RTO: 0.4 RATIO — HIGH (ref 0–0.2)
RBC # BLD: 3.13 M/UL — LOW (ref 3.8–5.2)
RBC # FLD: 12.3 % — SIGNIFICANT CHANGE UP (ref 10.3–14.5)
RH IG SCN BLD-IMP: POSITIVE — SIGNIFICANT CHANGE UP
SODIUM SERPL-SCNC: 138 MMOL/L — SIGNIFICANT CHANGE UP (ref 135–145)
SPECIMEN SOURCE: SIGNIFICANT CHANGE UP
WBC # BLD: 1.8 K/UL — LOW (ref 3.8–10.5)
WBC # FLD AUTO: 1.8 K/UL — LOW (ref 3.8–10.5)

## 2019-05-22 PROCEDURE — 99255 IP/OBS CONSLTJ NEW/EST HI 80: CPT

## 2019-05-22 PROCEDURE — 12345: CPT | Mod: NC,GC

## 2019-05-22 PROCEDURE — 71045 X-RAY EXAM CHEST 1 VIEW: CPT | Mod: 26

## 2019-05-22 PROCEDURE — 99223 1ST HOSP IP/OBS HIGH 75: CPT | Mod: GC

## 2019-05-22 PROCEDURE — 74176 CT ABD & PELVIS W/O CONTRAST: CPT | Mod: 26

## 2019-05-22 RX ORDER — CEFEPIME 1 G/1
2000 INJECTION, POWDER, FOR SOLUTION INTRAMUSCULAR; INTRAVENOUS EVERY 8 HOURS
Refills: 0 | Status: DISCONTINUED | OUTPATIENT
Start: 2019-05-22 | End: 2019-05-24

## 2019-05-22 RX ORDER — ENOXAPARIN SODIUM 100 MG/ML
40 INJECTION SUBCUTANEOUS DAILY
Refills: 0 | Status: DISCONTINUED | OUTPATIENT
Start: 2019-05-22 | End: 2019-05-24

## 2019-05-22 RX ORDER — CEFTRIAXONE 500 MG/1
1 INJECTION, POWDER, FOR SOLUTION INTRAMUSCULAR; INTRAVENOUS EVERY 24 HOURS
Refills: 0 | Status: DISCONTINUED | OUTPATIENT
Start: 2019-05-22 | End: 2019-05-22

## 2019-05-22 RX ORDER — SODIUM CHLORIDE 9 MG/ML
1000 INJECTION INTRAMUSCULAR; INTRAVENOUS; SUBCUTANEOUS ONCE
Refills: 0 | Status: COMPLETED | OUTPATIENT
Start: 2019-05-22 | End: 2019-05-22

## 2019-05-22 RX ADMIN — CEFEPIME 100 MILLIGRAM(S): 1 INJECTION, POWDER, FOR SOLUTION INTRAMUSCULAR; INTRAVENOUS at 05:46

## 2019-05-22 RX ADMIN — Medication 400 MILLIGRAM(S): at 00:30

## 2019-05-22 RX ADMIN — CEFEPIME 100 MILLIGRAM(S): 1 INJECTION, POWDER, FOR SOLUTION INTRAMUSCULAR; INTRAVENOUS at 14:04

## 2019-05-22 RX ADMIN — Medication 1000 MILLIGRAM(S): at 01:00

## 2019-05-22 RX ADMIN — SODIUM CHLORIDE 1000 MILLILITER(S): 9 INJECTION INTRAMUSCULAR; INTRAVENOUS; SUBCUTANEOUS at 06:47

## 2019-05-22 RX ADMIN — CEFEPIME 100 MILLIGRAM(S): 1 INJECTION, POWDER, FOR SOLUTION INTRAMUSCULAR; INTRAVENOUS at 21:05

## 2019-05-22 NOTE — PROGRESS NOTE ADULT - PROBLEM SELECTOR PLAN 2
Unclear: infectious process vs SLE flare  ESR/CRP elevated could be seen in both the processes mentioned above  ANC 1600 with pancytopenia, elev fevers concerning for infection: UA neg for bacteria & no urinary sxs, obtain CXR in AM, f/u Bcx x 2, Ucx, mononucleosis screen, given borderline ANC will continue cefepime for now, can deescalate therapy if no infectious etiology elucidated  fever could be due to SLE flare, mentioned she has high grade fevers at time of flare up, pancytopenia, elev ESR, CRP support flare, f/u C3, C4, DsDNA  positive blood culture outpatient likely contaminant Possibly due to infectious process or SLE flare  -ESR/CRP elevated  -F/w C3,C4, KENAN, Ds DNA, EBV, CMV, parainfluenza virus, mononucleosis, and Bcx.

## 2019-05-22 NOTE — CONSULT NOTE ADULT - ASSESSMENT
23yoF hx of SLE (dx 2017, +Sm +SSB +dsDNA, low complements arthritis rash leukopenia) presenting with fever and body pains.     Pt febrile, pancytopenic, hypocomplementemic. Her symptoms can be c/w SLE flare- pt is not on any therapy for SLE at this time- stopped Plaquenil months ago   Pancytopenias have been previously attributed to SLE however pt with cytopenias fevers and active SLE- would raise suspicion for HLH/MAS as well.   Pt also w/  +blood cultures at outpt PMD resulting in hospital admission.    - Will f/u dsDNA, check CK  - f/u blood cultures. If negative, will start glucocorticoid therapy  - Check Antiphospholipid antibodies - SLE patients are at risk for APS and increased thrombotic events  - Check Ferritin and TG in AM for further evaluation of possible HLH  - Check iron studies, LDH, Hapto, Retic to further assess anemia  - Pt will need further DMARD therapy as outpt for better control of her SLE, can consider Benlysta as her primary symptoms seem to be related to     Will discuss w/ her outpt rheumatologist Dr. Pacheco    Will follow with you    Candace Levin MD  Pager [phone] 23yoF hx of SLE (dx 2017, +Sm +SSB +dsDNA, low complements arthritis rash leukopenia) presenting with fever and body pains.     Pt febrile, pancytopenic, hypocomplementemic. Her symptoms can be c/w SLE flare- pt is not on any therapy for SLE at this time- stopped Plaquenil months ago.   Pancytopenias have been previously attributed to SLE however pt with cytopenias fevers and active SLE- would raise suspicion for HLH/MAS as well.   Pt also w/  +blood cultures at outpt PMD resulting in hospital admission.    - Will f/u dsDNA, check CK  - f/u blood cultures. If negative, will start glucocorticoid therapy  - Check Antiphospholipid antibodies - SLE patients are at risk for APS and increased thrombotic events  - Check Ferritin and TG in AM for further evaluation of possible HLH  - Check iron studies, LDH, Hapto, Retic to further assess anemia  - Pt will need further DMARD therapy as outpt for better control of her SLE, can consider Benlysta as her primary symptoms seem to be related to     Will discuss w/ her outpt rheumatologist Dr. Pacheco    Will follow with you    Candace Levin MD  Pager [phone]

## 2019-05-22 NOTE — H&P ADULT - NSHPREVIEWOFSYSTEMS_GEN_ALL_CORE
REVIEW OF SYSTEMS:    CONSTITUTIONAL: +ve weakness, fevers and chills  EYES/ENT: No visual changes;  No vertigo or throat pain   NECK: No pain or stiffness  RESPIRATORY: No cough, wheezing, hemoptysis; No shortness of breath  CARDIOVASCULAR: No chest pain or palpitations  GASTROINTESTINAL: +ve LUQ abdominal pain. No nausea, vomiting, or hematemesis; No diarrhea or constipation. No melena or hematochezia.  GENITOURINARY: No dysuria, frequency or hematuria  NEUROLOGICAL: No numbness or weakness  SKIN: No itching, burning, rashes, or lesions   Psych: No depression or visual hallucination  MSK: R. knee and R. ankle pain, R. Wrist, R hand pain, L. hand pain   All other review of systems is negative unless indicated above.

## 2019-05-22 NOTE — H&P ADULT - NSHPPHYSICALEXAM_GEN_ALL_CORE
Vital Signs Last 24 Hrs  T(C): 37.9 (21 May 2019 23:28), Max: 38.3 (21 May 2019 21:42)  T(F): 100.3 (21 May 2019 23:28), Max: 101 (21 May 2019 21:42)  HR: 98 (21 May 2019 23:28) (98 - 110)  BP: 100/67 (21 May 2019 23:28) (98/65 - 101/68)  BP(mean): --  RR: 19 (21 May 2019 23:28) (18 - 19)  SpO2: 100% (21 May 2019 23:28) (99% - 100%)    General: skin warm to touch, NAD  Neurology: A&Ox3, nonfocal, JOSUE x 4  Eyes: PERRL  ENT/Neck: No oral ulcers, cervical, or submandibular lymphadenopathy, Neck supple, No JVD, Gross hearing intact  Respiratory: CTA B/L, No wheezing, rales, rhonchi  CV: RRR, S1S2, no murmurs, rubs or gallops  Abdominal: Soft, TTP LUQ, nl BS, non distended  Extremities: No edema, + peripheral pulses  MSK: R. wrist TTP, other joints non erythematous, edematous, tender  Skin: No Rashes, Hematoma, Ecchymosis Vital Signs Last 24 Hrs  T(C): 37.9 (21 May 2019 23:28), Max: 38.3 (21 May 2019 21:42)  T(F): 100.3 (21 May 2019 23:28), Max: 101 (21 May 2019 21:42)  HR: 98 (21 May 2019 23:28) (98 - 110)  BP: 100/67 (21 May 2019 23:28) (98/65 - 101/68)  BP(mean): --  RR: 19 (21 May 2019 23:28) (18 - 19)  SpO2: 100% (21 May 2019 23:28) (99% - 100%)    General: skin warm to touch, NAD  Neurology: A&Ox3, nonfocal, JOSUE x 4  Eyes: PERRL  ENT/Neck: No oral ulcers, cervical, or submandibular lymphadenopathy, Neck supple, No JVD, Gross hearing intact  Respiratory: CTA B/L, No wheezing, rales, rhonchi  CV: RRR, S1S2, no murmurs, rubs or gallops  Abdominal: Soft, TTP LUQ, nl BS, non distended  Extremities: No edema, + peripheral pulses  MSK: R. wrist mild TTP, other joints non erythematous, edematous, nontender  Skin: No Rashes, Hematoma, Ecchymosis

## 2019-05-22 NOTE — H&P ADULT - PROBLEM SELECTOR PLAN 4
decrease in WBC, Hgb, Platelets likely in setting of SLE flare however cannot rule out infectious etiology  previously has been parvovirus neg, prior exposure to EBV and CMV  ctm  keep Active T&S decrease in WBC, Hgb, Platelets likely in setting of SLE flare however cannot rule out infectious etiology  previously has been parvovirus neg, prior exposure to EBV and CMV, recheck panel in AM  ctm  keep Active T&S

## 2019-05-22 NOTE — H&P ADULT - PROBLEM SELECTOR PLAN 2
DDX: pancreatitis, gastritis, mesenteric ischemia, malignancy  check lipase in AM  per pt upper endoscopy was negative and US abdomen showed lesion, given fevers warrants CT abdomen/Pelvis Unclear: infectious process vs SLE flare  ESR/CRP elevated could be seen in both the processes mentioned above  ANC 1600 with pancytopenia, elev fevers concerning for infection: UA neg for bacteria & no urinary sxs, obtain CXR in AM, f/u Bcx x 2, Ucx, mononucleosis screen, given borderline ANC will continue cefepime for now, can deescalate therapy if no infectious etiology elucidated  fever could be due to SLE flare, mentioned she has high grade fevers at time of flare up, pancytopenia, elev ESR, CRP support flare, f/u C3, C4, DsDNA  positive blood culture outpatient likely contaminant

## 2019-05-22 NOTE — PROGRESS NOTE ADULT - PROBLEM SELECTOR PLAN 4
decrease in WBC, Hgb, Platelets likely in setting of SLE flare however cannot rule out infectious etiology  previously has been parvovirus neg, prior exposure to EBV and CMV, recheck panel in AM  ctm  keep Active T&S -currently not on immunomodulator or immunosuppressant  -given concern for infection, will hold off on prednisone   -C3, C4 and DsDNA pending   -Rheum consult pending

## 2019-05-22 NOTE — CONSULT NOTE ADULT - SUBJECTIVE AND OBJECTIVE BOX
SAVANA WATKINS  [id number]    HISTORY OF PRESENT ILLNESS:  Pt is a 23yoF hx of SLE (dx 2017, +Sm +SSB +dsDNA, low complements arthritis rash leukopenia) presenting with fever and body pains.     States that about 2 weeks ago, she began to feel pain in her necks and difficulty walking up the stairs. Noted swelling in her hands and had fever of 102.8F. Took Motrin which helped symptoms improve. Went to PMD few days ago, had bloodwork done, Cultures growing coag neg staph- advised to come to ER.   Denies any CP/SOB/rashes/ulcers/alopecia/muscle pains/hematuria. No recent travel or sick contacts.     Follows w/ Dr. Kushal Pacheco at [facility]. Patient's history obtained from Dr. Pacheco.  Notes that she was diagnosed in  when admitted to [facility]. Found to be pancytopenic with +SLE serologies, responsive to steroids. Was again admitted in 2018 for fevers fatigue and arthralgias, found to be pancytopenic again, given steroids. Dr. Pacheco has been tapering steroids as outpt, discontinued last summer. Pt is to be on Plaquenil but does not take it regularly. Pt admits that she stopped taking Plaquenil several months ago.  Pt has no renal or neuro involvement from SLE    PAST MEDICAL & SURGICAL HISTORY:  SLE (systemic lupus erythematosus)  No significant past surgical history      Review of Systems:  Gen:  +fevers +fatigue   HEENT: No blurry vision, no difficulty swallowing  CVS: No chest pain/palpitations  Resp: No SOB/wheezing  GI: No N/V/C/D/abdominal pain  MSK: +joint pains  Skin: No new rashes  Neuro: No headaches    MEDICATIONS  (STANDING):  cefepime   IVPB 2000 milliGRAM(s) IV Intermittent every 8 hours  enoxaparin Injectable 40 milliGRAM(s) SubCutaneous daily    MEDICATIONS  (PRN):      Allergies    No Known Allergies    Intolerances        PERTINENT MEDICATION HISTORY:    SOCIAL HISTORY: lives alone, no toxic habits. Works at [employer]    FAMILY HISTORY:  FH: breast cancer: in grandmother      Vital Signs Last 24 Hrs  T(C): 37.4 (22 May 2019 14:33), Max: 38.3 (21 May 2019 21:42)  T(F): 99.3 (22 May 2019 14:33), Max: 101 (21 May 2019 21:42)  HR: 90 (22 May 2019 14:33) (71 - 100)  BP: 96/66 (22 May 2019 14:33) (92/61 - 100/67)  BP(mean): --  RR: 18 (22 May 2019 14:33) (18 - 19)  SpO2: 100% (22 May 2019 14:33) (100% - 100%)    Physical Exam:  General: No apparent distress  HEENT: EOMI, MMM  CVS: +S1/S2, RRR, no murmurs/rubs/gallops  Resp: CTA b/l. No crackles/wheezing  GI: Soft, LLQ tenderness, no RUQ tenderness   MSK:  Shoulders: wnl  Elbows: wnl  Wrists: wnl  MCPs: wnl  PIPs: wnl  DIPs: wnl   Hips: wnl  Knees: wnl   Ankle: wnl  Neuro: AAOx3  Skin: no visible rashes    LABS:                        9.2    1.8   )-----------( 107      ( 22 May 2019 07:10 )             28.7     05-    138  |  105  |  11  ----------------------------<  95  4.4   |  26  |  0.64    Ca    8.0<L>      22 May 2019 07:09  Phos  4.0       Mg     2.4         TPro  7.6  /  Alb  2.5<L>  /  TBili  0.2  /  DBili  x   /  AST  26  /  ALT  6<L>  /  AlkPhos  45  05-22      Urinalysis Basic - ( 21 May 2019 18:55 )    Color: Yellow / Appearance: Clear / S.030 / pH: x  Gluc: x / Ketone: Small  / Bili: Negative / Urobili: 3 mg/dL   Blood: x / Protein: 100 / Nitrite: Negative   Leuk Esterase: Moderate / RBC: 24 /hpf / WBC 28 /HPF   Sq Epi: x / Non Sq Epi: 8 /hpf / Bacteria: Negative    Anti-Nuclear Antibody (18 @ 23:37)    Anti Nuclear Factor Titer: Negative    Double Stranded DNA Antibody (18 @ 23:37)    Double Stranded DNA Antibody: 655: Method: EIA            Reference Ranges            Interpretation            < 30      IU/mL     Negative            30 - 75  IU/mL     Borderline            > 75      IU/mL     Positive IU/mL    KAZ Antibody Screening Test (17 @ 14:38)    SM (Louise) Ab FBIA: >8.0 AI    Anti-Ribonuclear Protein: 6.6: Fluorescent Bead Immunoassy                      Reference Ranges for RNP and SM:                      <1.0 AI (negative)                      > or =1.0 AI (positive)                      Reference values apply to all ages AI    Sjogren&#x27;s Syndrome Antibodies (17 @ 23:31)    Anti SS-A Antibody: >8.0 AI    Anti SS-B Antibody: 1.2: Fluorescent Bead Immunoassay   Reference Ranges for SS-A AND SS-B:   <1.0 AI (negative)   > or =1.0 AI (positive)   Reference values apply  to all ages. AI      C3 Complement, Serum (19 @ 10:10)    C3 Complement, Serum: 50 mg/dL    C4 Complement, Serum (19 @ 10:10)    C4 Complement, Serum: 6 mg/dL    Culture - Urine (19 @ 00:19)    Specimen Source: .Urine    Culture Results:   <10,000 CFU/mL Normal Urogenital Katelyn        RADIOLOGY & ADDITIONAL STUDIES:  < from: CT Abdomen and Pelvis No Cont (19 @ 08:38) >  IMPRESSION:   Limited evaluation secondary to lack of contrast administration.    Left basilar opacity, likely early pneumonia.    No evidence of acute intra-abdominal process.    < end of copied text > SAVANA WATKINS  [id number]    HISTORY OF PRESENT ILLNESS:  Pt is a 23yoF hx of SLE (dx 2017, +Sm +SSB +dsDNA, low complements arthritis rash leukopenia) presenting with fever and body pains.     States that about 2 weeks ago, she began to feel pain in her necks and difficulty walking up the stairs. Noted swelling in her hands and had fever of 102.8F. Took Motrin which helped symptoms improve. Went to PMD few days ago, had bloodwork done, Cultures growing coag neg staph- advised to come to ER.   Denies any CP/SOB/rashes/ulcers/alopecia/muscle pains/hematuria. No recent travel or sick contacts.     Follows w/ Dr. Kushal Pacheco at [facility]. Patient's history obtained from Dr. Pacheco.  Notes that she was diagnosed in  when admitted to [facility]. Found to be pancytopenic with +SLE serologies, responsive to steroids. Was again admitted in 2018 for fevers fatigue and arthralgias, found to be pancytopenic again, given steroids. Dr. Pacheco has been tapering steroids as outpt, discontinued last summer. Pt is to be on Plaquenil but does not take it regularly. Pt admits that she stopped taking Plaquenil several months ago.  Pt has no renal or neuro involvement from SLE    PAST MEDICAL & SURGICAL HISTORY:  SLE (systemic lupus erythematosus)  No significant past surgical history      Review of Systems:  Gen:  +fevers +fatigue   HEENT: No blurry vision, no difficulty swallowing  CVS: No chest pain/palpitations  Resp: No SOB/wheezing  GI: No N/V/C/D/abdominal pain  MSK: +joint pains  Skin: No new rashes  Neuro: No headaches    MEDICATIONS  (STANDING):  cefepime   IVPB 2000 milliGRAM(s) IV Intermittent every 8 hours  enoxaparin Injectable 40 milliGRAM(s) SubCutaneous daily    MEDICATIONS  (PRN):      Allergies    No Known Allergies    Intolerances        PERTINENT MEDICATION HISTORY:    SOCIAL HISTORY: lives alone, no toxic habits. Works at [employer]    FAMILY HISTORY:  FH: breast cancer: in grandmother      Vital Signs Last 24 Hrs  T(C): 37.4 (22 May 2019 14:33), Max: 38.3 (21 May 2019 21:42)  T(F): 99.3 (22 May 2019 14:33), Max: 101 (21 May 2019 21:42)  HR: 90 (22 May 2019 14:33) (71 - 100)  BP: 96/66 (22 May 2019 14:33) (92/61 - 100/67)  BP(mean): --  RR: 18 (22 May 2019 14:33) (18 - 19)  SpO2: 100% (22 May 2019 14:33) (100% - 100%)    Physical Exam:  General: No apparent distress  HEENT: EOMI, MMM  CVS: +S1/S2, RRR, no murmurs/rubs/gallops  Resp: CTA b/l. No crackles/wheezing  GI: Soft, LLQ tenderness, no RUQ tenderness   MSK:  Shoulders: wnl  Elbows: wnl  Wrists: wnl  MCPs: wnl  PIPs: wnl  DIPs: wnl   Hips: wnl  Knees: wnl   Ankle: left ankle with decreased TT and ST motion, warmth and swelling.  no erythema  Neuro: AAOx3  Skin: no visible rashes    LABS:                        9.2    1.8   )-----------( 107      ( 22 May 2019 07:10 )             28.7     05-    138  |  105  |  11  ----------------------------<  95  4.4   |  26  |  0.64    Ca    8.0<L>      22 May 2019 07:09  Phos  4.0     05-  Mg     2.4     05-22    TPro  7.6  /  Alb  2.5<L>  /  TBili  0.2  /  DBili  x   /  AST  26  /  ALT  6<L>  /  AlkPhos  45  05-22      Urinalysis Basic - ( 21 May 2019 18:55 )    Color: Yellow / Appearance: Clear / S.030 / pH: x  Gluc: x / Ketone: Small  / Bili: Negative / Urobili: 3 mg/dL   Blood: x / Protein: 100 / Nitrite: Negative   Leuk Esterase: Moderate / RBC: 24 /hpf / WBC 28 /HPF   Sq Epi: x / Non Sq Epi: 8 /hpf / Bacteria: Negative    Anti-Nuclear Antibody (18 @ 23:37)    Anti Nuclear Factor Titer: Negative    Double Stranded DNA Antibody (02.14.18 @ 23:37)    Double Stranded DNA Antibody: 655: Method: EIA            Reference Ranges            Interpretation            < 30      IU/mL     Negative            30 - 75  IU/mL     Borderline            > 75      IU/mL     Positive IU/mL    KAZ Antibody Screening Test (17 @ 14:38)    SM (Louise) Ab FBIA: >8.0 AI    Anti-Ribonuclear Protein: 6.6: Fluorescent Bead Immunoassy                      Reference Ranges for RNP and SM:                      <1.0 AI (negative)                      > or =1.0 AI (positive)                      Reference values apply to all ages AI    Sjogren&#x27;s Syndrome Antibodies (17 @ 23:31)    Anti SS-A Antibody: >8.0 AI    Anti SS-B Antibody: 1.2: Fluorescent Bead Immunoassay   Reference Ranges for SS-A AND SS-B:   <1.0 AI (negative)   > or =1.0 AI (positive)   Reference values apply  to all ages. AI      C3 Complement, Serum (19 @ 10:10)    C3 Complement, Serum: 50 mg/dL    C4 Complement, Serum (19 @ 10:10)    C4 Complement, Serum: 6 mg/dL    Culture - Urine (19 @ 00:19)    Specimen Source: .Urine    Culture Results:   <10,000 CFU/mL Normal Urogenital Katelyn        RADIOLOGY & ADDITIONAL STUDIES:  < from: CT Abdomen and Pelvis No Cont (19 @ 08:38) >  IMPRESSION:   Limited evaluation secondary to lack of contrast administration.    Left basilar opacity, likely early pneumonia.    No evidence of acute intra-abdominal process.    < end of copied text >

## 2019-05-22 NOTE — PROGRESS NOTE ADULT - ASSESSMENT
23 year old female with pmhx of SLE (dx 2017) admitted for SLE flare vs infectious etiology 23 year old female with pmhx of SLE (dx 2017) admitted for SLE flare or infectious etiology.

## 2019-05-22 NOTE — H&P ADULT - NSHPSOCIALHISTORY_GEN_ALL_CORE
Denied toxic habits. Lives at home  Sexual history: Currently not sexually active, previously active in Nov 2018 with one male partner with use of barrier contraceptives; LMP 2 weeks ago

## 2019-05-22 NOTE — PROGRESS NOTE ADULT - PROBLEM SELECTOR PLAN 3
currently not on immunomodulator or immunosuppressant  given concern for infection, hold off on high dose steroids for flare  once infection has been definitively ruled out consider prednisone taper  f/u C3, C4, DsDNA  rheum consult in AM Decrease in WBC, Hgb, platelets likely in setting of SLE flare or infectious etiology   -ANC <1500 with fever and as a result, started on cefepime   -Active type and screen  -Urine and blood culture negative.

## 2019-05-22 NOTE — H&P ADULT - PROBLEM SELECTOR PLAN 5
Colon and Rectal Surgery Teleconsulte Note  [facility]    Tonio Alarcon MRN# [id number]   Age: 38 year old YOB: 1979     Date of Admission:  6/8/2018    Reason for consult: Rectal pain       Requesting physician: Monisha Rodriguez MD       Level of consult: Consult, place orders and assume complete care of the patient           Assessment:   Tonio Alarcon is a 38 year old M with PMH of hemorrhoidectomy in 2015 presenting w/ rectal pain and imaging consistent with a left perianal abscess          Recommendations:   -Will add on for I&D of perianal abscess  -NPO, IV cipro + flagyl  -Preop orders placed, will obtain consent  -Pt likely to discharge to home postoperatively          History of Present Illness:   CC: Tonio Alarcon is a 39 year old M w/ PMH of hemorrhoidectomy in 2015 presenting with 10 out of 10 rectal pain over the past 2-3 days. He has been having painful BMs, his stools have been soft, formed, and nonbloody. He has been taking tylenol for the pain. He denies fever, CP, SOB, or abdominal pain. He has been NPO since last night. CT obtained in the ED demonstrates a 3 x 2 x 1.5cm left perianal abscess.     History is obtained from the electronic health record and emergency department physician          Past Medical History:     Past Medical History:   Diagnosis Date     Depressive disorder              Past Surgical History:     Past Surgical History:   Procedure Laterality Date     GI SURGERY      Hemorrhoids      HEMORRHOID SURGERY  2015     LAPAROSCOPIC HERNIORRHAPHY EPIGASTRIC N/A 10/11/2017    Procedure: LAPAROSCOPIC HERNIORRHAPHY EPIGASTRIC;  Exploratory Laparoscopy, Exploratory Laparotomy, EGD, Omental Patch, Upper Endoscopy;  Surgeon: Celio Keyes MD;  Location:  OR             Social History:     Social History     Social History     Marital status:      Spouse name: N/A     Number of children: 6     Years of education: N/A  "    Occupational History     recycling      Social History Main Topics     Smoking status: Current Every Day Smoker     Packs/day: 1.00     Years: 25.00     Smokeless tobacco: Never Used     Alcohol use Yes      Comment: heavy drinking about 2 x per week     Drug use: Yes     Special: Marijuana      Comment: 2 gms daily     Sexual activity: Yes     Partners: Female     Birth control/ protection: Pill     Other Topics Concern     Parent/Sibling W/ Cabg, Mi Or Angioplasty Before 65f 55m? No     Social History Narrative    Live at home with spouse and 6 kids.             Family History:     Family History   Problem Relation Age of Onset     DIABETES Mother      Breast Cancer Mother      Dx at 61 years aol     Coronary Artery Disease Mother      3v CABG     CANCER Father      Stomach ca - Dx at 57 years old and passed away from this     Cancer - colorectal Maternal Grandmother      DIABETES Brother              Allergies:    No Known Allergies          Medications:     No current facility-administered medications on file prior to encounter.   No current outpatient prescriptions on file prior to encounter.          Review of Systems:     Per HPI, otherwise negative        Physical Exam:   BP (!) 150/100  Pulse 100  Temp 98.2  F (36.8  C) (Oral)  Resp 20  Ht 1.803 m (5' 11\")  Wt 83 kg (183 lb)  SpO2 100%  BMI 25.52 kg/m2  Not examined         Data:   All laboratory data reviewed  Lab Results   Component Value Date    WBC 8.4 06/08/2018    HGB 14.6 06/08/2018    HCT 42.7 06/08/2018     06/08/2018     06/08/2018    POTASSIUM 3.8 06/08/2018    CHLORIDE 109 06/08/2018    CO2 27 06/08/2018    BUN 11 06/08/2018    CR 0.81 06/08/2018    GLC 94 06/08/2018    SED 4 04/17/2017    DD 0.4 10/03/2017    NTBNPI 10 04/21/2017    TROPONIN 0.00 04/21/2017    TROPI <0.015 05/06/2018    AST 22 05/06/2018    ALT 14 05/06/2018    ALKPHOS 93 05/06/2018    BILITOTAL 0.5 05/06/2018     All imaging studies reviewed by me.  CT " Pelvis:   There is a complex fluid collection in the left perianal  tissues consistent with an abscess. This measures approximately 2.2 x  1.5 x 3.1 cm. There is no intrapelvic extension. No bowel obstruction  or other inflammation. The appendix is normal. There are few mildly  enlarged pelvic lymph nodes. A left external iliac chain node on image  #37 measures 2.2 x 1.5 cm. There is degenerative disease in the lower  lumbar spine.      Antonio Zaman MD  General Surgery Resident, PGY1  Pager # [phone]  10:28 AM, 6/8/2018    Plan discussed with Dr. Valero                DVT ppx: lovenox subq  Diet: Regular diet  Barby Levin PGY-2  Pager # [phone]/ [phone]

## 2019-05-22 NOTE — PROGRESS NOTE ADULT - PROBLEM SELECTOR PLAN 1
DDX: pancreatitis, gastritis, mesenteric ischemia, malignancy  check lipase in AM  per pt upper endoscopy was negative and US abdomen showed benign liver lesion, given fevers warrants CT abdomen/Pelvis  can do trial of maalox Most likely due to gastritis, cholecystitis, pancreatitis or constipation,   -CT A/P showed no acute finding   -Outpatient MRI showed hepatic lesion. EGD was negative for any acute finding as well.   -Continue with maalox and supportive management.

## 2019-05-22 NOTE — PROGRESS NOTE ADULT - SUBJECTIVE AND OBJECTIVE BOX
Patient is a 23y old  Female who presents with a chief complaint of fever (22 May 2019 01:29)      SUBJECTIVE / OVERNIGHT EVENTS:          MEDICATIONS  (STANDING):  cefepime   IVPB 2000 milliGRAM(s) IV Intermittent every 8 hours  enoxaparin Injectable 40 milliGRAM(s) SubCutaneous daily    MEDICATIONS  (PRN):      Vital Signs Last 24 Hrs  T(C): 36.4 (22 May 2019 06:44), Max: 38.3 (21 May 2019 21:42)  T(F): 97.6 (22 May 2019 06:44), Max: 101 (21 May 2019 21:42)  HR: 75 (22 May 2019 06:44) (75 - 110)  BP: 92/61 (22 May 2019 06:44) (92/61 - 101/68)  BP(mean): --  RR: 18 (22 May 2019 06:44) (18 - 19)  SpO2: 100% (22 May 2019 06:44) (99% - 100%)      PHYSICAL EXAM  GENERAL: NAD, well-developed  HEAD:  Atraumatic, Normocephalic  EYES: EOMI, PERRLA, conjunctiva and sclera clear  NECK: Supple, No JVD  CHEST/LUNG: Clear to auscultation bilaterally; No wheeze  HEART: Regular rate and rhythm; No murmurs, rubs, or gallops  ABDOMEN: Soft, Nontender, Nondistended; Bowel sounds present  EXTREMITIES:  2+ Peripheral Pulses, No clubbing, cyanosis, or edema  PSYCH: AAOx3  SKIN: No rashes or lesions    CAPILLARY BLOOD GLUCOSE        I&O's Summary    21 May 2019 07:01  -  22 May 2019 07:00  --------------------------------------------------------  IN: 1250 mL / OUT: 0 mL / NET: 1250 mL        LABS:                        9.2    1.8   )-----------( 107      ( 22 May 2019 07:10 )             28.7     -    138  |  105  |  11  ----------------------------<  95  4.4   |  26  |  0.64    Ca    8.0<L>      22 May 2019 07:09  Phos  4.0       Mg     2.4         TPro  7.6  /  Alb  2.5<L>  /  TBili  0.2  /  DBili  x   /  AST  26  /  ALT  6<L>  /  AlkPhos  45            Urinalysis Basic - ( 21 May 2019 18:55 )    Color: Yellow / Appearance: Clear / S.030 / pH: x  Gluc: x / Ketone: Small  / Bili: Negative / Urobili: 3 mg/dL   Blood: x / Protein: 100 / Nitrite: Negative   Leuk Esterase: Moderate / RBC: 24 /hpf / WBC 28 /HPF   Sq Epi: x / Non Sq Epi: 8 /hpf / Bacteria: Negative        RADIOLOGY & ADDITIONAL TESTS:     MICROBIOLOGY:    ANTIMICROBIALS:    CONSULTS: Patient is a 23y old  Female who presents with a chief complaint of fever (22 May 2019 01:29)      SUBJECTIVE / OVERNIGHT EVENTS:    No acute events overnight. Pt reported that her abdominal pain has improved and she is able to tolerate PO intake. She continues to endorse low grade fever and chills. However, denies any nausea, vomiting, chest pain, SOB, dysuria, diarrhea, constipation, skin changes, or LE edema.       MEDICATIONS  (STANDING):  cefepime   IVPB 2000 milliGRAM(s) IV Intermittent every 8 hours  enoxaparin Injectable 40 milliGRAM(s) SubCutaneous daily    MEDICATIONS  (PRN):      Vital Signs Last 24 Hrs  T(C): 36.4 (22 May 2019 06:44), Max: 38.3 (21 May 2019 21:42)  T(F): 97.6 (22 May 2019 06:44), Max: 101 (21 May 2019 21:42)  HR: 75 (22 May 2019 06:44) (75 - 110)  BP: 92/61 (22 May 2019 06:44) (92/61 - 101/68)  BP(mean): --  RR: 18 (22 May 2019 06:44) (18 - 19)  SpO2: 100% (22 May 2019 06:44) (99% - 100%)      PHYSICAL EXAM  GENERAL: NAD, well-developed  HEAD:  Atraumatic, Normocephalic  EYES: Conjunctiva and sclera clear  NECK: Supple, No JVD  CHEST/LUNG: Clear to auscultation bilaterally; No wheeze  HEART: Regular rate and rhythm; No murmurs, rubs, or gallops  ABDOMEN: Soft, Nontender, Nondistended; Bowel sounds present  EXTREMITIES:  2+ Peripheral Pulses, No clubbing, cyanosis, or edema      CAPILLARY BLOOD GLUCOSE        I&O's Summary    21 May 2019 07:01  -  22 May 2019 07:00  --------------------------------------------------------  IN: 1250 mL / OUT: 0 mL / NET: 1250 mL        LABS:                        9.2    1.8   )-----------( 107      ( 22 May 2019 07:10 )             28.7     05-    138  |  105  |  11  ----------------------------<  95  4.4   |  26  |  0.64    Ca    8.0<L>      22 May 2019 07:09  Phos  4.0       Mg     2.4         TPro  7.6  /  Alb  2.5<L>  /  TBili  0.2  /  DBili  x   /  AST  26  /  ALT  6<L>  /  AlkPhos  45            Urinalysis Basic - ( 21 May 2019 18:55 )    Color: Yellow / Appearance: Clear / S.030 / pH: x  Gluc: x / Ketone: Small  / Bili: Negative / Urobili: 3 mg/dL   Blood: x / Protein: 100 / Nitrite: Negative   Leuk Esterase: Moderate / RBC: 24 /hpf / WBC 28 /HPF   Sq Epi: x / Non Sq Epi: 8 /hpf / Bacteria: Negative        RADIOLOGY & ADDITIONAL TESTS:     MICROBIOLOGY:    ANTIMICROBIALS:    CONSULTS:

## 2019-05-22 NOTE — H&P ADULT - PROBLEM SELECTOR PLAN 1
Unclear: infectious process vs SLE flare  ESR/CRP elevated could be seen in both the processes mentioned above  ANC 1600 with pancytopenia, elev fevers concerning for infection: UA neg for bacteria & no urinary sxs, obtain CXR in AM, f/u Bcx x 2, Ucx, mononucleosis screen, given borderline ANC will continue cefepime for now, can deescalate therapy if no infectious etiology elucidated  fever could be due to SLE flare, mentioned she has high grade fevers at time of flare up, pancytopenia, elev ESR, CRP support flare, f/u C3, C4, DsDNA DDX: pancreatitis, gastritis, mesenteric ischemia, malignancy  check lipase in AM  per pt upper endoscopy was negative and US abdomen showed benign liver lesion, given fevers warrants CT abdomen/Pelvis  can do trial of maalox

## 2019-05-22 NOTE — H&P ADULT - HISTORY OF PRESENT ILLNESS
23 year old female with pmhx of SlE (dx 2017) referred to ED by outpatient lab for Emilia elizalde in Bcx x 1. Per pt she has been experiencing subjective fevers for 1 week associated with chills, LUQ abdominal pain, b/l hand swelling and pain, R. ankle and R. and L. knee pain and R. wrist pain. She recently underwent immune system evaluation with AI given she has frequent high fevers w/ possible infection or SLE flare. The panel was significant for low normal ABS CD4, polyclonal gammopathy of unclear significance. Pt mentioned LUQ abdominal pain that is worst with food intake, its sharp and non radiating. She has been undergoing evaluation for prolonged abdominal pain as well that included recent upper endoscopy at [facility] which was negative per pt, abdominal US which showed "lesion" per pt and was given a referral for MR abdomen for further evaluation of lesion. Of note she is not on any immunomodulator or immunosuppressions for SLE, she said she self dc'ed Plaquenil in Jan 2019 after her SLE flare since she had multiple recurrence of flare. She had an upcoming pola with her rheumatologist next week and was going to discuss other options. Denied recent travel or sick contact. Denied cough, urinary sxs, diarrhea/constipation, oral ulcers, dry eyes, hair loss, rash, hemoptysis, N/V, HA, nuchal rigidity, visual changes, vaginal discharge. She does endorse weight loss due to reduced appetite.     In the ED Vitals Tmax 101 oral  BP 98/65 RR 18 SaO2 100 % on RA  Received 1L NS, ceftriaxone, IV tylenol and PO tylenol 23 year old female with pmhx of SlE (dx 2017) referred to ED by outpatient lab for Emilia elizalde in Bcx x 1. Per pt she has been experiencing subjective fevers for 1 week associated with chills, LUQ abdominal pain, b/l hand swelling and pain, R. ankle and R. and L. knee pain and R. wrist pain. She recently underwent immune system evaluation with AI given she has frequent high fevers w/ possible infection or SLE flare. The panel was significant for low normal ABS CD4, polyclonal gammopathy of unclear significance. Pt mentioned LUQ abdominal pain that is worst with food intake, its sharp and non radiating. She has been undergoing evaluation for prolonged abdominal pain as well that included recent upper endoscopy at [facility] which was negative per pt, abdominal US which showed "lesion" on liver that was benign per pt and was given a referral for MR abdomen for further evaluation of lesion. Of note she is not on any immunomodulator or immunosuppressions for SLE, she said she self dc'ed Plaquenil in Jan 2019 after her SLE flare since she had multiple recurrence of flare. She takes occasional motrin and tylenol. She had an upcoming pola with her rheumatologist next week and was going to discuss other options. Denied recent travel or sick contact. Denied cough, urinary sxs, diarrhea/constipation, oral ulcers, dry eyes, hair loss, rash, hemoptysis, N/V, HA, nuchal rigidity, visual changes, vaginal discharge. She does endorse weight loss due to reduced appetite.     In the ED Vitals Tmax 101 oral  BP 98/65 RR 18 SaO2 100 % on RA  Received 1L NS, ceftriaxone, IV tylenol and PO tylenol

## 2019-05-22 NOTE — PROGRESS NOTE ADULT - PROBLEM SELECTOR PLAN 5
DVT ppx: lovenox subq  Diet: Regular diet  Barby Levin PGY-2  Pager # [phone]/ [phone] DVT ppx: lovenox subq  Diet: Regular diet

## 2019-05-22 NOTE — H&P ADULT - PROBLEM SELECTOR PLAN 3
currently not on immunomodulator or immunosuppressant  given concern for infection, hold off on high dose steroids for flare  once infection has been definitively ruled out consider prednisone taper  f/u C3, C4, DsDNA  rheum consult in AM

## 2019-05-23 LAB
ALBUMIN SERPL ELPH-MCNC: 2.7 G/DL — LOW (ref 3.3–5)
ALP SERPL-CCNC: 49 U/L — SIGNIFICANT CHANGE UP (ref 40–120)
ALT FLD-CCNC: 7 U/L — LOW (ref 10–45)
ANION GAP SERPL CALC-SCNC: 6 MMOL/L — SIGNIFICANT CHANGE UP (ref 5–17)
AST SERPL-CCNC: 26 U/L — SIGNIFICANT CHANGE UP (ref 10–40)
B19V IGG SER-ACNC: 0.4 INDEX — SIGNIFICANT CHANGE UP (ref 0–0.8)
B19V IGG+IGM SER-IMP: NEGATIVE — SIGNIFICANT CHANGE UP
B19V IGG+IGM SER-IMP: SIGNIFICANT CHANGE UP
B19V IGM FLD-ACNC: 0.2 INDEX — SIGNIFICANT CHANGE UP (ref 0–0.8)
B19V IGM SER-ACNC: NEGATIVE — SIGNIFICANT CHANGE UP
BASOPHILS # BLD AUTO: SIGNIFICANT CHANGE UP (ref 0–0.2)
BILIRUB SERPL-MCNC: 0.2 MG/DL — SIGNIFICANT CHANGE UP (ref 0.2–1.2)
BUN SERPL-MCNC: 6 MG/DL — LOW (ref 7–23)
CALCIUM SERPL-MCNC: 8.1 MG/DL — LOW (ref 8.4–10.5)
CHLORIDE SERPL-SCNC: 108 MMOL/L — SIGNIFICANT CHANGE UP (ref 96–108)
CK SERPL-CCNC: 28 U/L — SIGNIFICANT CHANGE UP (ref 25–170)
CMV IGM FLD-ACNC: 67.5 AU/ML — HIGH
CMV IGM SERPL QL: POSITIVE
CO2 SERPL-SCNC: 25 MMOL/L — SIGNIFICANT CHANGE UP (ref 22–31)
CREAT SERPL-MCNC: 0.6 MG/DL — SIGNIFICANT CHANGE UP (ref 0.5–1.3)
DRVVT SCREEN TO CONFIRM RATIO: SIGNIFICANT CHANGE UP
EBV EA AB SER IA-ACNC: >150 U/ML — HIGH
EBV EA AB TITR SER IF: POSITIVE
EBV EA IGG SER-ACNC: POSITIVE
EBV NA IGG SER IA-ACNC: >600 U/ML — HIGH
EBV PATRN SPEC IB-IMP: SIGNIFICANT CHANGE UP
EBV VCA IGG AVIDITY SER QL IA: POSITIVE
EBV VCA IGM SER IA-ACNC: 14.8 U/ML — SIGNIFICANT CHANGE UP
EBV VCA IGM SER IA-ACNC: 436 U/ML — HIGH
EBV VCA IGM TITR FLD: NEGATIVE — SIGNIFICANT CHANGE UP
EOSINOPHIL # BLD AUTO: 0.1 K/UL — SIGNIFICANT CHANGE UP (ref 0–0.5)
EOSINOPHIL NFR BLD AUTO: 3 % — SIGNIFICANT CHANGE UP (ref 0–6)
FERRITIN SERPL-MCNC: 225 NG/ML — HIGH (ref 15–150)
GLUCOSE SERPL-MCNC: 102 MG/DL — HIGH (ref 70–99)
HAPTOGLOB SERPL-MCNC: 197 MG/DL — SIGNIFICANT CHANGE UP (ref 34–200)
HCT VFR BLD CALC: 28.9 % — LOW (ref 34.5–45)
HGB BLD-MCNC: 9.3 G/DL — LOW (ref 11.5–15.5)
IRON SATN MFR SERPL: 19 % — SIGNIFICANT CHANGE UP (ref 14–50)
IRON SATN MFR SERPL: 36 UG/DL — SIGNIFICANT CHANGE UP (ref 30–160)
LA NT DPL PPP QL: 31.2 SEC — SIGNIFICANT CHANGE UP
LDH SERPL L TO P-CCNC: 264 U/L — HIGH (ref 50–242)
LYMPHOCYTES # BLD AUTO: 1 K/UL — SIGNIFICANT CHANGE UP (ref 1–3.3)
LYMPHOCYTES # BLD AUTO: 38 % — SIGNIFICANT CHANGE UP (ref 13–44)
MCHC RBC-ENTMCNC: 29.1 PG — SIGNIFICANT CHANGE UP (ref 27–34)
MCHC RBC-ENTMCNC: 32.2 GM/DL — SIGNIFICANT CHANGE UP (ref 32–36)
MCV RBC AUTO: 90.5 FL — SIGNIFICANT CHANGE UP (ref 80–100)
MONOCYTES # BLD AUTO: 0.2 K/UL — SIGNIFICANT CHANGE UP (ref 0–0.9)
MONOCYTES NFR BLD AUTO: 3 % — SIGNIFICANT CHANGE UP (ref 2–14)
NEUTROPHILS # BLD AUTO: 0.9 K/UL — LOW (ref 1.8–7.4)
NEUTROPHILS NFR BLD AUTO: 54 % — SIGNIFICANT CHANGE UP (ref 43–77)
PLATELET # BLD AUTO: 127 K/UL — LOW (ref 150–400)
POTASSIUM SERPL-MCNC: 3.8 MMOL/L — SIGNIFICANT CHANGE UP (ref 3.5–5.3)
POTASSIUM SERPL-SCNC: 3.8 MMOL/L — SIGNIFICANT CHANGE UP (ref 3.5–5.3)
PROT SERPL-MCNC: 7.6 G/DL — SIGNIFICANT CHANGE UP (ref 6–8.3)
RBC # BLD: 3.19 M/UL — LOW (ref 3.8–5.2)
RBC # BLD: 3.19 M/UL — LOW (ref 3.8–5.2)
RBC # FLD: 12.2 % — SIGNIFICANT CHANGE UP (ref 10.3–14.5)
RETICS #: 18.4 K/UL — LOW (ref 25–125)
RETICS/RBC NFR: 0.6 % — SIGNIFICANT CHANGE UP (ref 0.5–2.5)
SODIUM SERPL-SCNC: 139 MMOL/L — SIGNIFICANT CHANGE UP (ref 135–145)
TIBC SERPL-MCNC: 191 UG/DL — LOW (ref 220–430)
TRIGL SERPL-MCNC: 137 MG/DL — SIGNIFICANT CHANGE UP (ref 10–149)
UIBC SERPL-MCNC: 155 UG/DL — SIGNIFICANT CHANGE UP (ref 110–370)
WBC # BLD: 2.3 K/UL — LOW (ref 3.8–10.5)
WBC # FLD AUTO: 2.3 K/UL — LOW (ref 3.8–10.5)

## 2019-05-23 PROCEDURE — 99232 SBSQ HOSP IP/OBS MODERATE 35: CPT | Mod: GC

## 2019-05-23 PROCEDURE — 99232 SBSQ HOSP IP/OBS MODERATE 35: CPT

## 2019-05-23 RX ORDER — CAPTOPRIL 12.5 MG/1
12.5 TABLET ORAL ONCE
Refills: 0 | Status: DISCONTINUED | OUTPATIENT
Start: 2019-05-23 | End: 2019-05-23

## 2019-05-23 RX ORDER — HYDROXYCHLOROQUINE SULFATE 200 MG
200 TABLET ORAL DAILY
Refills: 0 | Status: DISCONTINUED | OUTPATIENT
Start: 2019-05-23 | End: 2019-05-24

## 2019-05-23 RX ADMIN — CEFEPIME 100 MILLIGRAM(S): 1 INJECTION, POWDER, FOR SOLUTION INTRAMUSCULAR; INTRAVENOUS at 05:20

## 2019-05-23 RX ADMIN — CEFEPIME 100 MILLIGRAM(S): 1 INJECTION, POWDER, FOR SOLUTION INTRAMUSCULAR; INTRAVENOUS at 13:11

## 2019-05-23 RX ADMIN — CEFEPIME 100 MILLIGRAM(S): 1 INJECTION, POWDER, FOR SOLUTION INTRAMUSCULAR; INTRAVENOUS at 22:26

## 2019-05-23 RX ADMIN — Medication 20 MILLIGRAM(S): at 13:10

## 2019-05-23 NOTE — PROGRESS NOTE ADULT - SUBJECTIVE AND OBJECTIVE BOX
SAVANA WATKINS  [id number]    INTERVAL HPI/OVERNIGHT EVENTS:  No acute events overnight. Pt afebrile, feeling better. Has improvement in fatigue and joint pains, has continued mild LLQ pain.    MEDICATIONS  (STANDING):  cefepime   IVPB 2000 milliGRAM(s) IV Intermittent every 8 hours  enoxaparin Injectable 40 milliGRAM(s) SubCutaneous daily  predniSONE   Tablet 5 milliGRAM(s) Oral daily    MEDICATIONS  (PRN):      Allergies    No Known Allergies    Intolerances        Review of Systems:   General: No fevers/chills, no fatigue  HEENT: No blurry vision, dysphagia, or odynophagia  CVS: No CP/palpitations  Resp: No SOB/wheezing  GI: No N/V/C/D, mild LLQ pain  MSK: no joint pains   Skin: No new rashes  Neuro: No headaches      Vital Signs Last 24 Hrs  T(C): 36.8 (23 May 2019 04:31), Max: 37.7 (22 May 2019 21:35)  T(F): 98.3 (23 May 2019 04:31), Max: 99.9 (22 May 2019 21:35)  HR: 75 (23 May 2019 04:31) (75 - 91)  BP: 104/70 (23 May 2019 04:31) (94/60 - 104/70)  BP(mean): --  RR: 18 (23 May 2019 04:31) (18 - 20)  SpO2: 100% (23 May 2019 04:31) (99% - 100%)    Physical Exam:  General: NAD  HEENT: EOMI, MMM  Cardio: +S1/S2, RRR  Resp: CTA b/l  GI: +BS, soft, mild LLQ tenderness, no rebound or guarding   MSK: Joint exam wnl   Neuro: AAOx3  Psych: wnl    LABS:                        9.3    2.3   )-----------( 127      ( 23 May 2019 09:24 )             28.9     05-23    139  |  108  |  6<L>  ----------------------------<  102<H>  3.8   |  25  |  0.60    Ca    8.1<L>      23 May 2019 09:24  Phos  4.0     05-22  Mg     2.4     05-22    TPro  7.6  /  Alb  2.7<L>  /  TBili  0.2  /  DBili  x   /  AST  26  /  ALT  7<L>  /  AlkPhos  49  05-23      Urinalysis Basic - ( 21 May 2019 18:55 )    Color: Yellow / Appearance: Clear / S.030 / pH: x  Gluc: x / Ketone: Small  / Bili: Negative / Urobili: 3 mg/dL   Blood: x / Protein: 100 / Nitrite: Negative   Leuk Esterase: Moderate / RBC: 24 /hpf / WBC 28 /HPF   Sq Epi: x / Non Sq Epi: 8 /hpf / Bacteria: Negative    Anti-Nuclear Antibody (18 @ 23:37)    Anti Nuclear Factor Titer: Negative    Double Stranded DNA Antibody (18 @ 23:37)    Double Stranded DNA Antibody: 655: Method: EIA            Reference Ranges            Interpretation            < 30      IU/mL     Negative            30 - 75  IU/mL     Borderline            > 75      IU/mL     Positive IU/mL    [facility] Antibody Screening Test (17 @ 14:38)    SM (Louise) Ab FBIA: >8.0 AI    Anti-Ribonuclear Protein: 6.6: Fluorescent Bead Immunoassy                      Reference Ranges for RNP and SM:                      <1.0 AI (negative)                      > or =1.0 AI (positive)                      Reference values apply to all ages AI    Sjogren&#x27;s Syndrome Antibodies (17 @ 23:31)    Anti SS-A Antibody: >8.0 AI    Anti SS-B Antibody: 1.2: Fluorescent Bead Immunoassay   Reference Ranges for SS-A AND SS-B:   <1.0 AI (negative)   > or =1.0 AI (positive)   Reference values apply  to all ages. AI      C3 Complement, Serum (19 @ 10:10)    C3 Complement, Serum: 50 mg/dL    C4 Complement, Serum (19 @ 10:10)    C4 Complement, Serum: 6 mg/dL    Ferritin, Serum (18 @ 10:24)    Ferritin, Serum: 380.0 ng/mL    Triglycerides, Serum in AM (19 @ 09:24)    Triglycerides, Serum: 137 mg/dL    Creatine Kinase, Serum in AM (19 @ 09:24)    Creatine Kinase, Serum: 28 U/L      Culture - Urine (19 @ 00:19)    Specimen Source: .Urine    Culture Results:   <10,000 CFU/mL Normal Urogenital Katelyn    Culture - Blood (19 @ 00:30)    Specimen Source: .Blood    Culture Results:   No growth to date.          RADIOLOGY & ADDITIONAL STUDIES:  < from: CT Abdomen and Pelvis No Cont (19 @ 08:38) >  IMPRESSION:   Limited evaluation secondary to lack of contrast administration.    Left basilar opacity, likely early pneumonia.    No evidence of acute intra-abdominal process.    < end of copied text >        RADIOLOGY & ADDITIONAL TESTS: SAVANA WATKINS  [id number]    INTERVAL HPI/OVERNIGHT EVENTS:  No acute events overnight. Pt afebrile, feeling better. Has improvement in fatigue and joint pains, has continued mild LLQ pain.    MEDICATIONS  (STANDING):  cefepime   IVPB 2000 milliGRAM(s) IV Intermittent every 8 hours  enoxaparin Injectable 40 milliGRAM(s) SubCutaneous daily  predniSONE   Tablet 5 milliGRAM(s) Oral daily    MEDICATIONS  (PRN):      Allergies    No Known Allergies    Intolerances        Review of Systems:   General: No fevers/chills, no fatigue  HEENT: No blurry vision, dysphagia, or odynophagia  CVS: No CP/palpitations  Resp: No SOB/wheezing  GI: No N/V/C/D, mild LLQ pain  MSK: no joint pains   Skin: No new rashes  Neuro: No headaches      Vital Signs Last 24 Hrs  T(C): 36.8 (23 May 2019 04:31), Max: 37.7 (22 May 2019 21:35)  T(F): 98.3 (23 May 2019 04:31), Max: 99.9 (22 May 2019 21:35)  HR: 75 (23 May 2019 04:31) (75 - 91)  BP: 104/70 (23 May 2019 04:31) (94/60 - 104/70)  BP(mean): --  RR: 18 (23 May 2019 04:31) (18 - 20)  SpO2: 100% (23 May 2019 04:31) (99% - 100%)    Physical Exam:  General: NAD  HEENT: EOMI, MMM  Cardio: +S1/S2, RRR  Resp: CTA b/l  GI: +BS, soft, mild LLQ tenderness, no rebound or guarding   MSK:  left ankle with improved but still slightly reduced TT rom.  NT, cool and less swelling.    Neuro: AAOx3  Psych: wnl    LABS:                        9.3    2.3   )-----------( 127      ( 23 May 2019 09:24 )             28.9     05-23    139  |  108  |  6<L>  ----------------------------<  102<H>  3.8   |  25  |  0.60    Ca    8.1<L>      23 May 2019 09:24  Phos  4.0     05-22  Mg     2.4         TPro  7.6  /  Alb  2.7<L>  /  TBili  0.2  /  DBili  x   /  AST  26  /  ALT  7<L>  /  AlkPhos  49        Urinalysis Basic - ( 21 May 2019 18:55 )    Color: Yellow / Appearance: Clear / S.030 / pH: x  Gluc: x / Ketone: Small  / Bili: Negative / Urobili: 3 mg/dL   Blood: x / Protein: 100 / Nitrite: Negative   Leuk Esterase: Moderate / RBC: 24 /hpf / WBC 28 /HPF   Sq Epi: x / Non Sq Epi: 8 /hpf / Bacteria: Negative    Anti-Nuclear Antibody (18 @ 23:37)    Anti Nuclear Factor Titer: Negative    Double Stranded DNA Antibody (18 @ 23:37)    Double Stranded DNA Antibody: 655: Method: EIA            Reference Ranges            Interpretation            < 30      IU/mL     Negative            30 - 75  IU/mL     Borderline            > 75      IU/mL     Positive IU/mL    [facility] Antibody Screening Test (17 @ 14:38)    SM (Oluise) Ab FBIA: >8.0 AI    Anti-Ribonuclear Protein: 6.6: Fluorescent Bead Immunoassy                      Reference Ranges for RNP and SM:                      <1.0 AI (negative)                      > or =1.0 AI (positive)                      Reference values apply to all ages AI    Sjogren&#x27;s Syndrome Antibodies (17 @ 23:31)    Anti SS-A Antibody: >8.0 AI    Anti SS-B Antibody: 1.2: Fluorescent Bead Immunoassay   Reference Ranges for SS-A AND SS-B:   <1.0 AI (negative)   > or =1.0 AI (positive)   Reference values apply  to all ages. AI      C3 Complement, Serum (19 @ 10:10)    C3 Complement, Serum: 50 mg/dL    C4 Complement, Serum (19 @ 10:10)    C4 Complement, Serum: 6 mg/dL    Ferritin, Serum (18 @ 10:24)    Ferritin, Serum: 380.0 ng/mL    Triglycerides, Serum in AM (19 @ 09:24)    Triglycerides, Serum: 137 mg/dL    Creatine Kinase, Serum in AM (19 @ 09:24)    Creatine Kinase, Serum: 28 U/L      Culture - Urine (19 @ 00:19)    Specimen Source: .Urine    Culture Results:   <10,000 CFU/mL Normal Urogenital Katelyn    Culture - Blood (19 @ 00:30)    Specimen Source: .Blood    Culture Results:   No growth to date.          RADIOLOGY & ADDITIONAL STUDIES:  < from: CT Abdomen and Pelvis No Cont (19 @ 08:38) >  IMPRESSION:   Limited evaluation secondary to lack of contrast administration.    Left basilar opacity, likely early pneumonia.    No evidence of acute intra-abdominal process.    < end of copied text >        RADIOLOGY & ADDITIONAL TESTS:

## 2019-05-23 NOTE — PROGRESS NOTE ADULT - PROBLEM SELECTOR PLAN 3
Decrease in WBC, Hgb, platelets likely in setting of SLE flare or infectious etiology   -ANC <1500 with fever and as a result, started on cefepime   -Active type and screen  -Urine and blood culture negative. Initially decresed WBC, Hgb, platelets likely in setting of SLE flare or infectious etiology   -Gradually improving   -C/w cefepime for now as patient is immunocompromised   -Trend CBC and fever curve daily. Initially decreased WBC, Hgb, platelets likely in setting of SLE flare or infectious etiology   -Gradually improving   -C/w cefepime for now as patient is immunocompromised   -Trend CBC and fever curve daily.

## 2019-05-23 NOTE — PROGRESS NOTE ADULT - SUBJECTIVE AND OBJECTIVE BOX
Patient is a 23y old  Female who presents with a chief complaint of fever (22 May 2019 20:29)      SUBJECTIVE / OVERNIGHT EVENTS:          MEDICATIONS  (STANDING):  cefepime   IVPB 2000 milliGRAM(s) IV Intermittent every 8 hours  enoxaparin Injectable 40 milliGRAM(s) SubCutaneous daily  predniSONE   Tablet 5 milliGRAM(s) Oral daily    MEDICATIONS  (PRN):      Vital Signs Last 24 Hrs  T(C): 36.8 (23 May 2019 04:31), Max: 37.7 (22 May 2019 21:35)  T(F): 98.3 (23 May 2019 04:31), Max: 99.9 (22 May 2019 21:35)  HR: 75 (23 May 2019 04:31) (71 - 91)  BP: 104/70 (23 May 2019 04:31) (94/60 - 104/70)  BP(mean): --  RR: 18 (23 May 2019 04:31) (18 - 20)  SpO2: 100% (23 May 2019 04:31) (99% - 100%)      PHYSICAL EXAM  GENERAL: NAD, well-developed  HEAD:  Atraumatic, Normocephalic  EYES: EOMI, PERRLA, conjunctiva and sclera clear  NECK: Supple, No JVD  CHEST/LUNG: Clear to auscultation bilaterally; No wheeze  HEART: Regular rate and rhythm; No murmurs, rubs, or gallops  ABDOMEN: Soft, Nontender, Nondistended; Bowel sounds present  EXTREMITIES:  2+ Peripheral Pulses, No clubbing, cyanosis, or edema  PSYCH: AAOx3  SKIN: No rashes or lesions    CAPILLARY BLOOD GLUCOSE        I&O's Summary    22 May 2019 07:01  -  23 May 2019 07:00  --------------------------------------------------------  IN: 920 mL / OUT: 300 mL / NET: 620 mL        LABS:                        9.2    1.8   )-----------( 107      ( 22 May 2019 07:10 )             28.7     05-22    138  |  105  |  11  ----------------------------<  95  4.4   |  26  |  0.64    Ca    8.0<L>      22 May 2019 07:09  Phos  4.0     -  Mg     2.4     -    TPro  7.6  /  Alb  2.5<L>  /  TBili  0.2  /  DBili  x   /  AST  26  /  ALT  6<L>  /  AlkPhos  45  -          Urinalysis Basic - ( 21 May 2019 18:55 )    Color: Yellow / Appearance: Clear / S.030 / pH: x  Gluc: x / Ketone: Small  / Bili: Negative / Urobili: 3 mg/dL   Blood: x / Protein: 100 / Nitrite: Negative   Leuk Esterase: Moderate / RBC: 24 /hpf / WBC 28 /HPF   Sq Epi: x / Non Sq Epi: 8 /hpf / Bacteria: Negative        RADIOLOGY & ADDITIONAL TESTS:     MICROBIOLOGY:    ANTIMICROBIALS:    CONSULTS: Patient is a 23y old  Female who presents with a chief complaint of fever (22 May 2019 20:29)      SUBJECTIVE / OVERNIGHT EVENTS:    No acute events overnight. Pt reported to have persistent abdominal pain that worsens with food. She also has not had a BM yet         MEDICATIONS  (STANDING):  cefepime   IVPB 2000 milliGRAM(s) IV Intermittent every 8 hours  enoxaparin Injectable 40 milliGRAM(s) SubCutaneous daily  predniSONE   Tablet 5 milliGRAM(s) Oral daily    MEDICATIONS  (PRN):      Vital Signs Last 24 Hrs  T(C): 36.8 (23 May 2019 04:31), Max: 37.7 (22 May 2019 21:35)  T(F): 98.3 (23 May 2019 04:31), Max: 99.9 (22 May 2019 21:35)  HR: 75 (23 May 2019 04:31) (71 - 91)  BP: 104/70 (23 May 2019 04:31) (94/60 - 104/70)  BP(mean): --  RR: 18 (23 May 2019 04:31) (18 - 20)  SpO2: 100% (23 May 2019 04:31) (99% - 100%)      PHYSICAL EXAM  GENERAL: NAD, well-developed  HEAD:  Atraumatic, Normocephalic  EYES: EOMI, PERRLA, conjunctiva and sclera clear  NECK: Supple, No JVD  CHEST/LUNG: Clear to auscultation bilaterally; No wheeze  HEART: Regular rate and rhythm; No murmurs, rubs, or gallops  ABDOMEN: Soft, Nontender, Nondistended; Bowel sounds present  EXTREMITIES:  2+ Peripheral Pulses, No clubbing, cyanosis, or edema  PSYCH: AAOx3  SKIN: No rashes or lesions    CAPILLARY BLOOD GLUCOSE        I&O's Summary    22 May 2019 07:01  -  23 May 2019 07:00  --------------------------------------------------------  IN: 920 mL / OUT: 300 mL / NET: 620 mL        LABS:                        9.2    1.8   )-----------( 107      ( 22 May 2019 07:10 )             28.7     05-22    138  |  105  |  11  ----------------------------<  95  4.4   |  26  |  0.64    Ca    8.0<L>      22 May 2019 07:09  Phos  4.0       Mg     2.4         TPro  7.6  /  Alb  2.5<L>  /  TBili  0.2  /  DBili  x   /  AST  26  /  ALT  6<L>  /  AlkPhos  45            Urinalysis Basic - ( 21 May 2019 18:55 )    Color: Yellow / Appearance: Clear / S.030 / pH: x  Gluc: x / Ketone: Small  / Bili: Negative / Urobili: 3 mg/dL   Blood: x / Protein: 100 / Nitrite: Negative   Leuk Esterase: Moderate / RBC: 24 /hpf / WBC 28 /HPF   Sq Epi: x / Non Sq Epi: 8 /hpf / Bacteria: Negative        RADIOLOGY & ADDITIONAL TESTS:     MICROBIOLOGY:    ANTIMICROBIALS:    CONSULTS: Patient is a 23y old  Female who presents with a chief complaint of fever (22 May 2019 20:29)      SUBJECTIVE / OVERNIGHT EVENTS:    No acute events overnight. Pt reported to have persistent abdominal pain that worsens with food. She also has not had a BM yet because she does not feel comfortable going to the bathroom. Denies fever, chills, nausea, vomiting,  CP, palpitations, SOB, or LE edema.         MEDICATIONS  (STANDING):  cefepime   IVPB 2000 milliGRAM(s) IV Intermittent every 8 hours  enoxaparin Injectable 40 milliGRAM(s) SubCutaneous daily  predniSONE   Tablet 5 milliGRAM(s) Oral daily    MEDICATIONS  (PRN):      Vital Signs Last 24 Hrs  T(C): 36.8 (23 May 2019 04:31), Max: 37.7 (22 May 2019 21:35)  T(F): 98.3 (23 May 2019 04:31), Max: 99.9 (22 May 2019 21:35)  HR: 75 (23 May 2019 04:31) (71 - 91)  BP: 104/70 (23 May 2019 04:31) (94/60 - 104/70)  BP(mean): --  RR: 18 (23 May 2019 04:31) (18 - 20)  SpO2: 100% (23 May 2019 04:31) (99% - 100%)      PHYSICAL EXAM  GENERAL: NAD, well-developed  HEAD:  Atraumatic, Normocephalic  EYES: EOMI, PERRLA, conjunctiva and sclera clear  NECK: Supple, No JVD  CHEST/LUNG: Clear to auscultation bilaterally; No wheeze  HEART: Regular rate and rhythm; No murmurs, rubs, or gallops  ABDOMEN: Soft, Nontender, Nondistended; Bowel sounds present  EXTREMITIES:  2+ Peripheral Pulses, No clubbing, cyanosis, or edema  PSYCH: AAOx3  SKIN: No rashes or lesions    CAPILLARY BLOOD GLUCOSE        I&O's Summary    22 May 2019 07:01  -  23 May 2019 07:00  --------------------------------------------------------  IN: 920 mL / OUT: 300 mL / NET: 620 mL        LABS:                        9.2    1.8   )-----------( 107      ( 22 May 2019 07:10 )             28.7         138  |  105  |  11  ----------------------------<  95  4.4   |  26  |  0.64    Ca    8.0<L>      22 May 2019 07:09  Phos  4.0       Mg     2.4         TPro  7.6  /  Alb  2.5<L>  /  TBili  0.2  /  DBili  x   /  AST  26  /  ALT  6<L>  /  AlkPhos  45            Urinalysis Basic - ( 21 May 2019 18:55 )    Color: Yellow / Appearance: Clear / S.030 / pH: x  Gluc: x / Ketone: Small  / Bili: Negative / Urobili: 3 mg/dL   Blood: x / Protein: 100 / Nitrite: Negative   Leuk Esterase: Moderate / RBC: 24 /hpf / WBC 28 /HPF   Sq Epi: x / Non Sq Epi: 8 /hpf / Bacteria: Negative        RADIOLOGY & ADDITIONAL TESTS:     MICROBIOLOGY:    ANTIMICROBIALS:    CONSULTS:

## 2019-05-23 NOTE — PROGRESS NOTE ADULT - PROBLEM SELECTOR PLAN 1
Most likely due to gastritis, cholecystitis, pancreatitis or constipation,   -CT A/P showed no acute finding   -Outpatient MRI showed hepatic lesion. EGD was negative for any acute finding as well.   -Continue with maalox and supportive management. Most likely due to gastritis, cholecystitis, pancreatitis or constipation,   -CT A/P showed no acute finding   -Outpatient US showed hepatic lesion. EGD was negative for any acute finding as well.   -MRI of the abdomen w/wo contrast to further assess the hepatic lesion.

## 2019-05-23 NOTE — PROGRESS NOTE ADULT - PROBLEM SELECTOR PLAN 2
Possibly due to infectious process or SLE flare  -ESR/CRP elevated  -F/w C3,C4, KENAN, Ds DNA, EBV, CMV, parainfluenza virus, mononucleosis, and Bcx. C3 and C4 level decreased, DsDNA pending   -Rheumatology consulted. Started on prednisone and Plaquenil to treat for SLE flare.  -CMV IgM came back positive, pending PCR   -outpatient follow up for further management C3 and C4 level decreased, DsDNA pending   -Rheumatology consulted. Started on prednisone and Plaquenil to treat for SLE flare.  -F/u with dsDNA, ferritin, APS labs and iron studies  -CMV IgM came back positive, pending PCR   -outpatient follow up for further management

## 2019-05-23 NOTE — PROVIDER CONTACT NOTE (MEDICATION) - ACTION/TREATMENT ORDERED:
Pt Educated by RN about compliance with medications for SLE and importance of DVT prophylaxis.  MD also came to bedside to educate and discuss with patient. Patient still refusing

## 2019-05-23 NOTE — PROVIDER CONTACT NOTE (MEDICATION) - ASSESSMENT
Pt alert and oriented times 4, no family present at bedside. No current C/O pain. Afternoon VS WNL.  Pt refusing Plaquenil and Lovenox during afternoon meds

## 2019-05-23 NOTE — PROGRESS NOTE ADULT - ASSESSMENT
23yoF hx of SLE (dx 2017, +Sm +SSB +dsDNA, low complements arthritis rash leukopenia) presenting with fever and body pains and +Blood cultures from PMD Office    Pt febrile, pancytopenic, hypocomplementemic w/ arthralgias +RBCs and WBCs in urine, P/C 0.4. Her symptoms can be c/w SLE flare- pt is not on any therapy for SLE at this time- stopped Plaquenil months ago.   Can raise concern for HLH however pt is now improved with reduction of fevers, normal TG, ferritin pending.   Repeat blood cultures negative, pt now afebrile, improved arthralgias- would like to go home     - Will f/u dsDNA, ferritin, APS labs and iron studies  - would start Prednisone 20mg qd to treat SLE flare  - Restart Plaquenil 200mg qd   - Pt will need further DMARD therapy as outpt for better control of her SLE, possibly Benlysta. Spoke to pt about importance of restarting Plaquenil as it can reduce the number of SLE flares, reduce morbidity related to SLE and will also aid in her joint pains     Will discuss w/ her outpt rheumatologist Dr. Pacheco - pt to follow up with him within 1 week of discharge       Candace Levin MD  Pager [phone]

## 2019-05-23 NOTE — PROGRESS NOTE ADULT - PROBLEM SELECTOR PLAN 4
-currently not on immunomodulator or immunosuppressant  -given concern for infection, will hold off on prednisone   -C3, C4 and DsDNA pending   -Rheum consult pending DVT ppx: lovenox subq  Diet: Regular diet

## 2019-05-24 ENCOUNTER — TRANSCRIPTION ENCOUNTER (OUTPATIENT)
Age: 24
End: 2019-05-24

## 2019-05-24 VITALS
DIASTOLIC BLOOD PRESSURE: 62 MMHG | SYSTOLIC BLOOD PRESSURE: 101 MMHG | OXYGEN SATURATION: 100 % | TEMPERATURE: 97 F | HEART RATE: 69 BPM | RESPIRATION RATE: 18 BRPM

## 2019-05-24 LAB
ALBUMIN SERPL ELPH-MCNC: 2.9 G/DL — LOW (ref 3.3–5)
ALP SERPL-CCNC: 54 U/L — SIGNIFICANT CHANGE UP (ref 40–120)
ALT FLD-CCNC: 9 U/L — LOW (ref 10–45)
ANION GAP SERPL CALC-SCNC: 8 MMOL/L — SIGNIFICANT CHANGE UP (ref 5–17)
AST SERPL-CCNC: 28 U/L — SIGNIFICANT CHANGE UP (ref 10–40)
B2 GLYCOPROT1 AB SER QL: POSITIVE
B2 GLYCOPROT1 IGA SER QL: 36.8 SAU — HIGH
B2 GLYCOPROT1 IGG SER-ACNC: <5 SGU — SIGNIFICANT CHANGE UP
B2 GLYCOPROT1 IGM SER-ACNC: 8.5 SMU — SIGNIFICANT CHANGE UP
BASOPHILS # BLD AUTO: 0 K/UL — SIGNIFICANT CHANGE UP (ref 0–0.2)
BASOPHILS NFR BLD AUTO: 0 % — SIGNIFICANT CHANGE UP (ref 0–2)
BILIRUB SERPL-MCNC: 0.2 MG/DL — SIGNIFICANT CHANGE UP (ref 0.2–1.2)
BUN SERPL-MCNC: 10 MG/DL — SIGNIFICANT CHANGE UP (ref 7–23)
CALCIUM SERPL-MCNC: 8.7 MG/DL — SIGNIFICANT CHANGE UP (ref 8.4–10.5)
CARDIOLIPIN AB SER-ACNC: POSITIVE
CARDIOLIPIN IGM SER-MCNC: 10.9 MPL — SIGNIFICANT CHANGE UP (ref 0–12.5)
CARDIOLIPIN IGM SER-MCNC: 8.8 GPL — SIGNIFICANT CHANGE UP (ref 0–12.5)
CHLORIDE SERPL-SCNC: 106 MMOL/L — SIGNIFICANT CHANGE UP (ref 96–108)
CO2 SERPL-SCNC: 26 MMOL/L — SIGNIFICANT CHANGE UP (ref 22–31)
CREAT SERPL-MCNC: 0.52 MG/DL — SIGNIFICANT CHANGE UP (ref 0.5–1.3)
DEPRECATED CARDIOLIPIN IGA SER: 8 APL — SIGNIFICANT CHANGE UP (ref 0–12.5)
DSDNA AB SER-ACNC: >1000 IU/ML — HIGH
EOSINOPHIL # BLD AUTO: 0 K/UL — SIGNIFICANT CHANGE UP (ref 0–0.5)
EOSINOPHIL NFR BLD AUTO: 0.4 % — SIGNIFICANT CHANGE UP (ref 0–6)
GLUCOSE SERPL-MCNC: 100 MG/DL — HIGH (ref 70–99)
HCT VFR BLD CALC: 31.8 % — LOW (ref 34.5–45)
HGB BLD-MCNC: 10.3 G/DL — LOW (ref 11.5–15.5)
LYMPHOCYTES # BLD AUTO: 0.8 K/UL — LOW (ref 1–3.3)
LYMPHOCYTES # BLD AUTO: 29.7 % — SIGNIFICANT CHANGE UP (ref 13–44)
MAGNESIUM SERPL-MCNC: 2.3 MG/DL — SIGNIFICANT CHANGE UP (ref 1.6–2.6)
MCHC RBC-ENTMCNC: 29.4 PG — SIGNIFICANT CHANGE UP (ref 27–34)
MCHC RBC-ENTMCNC: 32.5 GM/DL — SIGNIFICANT CHANGE UP (ref 32–36)
MCV RBC AUTO: 90.6 FL — SIGNIFICANT CHANGE UP (ref 80–100)
MONOCYTES # BLD AUTO: 0.1 K/UL — SIGNIFICANT CHANGE UP (ref 0–0.9)
MONOCYTES NFR BLD AUTO: 4.9 % — SIGNIFICANT CHANGE UP (ref 2–14)
NEUTROPHILS # BLD AUTO: 1.8 K/UL — SIGNIFICANT CHANGE UP (ref 1.8–7.4)
NEUTROPHILS NFR BLD AUTO: 65 % — SIGNIFICANT CHANGE UP (ref 43–77)
PHOSPHATE SERPL-MCNC: 3.4 MG/DL — SIGNIFICANT CHANGE UP (ref 2.5–4.5)
PLATELET # BLD AUTO: 138 K/UL — LOW (ref 150–400)
POTASSIUM SERPL-MCNC: 4.5 MMOL/L — SIGNIFICANT CHANGE UP (ref 3.5–5.3)
POTASSIUM SERPL-SCNC: 4.5 MMOL/L — SIGNIFICANT CHANGE UP (ref 3.5–5.3)
PROT SERPL-MCNC: 8.5 G/DL — HIGH (ref 6–8.3)
RBC # BLD: 3.51 M/UL — LOW (ref 3.8–5.2)
RBC # FLD: 12.4 % — SIGNIFICANT CHANGE UP (ref 10.3–14.5)
SODIUM SERPL-SCNC: 140 MMOL/L — SIGNIFICANT CHANGE UP (ref 135–145)
WBC # BLD: 2.8 K/UL — LOW (ref 3.8–10.5)
WBC # FLD AUTO: 2.8 K/UL — LOW (ref 3.8–10.5)

## 2019-05-24 PROCEDURE — A9585: CPT

## 2019-05-24 PROCEDURE — 83605 ASSAY OF LACTIC ACID: CPT

## 2019-05-24 PROCEDURE — 85613 RUSSELL VIPER VENOM DILUTED: CPT

## 2019-05-24 PROCEDURE — 87086 URINE CULTURE/COLONY COUNT: CPT

## 2019-05-24 PROCEDURE — 83550 IRON BINDING TEST: CPT

## 2019-05-24 PROCEDURE — 86850 RBC ANTIBODY SCREEN: CPT

## 2019-05-24 PROCEDURE — 83615 LACTATE (LD) (LDH) ENZYME: CPT

## 2019-05-24 PROCEDURE — 81001 URINALYSIS AUTO W/SCOPE: CPT

## 2019-05-24 PROCEDURE — 86140 C-REACTIVE PROTEIN: CPT

## 2019-05-24 PROCEDURE — 99285 EMERGENCY DEPT VISIT HI MDM: CPT | Mod: 25

## 2019-05-24 PROCEDURE — 74182 MRI ABDOMEN W/CONTRAST: CPT

## 2019-05-24 PROCEDURE — 87040 BLOOD CULTURE FOR BACTERIA: CPT

## 2019-05-24 PROCEDURE — 86146 BETA-2 GLYCOPROTEIN ANTIBODY: CPT

## 2019-05-24 PROCEDURE — 84478 ASSAY OF TRIGLYCERIDES: CPT

## 2019-05-24 PROCEDURE — 83735 ASSAY OF MAGNESIUM: CPT

## 2019-05-24 PROCEDURE — 86665 EPSTEIN-BARR CAPSID VCA: CPT

## 2019-05-24 PROCEDURE — 85027 COMPLETE CBC AUTOMATED: CPT

## 2019-05-24 PROCEDURE — 93005 ELECTROCARDIOGRAM TRACING: CPT

## 2019-05-24 PROCEDURE — 86901 BLOOD TYPING SEROLOGIC RH(D): CPT

## 2019-05-24 PROCEDURE — 86663 EPSTEIN-BARR ANTIBODY: CPT

## 2019-05-24 PROCEDURE — 84702 CHORIONIC GONADOTROPIN TEST: CPT

## 2019-05-24 PROCEDURE — 83540 ASSAY OF IRON: CPT

## 2019-05-24 PROCEDURE — 83010 ASSAY OF HAPTOGLOBIN QUANT: CPT

## 2019-05-24 PROCEDURE — 86664 EPSTEIN-BARR NUCLEAR ANTIGEN: CPT

## 2019-05-24 PROCEDURE — 86308 HETEROPHILE ANTIBODY SCREEN: CPT

## 2019-05-24 PROCEDURE — 82570 ASSAY OF URINE CREATININE: CPT

## 2019-05-24 PROCEDURE — 87633 RESP VIRUS 12-25 TARGETS: CPT

## 2019-05-24 PROCEDURE — 86147 CARDIOLIPIN ANTIBODY EA IG: CPT

## 2019-05-24 PROCEDURE — 85045 AUTOMATED RETICULOCYTE COUNT: CPT

## 2019-05-24 PROCEDURE — 86645 CMV ANTIBODY IGM: CPT

## 2019-05-24 PROCEDURE — 82550 ASSAY OF CK (CPK): CPT

## 2019-05-24 PROCEDURE — 87798 DETECT AGENT NOS DNA AMP: CPT

## 2019-05-24 PROCEDURE — 82728 ASSAY OF FERRITIN: CPT

## 2019-05-24 PROCEDURE — 96374 THER/PROPH/DIAG INJ IV PUSH: CPT

## 2019-05-24 PROCEDURE — 84156 ASSAY OF PROTEIN URINE: CPT

## 2019-05-24 PROCEDURE — 80053 COMPREHEN METABOLIC PANEL: CPT

## 2019-05-24 PROCEDURE — 87581 M.PNEUMON DNA AMP PROBE: CPT

## 2019-05-24 PROCEDURE — 71045 X-RAY EXAM CHEST 1 VIEW: CPT

## 2019-05-24 PROCEDURE — 86747 PARVOVIRUS ANTIBODY: CPT

## 2019-05-24 PROCEDURE — 86900 BLOOD TYPING SEROLOGIC ABO: CPT

## 2019-05-24 PROCEDURE — 85652 RBC SED RATE AUTOMATED: CPT

## 2019-05-24 PROCEDURE — 84100 ASSAY OF PHOSPHORUS: CPT

## 2019-05-24 PROCEDURE — 86160 COMPLEMENT ANTIGEN: CPT

## 2019-05-24 PROCEDURE — 74176 CT ABD & PELVIS W/O CONTRAST: CPT

## 2019-05-24 PROCEDURE — 83690 ASSAY OF LIPASE: CPT

## 2019-05-24 PROCEDURE — 74182 MRI ABDOMEN W/CONTRAST: CPT | Mod: 26

## 2019-05-24 PROCEDURE — 87486 CHLMYD PNEUM DNA AMP PROBE: CPT

## 2019-05-24 PROCEDURE — 99239 HOSP IP/OBS DSCHRG MGMT >30: CPT | Mod: GC

## 2019-05-24 PROCEDURE — 86225 DNA ANTIBODY NATIVE: CPT

## 2019-05-24 RX ORDER — IBUPROFEN 200 MG
0 TABLET ORAL
Qty: 0 | Refills: 0 | DISCHARGE

## 2019-05-24 RX ORDER — HYDROXYCHLOROQUINE SULFATE 200 MG
1 TABLET ORAL
Qty: 0 | Refills: 0 | DISCHARGE
Start: 2019-05-24

## 2019-05-24 RX ORDER — POLYETHYLENE GLYCOL 3350 17 G/17G
17 POWDER, FOR SOLUTION ORAL DAILY
Refills: 0 | Status: DISCONTINUED | OUTPATIENT
Start: 2019-05-24 | End: 2019-05-24

## 2019-05-24 RX ORDER — HYDROXYCHLOROQUINE SULFATE 200 MG
1 TABLET ORAL
Qty: 30 | Refills: 0
Start: 2019-05-24 | End: 2019-06-22

## 2019-05-24 RX ORDER — ACETAMINOPHEN 500 MG
0 TABLET ORAL
Qty: 0 | Refills: 0 | DISCHARGE

## 2019-05-24 RX ADMIN — Medication 20 MILLIGRAM(S): at 06:04

## 2019-05-24 RX ADMIN — CEFEPIME 100 MILLIGRAM(S): 1 INJECTION, POWDER, FOR SOLUTION INTRAMUSCULAR; INTRAVENOUS at 06:04

## 2019-05-24 NOTE — PROGRESS NOTE ADULT - SUBJECTIVE AND OBJECTIVE BOX
Patient is a 23y old  Female who presents with a chief complaint of fever (23 May 2019 11:21)      SUBJECTIVE / OVERNIGHT EVENTS:          MEDICATIONS  (STANDING):  cefepime   IVPB 2000 milliGRAM(s) IV Intermittent every 8 hours  enoxaparin Injectable 40 milliGRAM(s) SubCutaneous daily  hydroxychloroquine 200 milliGRAM(s) Oral daily  predniSONE   Tablet 20 milliGRAM(s) Oral daily    MEDICATIONS  (PRN):      Vital Signs Last 24 Hrs  T(C): 36.2 (24 May 2019 04:43), Max: 36.7 (23 May 2019 13:48)  T(F): 97.2 (24 May 2019 04:43), Max: 98.1 (23 May 2019 13:48)  HR: 69 (24 May 2019 04:43) (69 - 78)  BP: 101/62 (24 May 2019 04:43) (93/64 - 101/62)  BP(mean): --  RR: 18 (24 May 2019 04:43) (18 - 18)  SpO2: 100% (24 May 2019 04:43) (99% - 100%)      PHYSICAL EXAM  GENERAL: NAD, well-developed  HEAD:  Atraumatic, Normocephalic  EYES: EOMI, PERRLA, conjunctiva and sclera clear  NECK: Supple, No JVD  CHEST/LUNG: Clear to auscultation bilaterally; No wheeze  HEART: Regular rate and rhythm; No murmurs, rubs, or gallops  ABDOMEN: Soft, Nontender, Nondistended; Bowel sounds present  EXTREMITIES:  2+ Peripheral Pulses, No clubbing, cyanosis, or edema  PSYCH: AAOx3  SKIN: No rashes or lesions    CAPILLARY BLOOD GLUCOSE        I&O's Summary    23 May 2019 07:01  -  24 May 2019 07:00  --------------------------------------------------------  IN: 780 mL / OUT: 3 mL / NET: 777 mL        LABS:                        9.3    2.3   )-----------( 127      ( 23 May 2019 09:24 )             28.9     05-23    139  |  108  |  6<L>  ----------------------------<  102<H>  3.8   |  25  |  0.60    Ca    8.1<L>      23 May 2019 09:24    TPro  7.6  /  Alb  2.7<L>  /  TBili  0.2  /  DBili  x   /  AST  26  /  ALT  7<L>  /  AlkPhos  49  05-23      CARDIAC MARKERS ( 23 May 2019 09:24 )  x     / x     / 28 U/L / x     / x              RADIOLOGY & ADDITIONAL TESTS:     MICROBIOLOGY:    ANTIMICROBIALS:    CONSULTS: Patient is a 23y old  Female who presents with a chief complaint of fever (23 May 2019 11:21)      SUBJECTIVE / OVERNIGHT EVENTS:    No acute events overnight, Pt continues to endorse epigastric abdominal pain. Denies any fever, chills, nausea, vomiting, chest pain, SOB, palpitations, or LE edema.       MEDICATIONS  (STANDING):  cefepime   IVPB 2000 milliGRAM(s) IV Intermittent every 8 hours  enoxaparin Injectable 40 milliGRAM(s) SubCutaneous daily  hydroxychloroquine 200 milliGRAM(s) Oral daily  predniSONE   Tablet 20 milliGRAM(s) Oral daily    MEDICATIONS  (PRN):      Vital Signs Last 24 Hrs  T(C): 36.2 (24 May 2019 04:43), Max: 36.7 (23 May 2019 13:48)  T(F): 97.2 (24 May 2019 04:43), Max: 98.1 (23 May 2019 13:48)  HR: 69 (24 May 2019 04:43) (69 - 78)  BP: 101/62 (24 May 2019 04:43) (93/64 - 101/62)  BP(mean): --  RR: 18 (24 May 2019 04:43) (18 - 18)  SpO2: 100% (24 May 2019 04:43) (99% - 100%)      PHYSICAL EXAM  GENERAL: NAD, well-developed  HEAD:  Atraumatic, Normocephalic  EYES: EOMI, PERRLA, conjunctiva and sclera clear  NECK: Supple, No JVD  CHEST/LUNG: Clear to auscultation bilaterally; No wheeze  HEART: Regular rate and rhythm; No murmurs, rubs, or gallops  ABDOMEN: Soft, Nontender, Nondistended; Bowel sounds present  EXTREMITIES:  2+ Peripheral Pulses, No clubbing, cyanosis, or edema      CAPILLARY BLOOD GLUCOSE        I&O's Summary    23 May 2019 07:01  -  24 May 2019 07:00  --------------------------------------------------------  IN: 780 mL / OUT: 3 mL / NET: 777 mL        LABS:                        9.3    2.3   )-----------( 127      ( 23 May 2019 09:24 )             28.9     05-23    139  |  108  |  6<L>  ----------------------------<  102<H>  3.8   |  25  |  0.60    Ca    8.1<L>      23 May 2019 09:24    TPro  7.6  /  Alb  2.7<L>  /  TBili  0.2  /  DBili  x   /  AST  26  /  ALT  7<L>  /  AlkPhos  49  05-23      CARDIAC MARKERS ( 23 May 2019 09:24 )  x     / x     / 28 U/L / x     / x

## 2019-05-24 NOTE — DISCHARGE NOTE PROVIDER - NSDCCPCAREPLAN_GEN_ALL_CORE_FT
PRINCIPAL DISCHARGE DIAGNOSIS  Diagnosis: SLE (systemic lupus erythematosus)  Assessment and Plan of Treatment: You came to the hospital with complaints of abdominal pain and fever, You were found to have a flare of your lupus. You were treated with prednisone and you deferred treatment with Plaquenil. Please follow up with Dr. Padron

## 2019-05-24 NOTE — PROGRESS NOTE ADULT - PROBLEM SELECTOR PLAN 2
C3 and C4 level decreased, DsDNA pending   -Rheumatology consulted. Started on prednisone and Plaquenil to treat for SLE flare.  -F/u with dsDNA, ferritin, APS labs and iron studies  -CMV IgM came back positive, pending PCR   -outpatient follow up for further management C/w lupus flare as C3 and C4 level decreased and dsDNA elevated. Iron studies WNL. CMV IgM came back positive, pending PCR   -Rheumatology consulted. Started on prednisone and Plaquenil to treat SLE flare. However, patient actively refusing Plaquenil because of past side effects. She was explained the risk and benefits of under-treating her SLE flare and she verbalized understanding. She still deferred treatment and wants to follow up with her rheumatology as outpatient.

## 2019-05-24 NOTE — DISCHARGE NOTE PROVIDER - HOSPITAL COURSE
24 yo F with Pmhx of SLE on no chronic therapy presented with abdominal pain and persistent fever, found to have pancytopenia, decreased complement level, and elevated dsDNA, c/w SLE flare. Blood culture, urine culture, and RVP was negative. CMV IgM was positive, pending CMV PCR. Iron studies and ferritin were WNL, ruling out HLH. CT A/P was also performed which showed no acute pathology. Rheumatology was consulted. She was started on prednisone 20 mg and Plaquenil. However, patient deferred plaqenil and opted for further outpatient management. Currently, she is stable enough to be discharged home with close outpatient follow up. 24 yo F with Pmhx of SLE on no chronic therapy presented with abdominal pain and persistent fever, found to have pancytopenia, decreased complement level, and elevated dsDNA, c/w SLE flare. Blood culture, urine culture, and RVP was negative. CMV IgM was positive, pending CMV PCR. Iron studies and ferritin were WNL, ruling out HLH. CT A/P was also performed which showed no acute pathology. She had an MRI of the Liver which showed a Hepatic Adenoma. Rheumatology was consulted. She was started on prednisone 20 mg and Plaquenil. However, patient deferred plaqenil and opted for further outpatient management. Currently, she is stable enough to be discharged home with close outpatient follow up.

## 2019-05-24 NOTE — PROGRESS NOTE ADULT - PROBLEM SELECTOR PLAN 3
Initially decreased WBC, Hgb, platelets likely in setting of SLE flare or infectious etiology   -Gradually improving   -C/w cefepime for now as patient is immunocompromised   -Trend CBC and fever curve daily. Initially decreased WBC, Hgb, platelets likely in setting of SLE flare or infectious etiology   -Gradually improving   -D/c cefepime as patient is clinically stable, nontoxic.   -Trend CBC and fever curve daily.

## 2019-05-24 NOTE — DISCHARGE NOTE PROVIDER - CARE PROVIDER_API CALL
Kushal Pacheco; PhD)  Autoimmune Disease  [address] [place] [address]  Phone: [phone]  Fax: [phone]  Follow Up Time: 1 week

## 2019-05-24 NOTE — DISCHARGE NOTE NURSING/CASE MANAGEMENT/SOCIAL WORK - NSDCDPATPORTLINK_GEN_ALL_CORE
You can access the [email or web address], offered by [facility], by registering with the following website: [email or web address]

## 2019-05-24 NOTE — PROGRESS NOTE ADULT - ATTENDING COMMENTS
symptomatically the patient is feeling better.    Discharge pending MRI of the liver for eval of a Liver Lesion seen on ultrasound  Discharge time spent: 34 min
pt feels better today  labs concerning for lupus flare--> will start on prednisone and Plaquenil.  will get Liver MRI for eval of output liver lesion.

## 2019-05-29 LAB
CMV DNA CSF QL NAA+PROBE: SIGNIFICANT CHANGE UP
CMV DNA SPEC NAA+PROBE-LOG#: SIGNIFICANT CHANGE UP LOGIU/ML

## 2019-08-22 ENCOUNTER — APPOINTMENT (OUTPATIENT)
Dept: INFECTIOUS DISEASE | Facility: CLINIC | Age: 24
End: 2019-08-22

## 2019-09-10 ENCOUNTER — OUTPATIENT (OUTPATIENT)
Dept: OUTPATIENT SERVICES | Facility: HOSPITAL | Age: 24
LOS: 1 days | Discharge: HOME | End: 2019-09-10

## 2019-09-10 DIAGNOSIS — D61.810 ANTINEOPLASTIC CHEMOTHERAPY INDUCED PANCYTOPENIA: ICD-10-CM

## 2019-09-12 ENCOUNTER — RESULT REVIEW (OUTPATIENT)
Age: 24
End: 2019-09-12

## 2019-09-12 ENCOUNTER — APPOINTMENT (OUTPATIENT)
Dept: HEMATOLOGY ONCOLOGY | Facility: CLINIC | Age: 24
End: 2019-09-12
Payer: COMMERCIAL

## 2019-09-12 VITALS
OXYGEN SATURATION: 100 % | DIASTOLIC BLOOD PRESSURE: 71 MMHG | WEIGHT: 107.34 LBS | RESPIRATION RATE: 16 BRPM | BODY MASS INDEX: 17.05 KG/M2 | HEIGHT: 66.38 IN | HEART RATE: 80 BPM | TEMPERATURE: 97.9 F | SYSTOLIC BLOOD PRESSURE: 101 MMHG

## 2019-09-12 LAB
BASOPHILS # BLD AUTO: 0 K/UL — SIGNIFICANT CHANGE UP (ref 0–0.2)
BASOPHILS NFR BLD AUTO: 0.2 % — SIGNIFICANT CHANGE UP (ref 0–2)
EOSINOPHIL # BLD AUTO: 0 K/UL — SIGNIFICANT CHANGE UP (ref 0–0.5)
EOSINOPHIL NFR BLD AUTO: 0.8 % — SIGNIFICANT CHANGE UP (ref 0–6)
HCT VFR BLD CALC: 38.1 % — SIGNIFICANT CHANGE UP (ref 34.5–45)
HGB BLD-MCNC: 12.2 G/DL — SIGNIFICANT CHANGE UP (ref 11.5–15.5)
LYMPHOCYTES # BLD AUTO: 1.6 K/UL — SIGNIFICANT CHANGE UP (ref 1–3.3)
LYMPHOCYTES # BLD AUTO: 30.9 % — SIGNIFICANT CHANGE UP (ref 13–44)
MCHC RBC-ENTMCNC: 30.2 PG — SIGNIFICANT CHANGE UP (ref 27–34)
MCHC RBC-ENTMCNC: 32 G/DL — SIGNIFICANT CHANGE UP (ref 32–36)
MCV RBC AUTO: 94.6 FL — SIGNIFICANT CHANGE UP (ref 80–100)
MONOCYTES # BLD AUTO: 0.3 K/UL — SIGNIFICANT CHANGE UP (ref 0–0.9)
MONOCYTES NFR BLD AUTO: 6 % — SIGNIFICANT CHANGE UP (ref 2–14)
NEUTROPHILS # BLD AUTO: 3.3 K/UL — SIGNIFICANT CHANGE UP (ref 1.8–7.4)
NEUTROPHILS NFR BLD AUTO: 62 % — SIGNIFICANT CHANGE UP (ref 43–77)
PLATELET # BLD AUTO: 200 K/UL — SIGNIFICANT CHANGE UP (ref 150–400)
RBC # BLD: 4.03 M/UL — SIGNIFICANT CHANGE UP (ref 3.8–5.2)
RBC # FLD: 16.5 % — HIGH (ref 10.3–14.5)
WBC # BLD: 5.3 K/UL — SIGNIFICANT CHANGE UP (ref 3.8–10.5)
WBC # FLD AUTO: 5.3 K/UL — SIGNIFICANT CHANGE UP (ref 3.8–10.5)

## 2019-09-12 PROCEDURE — 99203 OFFICE O/P NEW LOW 30 MIN: CPT

## 2019-09-14 NOTE — ASSESSMENT
[FreeTextEntry1] : 23 year old female who was anemic in the recent past in april here to see me for that purpose but is no longer anemic.\par \par Based on her history she was probably anemic from a flair of her disease.\par \par She has had pancytopenia in the past in the setting of a lupus flair, but this has resolved\par \par Her workup for anemia- underlying iron def/ b12 def is negative, she can follow up with me in the event that abnormal counts arise.\par \par Discussed with patient and her grandmother.

## 2019-09-23 ENCOUNTER — APPOINTMENT (OUTPATIENT)
Dept: INFECTIOUS DISEASE | Facility: CLINIC | Age: 24
End: 2019-09-23
Payer: COMMERCIAL

## 2019-09-23 VITALS
WEIGHT: 110 LBS | HEIGHT: 66 IN | DIASTOLIC BLOOD PRESSURE: 67 MMHG | TEMPERATURE: 98.5 F | SYSTOLIC BLOOD PRESSURE: 104 MMHG | HEART RATE: 96 BPM | OXYGEN SATURATION: 99 % | BODY MASS INDEX: 17.68 KG/M2

## 2019-09-23 PROCEDURE — 99242 OFF/OP CONSLTJ NEW/EST SF 20: CPT

## 2019-09-23 NOTE — ASSESSMENT
[FreeTextEntry1] : This is a 22 yo F with SLE who presents today for possible latent TB.  Quantiferon gold test positive 7/15/19.  She is now on prednisone 30 mg po daily. I will try to send another quant gold but her test may be indeterminant due to her immunosuppression.\par \par I will get a CT chest b/c she had a normal chest xray 5/22/19 but a ct abdomen from that same day reported LLL opacity. CT chest will be helpful in making sure there is no active TB.\par \par She has no symptoms of active TB today.\par \par I asked that she return in one month.

## 2019-09-23 NOTE — HISTORY OF PRESENT ILLNESS
[FreeTextEntry1] : This is a 22 yo F with SLE, diagnosed in 2017 who presents today for positive quantiferon gold.  Pt reports she had a recent SLE flare. She is now on Prednisone 30 mg po daily. Has appt with rheumatology next month.\par \par She had a quantiferon gold test sent 7/15/19 which was positive.\par I looked back in [facility] records and there is one from 2017 which is negative.\par \par She had a chest xray 5/22/19 which was clear but a CT a/p from that same date reported possilbe LLL pneumonia.  She reports she may have had a cough then.\par \par Denies fevers, chills, sweats. Has some weight loss which she relates to lupus flare.  No known exposures to TB and never been treated for TB in the past. \par

## 2019-09-23 NOTE — CONSULT LETTER
[Dear  ___] : Dear  [unfilled], [Consult Letter:] : I had the pleasure of evaluating your patient, [unfilled]. [Please see my note below.] : Please see my note below. [Consult Closing:] : Thank you very much for allowing me to participate in the care of this patient.  If you have any questions, please do not hesitate to contact me. [Sincerely,] : Sincerely, [FreeTextEntry2] : Dr. Logan Veronica [FreeTextEntry3] : \par Rozina Mendieta MD\par  of Medicine\par Division of Infectious Diseases\par [facility]\par [address] [place] [address] Dr[address]veen\[place] [address]\par Tel: [phone]\par Fax: [phone]\par \par \par  [DrParveen  ___] : Dr. EUCEDA

## 2019-09-23 NOTE — PHYSICAL EXAM
[General Appearance - Alert] : alert [General Appearance - In No Acute Distress] : in no acute distress [Sclera] : the sclera and conjunctiva were normal [PERRL With Normal Accommodation] : pupils were equal in size, round, reactive to light [Outer Ear] : the ears and nose were normal in appearance [FreeTextEntry1] : +thrush [Neck Appearance] : the appearance of the neck was normal [] : no respiratory distress [Auscultation Breath Sounds / Voice Sounds] : lungs were clear to auscultation bilaterally [Heart Rate And Rhythm] : heart rate was normal and rhythm regular [Heart Sounds] : normal S1 and S2 [Heart Sounds Gallop] : no gallops [Murmurs] : no murmurs [Heart Sounds Pericardial Friction Rub] : no pericardial rub [Edema] : there was no peripheral edema [Bowel Sounds] : normal bowel sounds [Abdomen Soft] : soft [Cervical Lymph Nodes Enlarged Posterior Bilaterally] : posterior cervical [Cervical Lymph Nodes Enlarged Anterior Bilaterally] : anterior cervical [Supraclavicular Lymph Nodes Enlarged Bilaterally] : supraclavicular [Skin Lesions] : no skin lesions [No Focal Deficits] : no focal deficits [Affect] : the affect was normal [Oriented To Time, Place, And Person] : oriented to person, place, and time

## 2019-09-27 LAB
M TB IFN-G BLD-IMP: NEGATIVE
QUANTIFERON TB PLUS MITOGEN MINUS NIL: 2.12 IU/ML
QUANTIFERON TB PLUS NIL: 1.42 IU/ML
QUANTIFERON TB PLUS TB1 MINUS NIL: -0.15 IU/ML
QUANTIFERON TB PLUS TB2 MINUS NIL: -0.13 IU/ML

## 2019-09-30 ENCOUNTER — APPOINTMENT (OUTPATIENT)
Dept: GASTROENTEROLOGY | Facility: CLINIC | Age: 24
End: 2019-09-30
Payer: COMMERCIAL

## 2019-09-30 VITALS
WEIGHT: 109 LBS | BODY MASS INDEX: 17.52 KG/M2 | SYSTOLIC BLOOD PRESSURE: 100 MMHG | HEIGHT: 66 IN | OXYGEN SATURATION: 95 % | DIASTOLIC BLOOD PRESSURE: 60 MMHG | TEMPERATURE: 98.4 F | HEART RATE: 84 BPM

## 2019-09-30 DIAGNOSIS — R10.13 EPIGASTRIC PAIN: ICD-10-CM

## 2019-09-30 PROCEDURE — 99204 OFFICE O/P NEW MOD 45 MIN: CPT

## 2019-09-30 NOTE — ASSESSMENT
[FreeTextEntry1] : This is a 23-year-old female with epigastric abdominal pain since January. MRI of the liver with a 2.3 cm hepatic adenoma. I doubt this is the cause of her abdominal pain. However I am referring her to hepatology for management. In the meantime I asked for copy of her recent upper endoscopy biopsies faxed to me. I will be giving her a trial of omeprazole 20 mg to be taken half hour before breakfast. She is to give me a call in 2 weeks if there's no response.

## 2019-09-30 NOTE — PHYSICAL EXAM
[General Appearance - Alert] : alert [General Appearance - In No Acute Distress] : in no acute distress [Sclera] : the sclera and conjunctiva were normal [Outer Ear] : the ears and nose were normal in appearance [Auscultation Breath Sounds / Voice Sounds] : lungs were clear to auscultation bilaterally [Heart Rate And Rhythm] : heart rate was normal and rhythm regular [Heart Sounds Gallop] : no gallops [Heart Sounds] : normal S1 and S2 [Murmurs] : no murmurs [Heart Sounds Pericardial Friction Rub] : no pericardial rub [Edema] : there was no peripheral edema [Bowel Sounds] : normal bowel sounds [Abdomen Soft] : soft [Abdomen Tenderness] : non-tender [] : no hepato-splenomegaly [Abdomen Mass (___ Cm)] : no abdominal mass palpated [Skin Color & Pigmentation] : normal skin color and pigmentation [No Focal Deficits] : no focal deficits

## 2019-09-30 NOTE — HISTORY OF PRESENT ILLNESS
[FreeTextEntry1] : This is a 23-year-old female with history of systemic lupus erythematosus on prednisone, presents for evaluation of epigastric abdominal pain. She reports abdominal pain since January described as sharp, lasting a few minutes, but occurs throughout the day. The pain is nonradiating and not associated with nausea, vomiting or change in bowel habits. She denies fevers or chills. The pain can be worse with food but can happen even without eating. She denies weight loss. She denies melena. Review recent bloodwork showing no evidence of anemia. She saw another gastroenterologist who sent her for a sonogram in April and was found to have a liver mass. This was followed by MRI of the liver with contrast in May showing a 2.3 cm hepatic adenoma. She also had a CT abdomen pelvis in May which was essentially unremarkable. She denies use of oral contraceptives. She relates that in January when this all started she was taking NSAIDs. She relates that she underwent an upper endoscopy in April at [facility] reported to be normal. She was given famotidine 20 mg without response. I asked for a copy of her upper endoscopy with biopsy results faxed to me. She is accompanied by her grandmother.

## 2019-10-02 ENCOUNTER — APPOINTMENT (OUTPATIENT)
Dept: NEPHROLOGY | Facility: CLINIC | Age: 24
End: 2019-10-02
Payer: COMMERCIAL

## 2019-10-02 VITALS
HEART RATE: 100 BPM | OXYGEN SATURATION: 98 % | SYSTOLIC BLOOD PRESSURE: 101 MMHG | DIASTOLIC BLOOD PRESSURE: 71 MMHG | HEIGHT: 66 IN | BODY MASS INDEX: 17.36 KG/M2 | WEIGHT: 108 LBS

## 2019-10-02 DIAGNOSIS — R80.9 PROTEINURIA, UNSPECIFIED: ICD-10-CM

## 2019-10-02 PROCEDURE — 99244 OFF/OP CNSLTJ NEW/EST MOD 40: CPT

## 2019-10-02 RX ORDER — FAMOTIDINE 20 MG/1
20 TABLET, FILM COATED ORAL
Qty: 30 | Refills: 5 | Status: DISCONTINUED | COMMUNITY
Start: 2019-09-12 | End: 2019-10-02

## 2019-10-03 LAB
ALBUMIN SERPL ELPH-MCNC: 3.7 G/DL
ANION GAP SERPL CALC-SCNC: 9 MMOL/L
APPEARANCE: CLEAR
BACTERIA: NEGATIVE
BASOPHILS # BLD AUTO: 0.02 K/UL
BASOPHILS NFR BLD AUTO: 0.3 %
BILIRUBIN URINE: NEGATIVE
BLOOD URINE: NEGATIVE
BUN SERPL-MCNC: 10 MG/DL
C3 SERPL-MCNC: 101 MG/DL
C4 SERPL-MCNC: 14 MG/DL
CALCIUM SERPL-MCNC: 9.4 MG/DL
CHLORIDE SERPL-SCNC: 103 MMOL/L
CO2 SERPL-SCNC: 29 MMOL/L
COLOR: YELLOW
CREAT SERPL-MCNC: 0.66 MG/DL
CREAT SPEC-SCNC: 296 MG/DL
CREAT/PROT UR: 0.2 RATIO
DSDNA AB SER-ACNC: 469 IU/ML
EOSINOPHIL # BLD AUTO: 0.01 K/UL
EOSINOPHIL NFR BLD AUTO: 0.2 %
GLUCOSE QUALITATIVE U: NEGATIVE
GLUCOSE SERPL-MCNC: 60 MG/DL
HCT VFR BLD CALC: 39.9 %
HGB BLD-MCNC: 12.1 G/DL
HYALINE CASTS: 0 /LPF
IMM GRANULOCYTES NFR BLD AUTO: 0.3 %
KETONES URINE: NORMAL
LEUKOCYTE ESTERASE URINE: NEGATIVE
LYMPHOCYTES # BLD AUTO: 2.39 K/UL
LYMPHOCYTES NFR BLD AUTO: 39.9 %
MAN DIFF?: NORMAL
MCHC RBC-ENTMCNC: 29.6 PG
MCHC RBC-ENTMCNC: 30.3 GM/DL
MCV RBC AUTO: 97.6 FL
MICROSCOPIC-UA: NORMAL
MONOCYTES # BLD AUTO: 0.51 K/UL
MONOCYTES NFR BLD AUTO: 8.5 %
NEUTROPHILS # BLD AUTO: 3.04 K/UL
NEUTROPHILS NFR BLD AUTO: 50.8 %
NITRITE URINE: NEGATIVE
PH URINE: 6.5
PHOSPHATE SERPL-MCNC: 3.7 MG/DL
PLATELET # BLD AUTO: 218 K/UL
POTASSIUM SERPL-SCNC: 4.4 MMOL/L
PROT UR-MCNC: 67 MG/DL
PROTEIN URINE: ABNORMAL
RBC # BLD: 4.09 M/UL
RBC # FLD: 15.9 %
RED BLOOD CELLS URINE: 1 /HPF
SODIUM SERPL-SCNC: 141 MMOL/L
SPECIFIC GRAVITY URINE: 1.04
SQUAMOUS EPITHELIAL CELLS: 5 /HPF
UROBILINOGEN URINE: NORMAL
WBC # FLD AUTO: 5.99 K/UL
WHITE BLOOD CELLS URINE: 4 /HPF

## 2019-10-09 ENCOUNTER — LABORATORY RESULT (OUTPATIENT)
Age: 24
End: 2019-10-09

## 2019-10-09 ENCOUNTER — APPOINTMENT (OUTPATIENT)
Dept: RHEUMATOLOGY | Facility: CLINIC | Age: 24
End: 2019-10-09
Payer: COMMERCIAL

## 2019-10-09 VITALS
BODY MASS INDEX: 18 KG/M2 | WEIGHT: 112 LBS | HEIGHT: 66 IN | HEART RATE: 86 BPM | SYSTOLIC BLOOD PRESSURE: 105 MMHG | OXYGEN SATURATION: 98 % | TEMPERATURE: 98.7 F | DIASTOLIC BLOOD PRESSURE: 74 MMHG

## 2019-10-09 PROCEDURE — 99205 OFFICE O/P NEW HI 60 MIN: CPT

## 2019-10-10 LAB
25(OH)D3 SERPL-MCNC: 38.1 NG/ML
CCP AB SER IA-ACNC: 12 UNITS
CK SERPL-CCNC: 20 U/L
DEPRECATED KAPPA LC FREE/LAMBDA SER: 1.36 RATIO
ENA RNP AB SER IA-ACNC: 2.9 AL
ENA SM AB SER IA-ACNC: 2.8 AL
ENA SS-A AB SER IA-ACNC: >8 AL
ENA SS-B AB SER IA-ACNC: 0.6 AL
IGA SER QL IEP: 633 MG/DL
IGG SER QL IEP: 2448 MG/DL
IGM SER QL IEP: 115 MG/DL
INR PPP: 0.94 RATIO
KAPPA LC CSF-MCNC: 4.26 MG/DL
KAPPA LC SERPL-MCNC: 5.78 MG/DL
LUPUS ANTICOAGULANT CASCADE REFLEX: NORMAL
PT BLD: 10.7 SEC
RF+CCP IGG SER-IMP: NEGATIVE
TSH SERPL-ACNC: 3.22 UIU/ML

## 2019-10-16 NOTE — DISCHARGE NOTE ADULT - ADDITIONAL INSTRUCTIONS
Assumed care of pt  1300. Pt discharged and readmitted under inpatient hospice service. Extubated at 1330. Morphine gtt started. PRN meds given to dry out secretions. Turned Q2H. Emotional support provided to family.  Care of pt endorsed to Noc shift RN at Please followup with Dr. Sykes, rheumatologist, in one week, and continue with Prednisone, as prescribed. Please followup with Dr. Sykes, rheumatologist, in one week, and continue with Prednisone, as prescribed:   Prednisone 40 mg daily x 7 days then taper by 10 mg q 7days.  Pt  stable for d/c home with rheumatologist f/u outpt in 7 days

## 2019-10-24 NOTE — PHYSICAL EXAM
[General Appearance - Alert] : alert [General Appearance - In No Acute Distress] : in no acute distress [General Appearance - Well Nourished] : well nourished [Sclera] : the sclera and conjunctiva were normal [PERRL With Normal Accommodation] : pupils were equal in size, round, and reactive to light [Extraocular Movements] : extraocular movements were intact [Neck Appearance] : the appearance of the neck was normal [Respiration, Rhythm And Depth] : normal respiratory rhythm and effort [Exaggerated Use Of Accessory Muscles For Inspiration] : no accessory muscle use [Apical Impulse] : the apical impulse was normal [Heart Rate And Rhythm] : heart rate was normal and rhythm regular [Heart Sounds] : normal S1 and S2 [Heart Sounds Gallop] : no gallops [Edema] : there was no peripheral edema [Bowel Sounds] : normal bowel sounds [Abdomen Soft] : soft [Abnormal Walk] : normal gait [Musculoskeletal - Swelling] : no joint swelling seen [No Focal Deficits] : no focal deficits [Oriented To Time, Place, And Person] : oriented to person, place, and time [Impaired Insight] : insight and judgment were intact [FreeTextEntry1] : thrush [] : no rash

## 2019-10-24 NOTE — HISTORY OF PRESENT ILLNESS
[FreeTextEntry1] : 23 year old  with known diagnosis of System Lupus Erythematosus for last 2 years presents to the clinic as a referral for protein in the urine.\par \par Patient states she was diagnosed with Lupus 2 years ago and was initially started on Plaquenil in January 2018 and stopped in January 2019. Currently she is  on Prednisone after a recent flair in May. She notes that her flairs present as fevers, and joint pains in the knees and elbows. She recently also had a flair with chest pain with pericarditis? and GI symptoms as well. She currently has been stable and was initially prescribed 40 mg Prednisone however down-titrated to 20 mg on her own as she could not tolerate 40 mg. She was initially followed with Rheumatology outside of [facility] and is going to be followed by Dr. Veronica. Otherwise patient currently has minimal symptoms and her Lupus is quiescent.

## 2019-10-24 NOTE — REVIEW OF SYSTEMS
[Eye Pain] : eye pain [Arthralgias] : arthralgias [Joint Pain] : joint pain [Joint Swelling] : joint swelling [Fever] : no fever [Chills] : no chills [Feeling Tired] : not feeling tired [Feeling Poorly] : not feeling poorly [Red Eyes] : eyes not red [Eyesight Problems] : no eyesight problems [Discharge From Eyes] : no purulent discharge from the eyes [Earache] : no earache [Loss Of Hearing] : no hearing loss [Chest Pain] : no chest pain [Palpitations] : no palpitations [Leg Claudication] : no intermittent leg claudication [Lower Ext Edema] : no lower extremity edema [Wheezing] : no wheezing [Shortness Of Breath] : no shortness of breath [Abdominal Pain] : no abdominal pain [Cough] : no cough [SOB on Exertion] : no shortness of breath during exertion [Vomiting] : no vomiting [Constipation] : no constipation [Diarrhea] : no diarrhea [Incontinence] : no incontinence [Dysuria] : no dysuria [Skin Lesions] : no skin lesions [Skin Wound] : no skin wound [Itching] : no itching [Change In A Mole] : no change in a mole

## 2019-11-09 NOTE — PROGRESS NOTE ADULT - PROBLEM SELECTOR PLAN 1
DATE OF ADMISSION:  11/08/2019



DATE OF DISCHARGE:  11/09/2019



DIAGNOSES OF DISCHARGE:

1. Evidence of lactic acidosis, which might have been caused by some

dehydration, however, underlying urinary tract infection is not completely

excluded.

2. Dementia.

3. Hypertension.

4. Resolution of the lactic acidosis.



HISTORY AND PHYSICAL AND HOSPITAL COURSE:  For details, please refer to the

History and Physical dictated in the chart.  This is an 88-year-old

 male, resident of a nursing home, initially was sent for

apparently some hypotension to the emergency room of [facility], however, he never had any hypotension.  When he came to the ER,

all the laboratories looked okay.  There was no leukocytosis.  Urinalysis

showed evidence of 2 to 4 wbc's per high-power field.  Urine culture

unfortunately was sent off.  He was given some meropenem in the emergency

room and some intravenous fluid was given.  He was admitted overnight for

observation.  Intravenous fluid was given and he was started on Zosyn

3.375 g IV q.8 hours.  Lactic acid from 2.9 initially came down to about

1.5.  He is still very confused and is not able to give much history.  On

physical examination, he did not seem to be in any fluid overload.  At

this point, he is euvolemic by the time of discharge.  For the sake of

safety, we put him on some Levaquin 500 mg p.o. daily for a total of 10

more days in order to treat possible UTI that he might have had.  As I

mentioned unfortunately urine culture was not sent off in the emergency

room before the antibiotic was given.  Follow up will be with Dr. Dat Greene.  Time spent to arrange for discharge on this gentleman was about

45 minutes.









  ______________________________________________

  Shorty Polanco M.D. DR:  JACKSON

D:  11/09/2019 16:15

T:  11/09/2019 21:28

JOB#:  [id number]

CC: Most likely due to gastritis, cholecystitis, pancreatitis or constipation,   -CT A/P showed no acute finding   -Outpatient US showed hepatic lesion. EGD was negative for any acute finding as well.   -MRI of the abdomen w/wo contrast to further assess the hepatic lesion.

## 2019-12-31 ENCOUNTER — MEDICATION RENEWAL (OUTPATIENT)
Age: 24
End: 2019-12-31

## 2020-01-14 ENCOUNTER — APPOINTMENT (OUTPATIENT)
Dept: RHEUMATOLOGY | Facility: CLINIC | Age: 25
End: 2020-01-14
Payer: COMMERCIAL

## 2020-01-14 VITALS
HEART RATE: 96 BPM | WEIGHT: 116 LBS | OXYGEN SATURATION: 99 % | TEMPERATURE: 99.3 F | DIASTOLIC BLOOD PRESSURE: 79 MMHG | SYSTOLIC BLOOD PRESSURE: 109 MMHG | HEIGHT: 66 IN | BODY MASS INDEX: 18.64 KG/M2 | RESPIRATION RATE: 16 BRPM

## 2020-01-14 DIAGNOSIS — M19.90 UNSPECIFIED OSTEOARTHRITIS, UNSPECIFIED SITE: ICD-10-CM

## 2020-01-14 DIAGNOSIS — D89.2 HYPERGAMMAGLOBULINEMIA, UNSPECIFIED: ICD-10-CM

## 2020-01-14 PROCEDURE — 99214 OFFICE O/P EST MOD 30 MIN: CPT

## 2020-01-15 LAB
25(OH)D3 SERPL-MCNC: 23.9 NG/ML
ALBUMIN SERPL ELPH-MCNC: 3.5 G/DL
ALP BLD-CCNC: 65 U/L
ALT SERPL-CCNC: 13 U/L
ANION GAP SERPL CALC-SCNC: 10 MMOL/L
APPEARANCE: CLEAR
AST SERPL-CCNC: 25 U/L
BACTERIA: NEGATIVE
BASOPHILS # BLD AUTO: 0.01 K/UL
BASOPHILS NFR BLD AUTO: 0.2 %
BILIRUB SERPL-MCNC: 0.2 MG/DL
BILIRUBIN URINE: NEGATIVE
BLOOD URINE: NEGATIVE
BUN SERPL-MCNC: 14 MG/DL
C3 SERPL-MCNC: 94 MG/DL
C4 SERPL-MCNC: 12 MG/DL
CALCIUM SERPL-MCNC: 8.7 MG/DL
CHLORIDE SERPL-SCNC: 101 MMOL/L
CK SERPL-CCNC: 19 U/L
CO2 SERPL-SCNC: 27 MMOL/L
COLOR: YELLOW
CREAT SERPL-MCNC: 0.76 MG/DL
CREAT SPEC-SCNC: 289 MG/DL
CREAT/PROT UR: 0.1 RATIO
DSDNA AB SER-ACNC: 506 IU/ML
EOSINOPHIL # BLD AUTO: 0.04 K/UL
EOSINOPHIL NFR BLD AUTO: 1 %
GLUCOSE QUALITATIVE U: NEGATIVE
GLUCOSE SERPL-MCNC: 81 MG/DL
HCT VFR BLD CALC: 39.1 %
HGB BLD-MCNC: 11.8 G/DL
HYALINE CASTS: 0 /LPF
IMM GRANULOCYTES NFR BLD AUTO: 0.5 %
KETONES URINE: NEGATIVE
LEUKOCYTE ESTERASE URINE: ABNORMAL
LYMPHOCYTES # BLD AUTO: 1.54 K/UL
LYMPHOCYTES NFR BLD AUTO: 36.8 %
MAN DIFF?: NORMAL
MCHC RBC-ENTMCNC: 28 PG
MCHC RBC-ENTMCNC: 30.2 GM/DL
MCV RBC AUTO: 92.7 FL
MICROSCOPIC-UA: NORMAL
MONOCYTES # BLD AUTO: 0.2 K/UL
MONOCYTES NFR BLD AUTO: 4.8 %
NEUTROPHILS # BLD AUTO: 2.38 K/UL
NEUTROPHILS NFR BLD AUTO: 56.7 %
NITRITE URINE: NEGATIVE
PH URINE: 6.5
PLATELET # BLD AUTO: 205 K/UL
POTASSIUM SERPL-SCNC: 4 MMOL/L
PROT SERPL-MCNC: 8.2 G/DL
PROT UR-MCNC: 36 MG/DL
PROTEIN URINE: ABNORMAL
RBC # BLD: 4.22 M/UL
RBC # FLD: 14.1 %
RED BLOOD CELLS URINE: 5 /HPF
SODIUM SERPL-SCNC: 138 MMOL/L
SPECIFIC GRAVITY URINE: 1.03
SQUAMOUS EPITHELIAL CELLS: 8 /HPF
UROBILINOGEN URINE: NORMAL
WBC # FLD AUTO: 4.19 K/UL
WHITE BLOOD CELLS URINE: 5 /HPF

## 2020-01-16 LAB — G6PD SER-CCNC: 16.1 U/G HGB

## 2020-02-11 ENCOUNTER — APPOINTMENT (OUTPATIENT)
Dept: RHEUMATOLOGY | Facility: CLINIC | Age: 25
End: 2020-02-11

## 2020-02-25 ENCOUNTER — APPOINTMENT (OUTPATIENT)
Dept: NEUROLOGY | Facility: CLINIC | Age: 25
End: 2020-02-25
Payer: COMMERCIAL

## 2020-02-25 VITALS
HEART RATE: 83 BPM | OXYGEN SATURATION: 98 % | BODY MASS INDEX: 18.48 KG/M2 | WEIGHT: 115 LBS | TEMPERATURE: 98.9 F | RESPIRATION RATE: 18 BRPM | DIASTOLIC BLOOD PRESSURE: 60 MMHG | SYSTOLIC BLOOD PRESSURE: 110 MMHG | HEIGHT: 66 IN

## 2020-02-25 DIAGNOSIS — G43.109 MIGRAINE WITH AURA, NOT INTRACTABLE, W/OUT STATUS MIGRAINOSUS: ICD-10-CM

## 2020-02-25 PROCEDURE — 99244 OFF/OP CNSLTJ NEW/EST MOD 40: CPT

## 2020-02-25 RX ORDER — PYRIDOXINE HCL (VITAMIN B6) 50 MG
50 TABLET ORAL DAILY
Refills: 0 | Status: DISCONTINUED | COMMUNITY
Start: 2019-09-20 | End: 2020-02-25

## 2020-02-25 RX ORDER — OMEPRAZOLE 20 MG/1
20 CAPSULE, DELAYED RELEASE ORAL DAILY
Qty: 30 | Refills: 5 | Status: DISCONTINUED | COMMUNITY
Start: 2019-09-30 | End: 2020-02-25

## 2020-02-25 RX ORDER — ASCORBIC ACID 125 MG
5000 TABLET,CHEWABLE ORAL
Refills: 0 | Status: DISCONTINUED | COMMUNITY
Start: 2019-10-02 | End: 2020-02-25

## 2020-02-25 RX ORDER — CLOTRIMAZOLE 10 MG/1
10 LOZENGE ORAL DAILY
Qty: 70 | Refills: 0 | Status: DISCONTINUED | COMMUNITY
Start: 2019-09-23 | End: 2020-02-25

## 2020-02-25 RX ORDER — PREDNISONE 5 MG/1
5 TABLET ORAL
Qty: 180 | Refills: 1 | Status: DISCONTINUED | COMMUNITY
Start: 2020-01-14 | End: 2020-02-25

## 2020-02-25 RX ORDER — SUMATRIPTAN 25 MG/1
25 TABLET, FILM COATED ORAL
Qty: 1 | Refills: 2 | Status: ACTIVE | COMMUNITY
Start: 2020-02-25 | End: 1900-01-01

## 2020-02-25 RX ORDER — FOLIC ACID 1 MG/1
1 TABLET ORAL DAILY
Refills: 0 | Status: DISCONTINUED | COMMUNITY
Start: 2019-09-20 | End: 2020-02-25

## 2020-02-25 NOTE — CONSULT LETTER
[Dear  ___] : Dear  [unfilled], [FreeTextEntry1] : \par \par Thank you very much for allowing me to participate in the care of your patient. Please don't hesitate to contact me with any questions or concerns. \par \par Sincerely,\par Rodriguez Domingo MD\par Neurology\par [facility]\par [address]\[place] [address]\par Tel: [phone]\par Email: [email or web address]\par \par

## 2020-02-25 NOTE — PHYSICAL EXAM
[General Appearance - Alert] : alert [Person] : oriented to person [Oriented To Time, Place, And Person] : oriented to person, place, and time [Time] : oriented to time [Short Term Intact] : short term memory intact [Place] : oriented to place [Naming Objects] : no difficulty naming common objects [Span Intact] : the attention span was normal [Current Events] : adequate knowledge of current events [Cranial Nerves Optic (II)] : visual acuity intact bilaterally,  visual fields full to confrontation, pupils equal round and reactive to light [Fluency] : fluency intact [Cranial Nerves Facial (VII)] : face symmetrical [Cranial Nerves Trigeminal (V)] : facial sensation intact symmetrically [Cranial Nerves Oculomotor (III)] : extraocular motion intact [Cranial Nerves Vestibulocochlear (VIII)] : hearing was intact bilaterally [Cranial Nerves Accessory (XI - Cranial And Spinal)] : head turning and shoulder shrug symmetric [Cranial Nerves Glossopharyngeal (IX)] : tongue and palate midline [Motor Tone] : muscle tone was normal in all four extremities [Cranial Nerves Hypoglossal (XII)] : there was no tongue deviation with protrusion [Motor Strength] : muscle strength was normal in all four extremities [Sensation Tactile Decrease] : light touch was intact [Coordination - Dysmetria Impaired Finger-to-Nose Bilateral] : not present [2+] : Patella left 2+ [Extraocular Movements] : extraocular movements were intact [Optic Disc Abnormality] : the optic disc were normal in size and color [Hearing Threshold Finger Rub Not [place]] : hearing was normal [Full Pulse] : the pedal pulses are present [] : no rash [Abnormal Walk] : normal gait

## 2020-02-25 NOTE — HISTORY OF PRESENT ILLNESS
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 Her presentation involves decreased movement of her eye muscles. It responded to prednisone.

## 2020-02-25 NOTE — DISCUSSION/SUMMARY
[FreeTextEntry1] : 24-year-old woman who is here for initial consultation of migraine with aura. Will try Imitrex p.r.n. Will check MRI of the brain with and without contrast to evaluate for any signs of lupus cerebritis. She was advised to continue following up with her ophthalmologist given her history of orbital myositis in the past. No fevers or constitutional symptoms to suggest an inflammation, as patient is on prednisone. She will follow up with me after the study is completed.\par \par More than 50% of time spent on counseling and educate patient on differential, workup, disease course, and treatment/management. Education was provided to the patient during this encounter. All questions and concerns were answered and addressed in detail. The patient verbalized understanding and agreed to plan. Patient was advised to continue to monitor for neurologic symptoms and to notify my office or go to the nearest emergency room if there are any changes. \par Side effects of the above medications were discussed in detail including but not limited to applicable black box warning and teratogenicity as appropriate. \par Patient was advised to bring previous records to my office. \par \par

## 2020-03-20 RX ORDER — PREDNISONE 10 MG/1
10 TABLET ORAL
Qty: 60 | Refills: 1 | Status: ACTIVE | COMMUNITY
Start: 1900-01-01 | End: 1900-01-01

## 2020-03-23 ENCOUNTER — TRANSCRIPTION ENCOUNTER (OUTPATIENT)
Age: 25
End: 2020-03-23

## 2020-03-30 ENCOUNTER — TRANSCRIPTION ENCOUNTER (OUTPATIENT)
Age: 25
End: 2020-03-30

## 2020-03-31 ENCOUNTER — APPOINTMENT (OUTPATIENT)
Dept: NEUROLOGY | Facility: CLINIC | Age: 25
End: 2020-03-31

## 2020-05-02 LAB
FERRITIN SERPL-MCNC: 74 NG/ML
FOLATE SERPL-MCNC: >20 NG/ML
IRON SATN MFR SERPL: 26 %
IRON SERPL-MCNC: 67 UG/DL
TIBC SERPL-MCNC: 256 UG/DL
TSH SERPL-ACNC: 3.37 UIU/ML
UIBC SERPL-MCNC: 189 UG/DL
VIT B12 SERPL-MCNC: 359 PG/ML

## 2020-06-02 ENCOUNTER — APPOINTMENT (OUTPATIENT)
Dept: NEUROLOGY | Facility: CLINIC | Age: 25
End: 2020-06-02

## 2020-07-08 NOTE — PROVIDER CONTACT NOTE (OTHER) - DATE AND TIME:
Airway  Performed by: Javier Merchant AAS  Authorized by: Neo Aldridge MD     Final Airway Type:  Endotracheal airway  Final Endotracheal Airway*:  ETT  ETT Size (mm)*:  7.5  Cuff*:  Regular  Technique Used for Successful ETT Placement:  Direct laryngoscopy  Devices/Methods Used in Placement*:  Mask  Intubation Procedure*:  Preoxygenation, ETCO2, Atraumatic, Dentition Unchanged and Phaynx Clear  Insertion Site:  Oral  Blade Type*:  MAC  Blade Size*:  3  Cuff Volume (mL):  10  Measured from*:  Teeth  Secured at (cm)*:  23  Placement Verified by: auscultation, capnometry and equal breath sounds    Glottic View*:  1 - full view of glottis  Attempts*:  1  Ventilation Between Attempts:  None  Number of Other Approaches Attempted:  0   Patient Identified, Procedure confirmed, Emergency equipment available and Safety protocols followed  Location:  OR  Urgency:  Elective  Difficult Airway: No    Indications for Airway Management:  Anesthesia  Sedation Level:  Anesthetized  Mask Difficulty Assessment:  1 - vent by mask  Performed By:  REYNALDO  AA:  Javier Merchant AAS   Easy mask; ETT passed easily        
22-May-2019 06:40

## 2020-07-17 NOTE — ED PROVIDER NOTE - NS ED ATTENDING STATEMENT MOD
Normal I have personally seen and examined this patient.  I have fully participated in the care of this patient. I have reviewed all pertinent clinical information, including history, physical exam, plan and the Resident’s note and agree except as noted.

## 2020-10-26 ENCOUNTER — TRANSCRIPTION ENCOUNTER (OUTPATIENT)
Age: 25
End: 2020-10-26

## 2020-11-04 NOTE — PATIENT PROFILE ADULT. - NS PRO MODE OF ARRIVAL
Instructions: This plan will send the code FBSE to the PM system.  DO NOT or CHANGE the price.
Price (Do Not Change): 0.00
Detail Level: Simple
ambulatory

## 2020-12-21 PROBLEM — Z86.19 HISTORY OF CANDIDAL VULVOVAGINITIS: Status: RESOLVED | Noted: 2019-04-25 | Resolved: 2020-12-21

## 2020-12-28 ENCOUNTER — APPOINTMENT (OUTPATIENT)
Dept: RHEUMATOLOGY | Facility: CLINIC | Age: 25
End: 2020-12-28
Payer: COMMERCIAL

## 2020-12-28 VITALS
BODY MASS INDEX: 19.61 KG/M2 | TEMPERATURE: 94.9 F | DIASTOLIC BLOOD PRESSURE: 80 MMHG | SYSTOLIC BLOOD PRESSURE: 117 MMHG | WEIGHT: 122 LBS | HEIGHT: 66 IN | RESPIRATION RATE: 14 BRPM | OXYGEN SATURATION: 99 % | HEART RATE: 92 BPM

## 2020-12-28 DIAGNOSIS — R21 RASH AND OTHER NONSPECIFIC SKIN ERUPTION: ICD-10-CM

## 2020-12-28 LAB
25(OH)D3 SERPL-MCNC: 32 NG/ML
ALBUMIN SERPL ELPH-MCNC: 3.9 G/DL
ALP BLD-CCNC: 91 U/L
ALT SERPL-CCNC: 11 U/L
ANION GAP SERPL CALC-SCNC: 8 MMOL/L
AST SERPL-CCNC: 24 U/L
BASOPHILS # BLD AUTO: 0.01 K/UL
BASOPHILS NFR BLD AUTO: 0.3 %
BILIRUB SERPL-MCNC: 0.4 MG/DL
BUN SERPL-MCNC: 8 MG/DL
C3 SERPL-MCNC: 96 MG/DL
C4 SERPL-MCNC: 14 MG/DL
CALCIUM SERPL-MCNC: 9 MG/DL
CHLORIDE SERPL-SCNC: 101 MMOL/L
CK SERPL-CCNC: 34 U/L
CO2 SERPL-SCNC: 30 MMOL/L
CREAT SERPL-MCNC: 0.81 MG/DL
EOSINOPHIL # BLD AUTO: 0.14 K/UL
EOSINOPHIL NFR BLD AUTO: 4.7 %
HCT VFR BLD CALC: 44.2 %
HGB BLD-MCNC: 13.3 G/DL
IMM GRANULOCYTES NFR BLD AUTO: 0.3 %
LYMPHOCYTES # BLD AUTO: 1.02 K/UL
LYMPHOCYTES NFR BLD AUTO: 34.3 %
MAN DIFF?: NORMAL
MCHC RBC-ENTMCNC: 28.6 PG
MCHC RBC-ENTMCNC: 30.1 GM/DL
MCV RBC AUTO: 95.1 FL
MONOCYTES # BLD AUTO: 0.13 K/UL
MONOCYTES NFR BLD AUTO: 4.4 %
NEUTROPHILS # BLD AUTO: 1.66 K/UL
NEUTROPHILS NFR BLD AUTO: 56 %
PLATELET # BLD AUTO: 208 K/UL
POTASSIUM SERPL-SCNC: 4.1 MMOL/L
PROT SERPL-MCNC: 8 G/DL
RBC # BLD: 4.65 M/UL
RBC # FLD: 13.4 %
SODIUM SERPL-SCNC: 139 MMOL/L
TSH SERPL-ACNC: 4.01 UIU/ML
WBC # FLD AUTO: 2.97 K/UL

## 2020-12-28 PROCEDURE — 99214 OFFICE O/P EST MOD 30 MIN: CPT

## 2020-12-28 PROCEDURE — 99072 ADDL SUPL MATRL&STAF TM PHE: CPT

## 2020-12-28 RX ORDER — HYDROXYCHLOROQUINE SULFATE 200 MG/1
200 TABLET, FILM COATED ORAL DAILY
Qty: 180 | Refills: 3 | Status: ACTIVE | COMMUNITY
Start: 2020-03-20 | End: 1900-01-01

## 2020-12-28 RX ORDER — PREDNISONE 5 MG/1
5 TABLET ORAL DAILY
Qty: 90 | Refills: 3 | Status: ACTIVE | COMMUNITY
Start: 2020-10-26 | End: 1900-01-01

## 2020-12-29 LAB
APTT BLD: 33.1 SEC
CONFIRM: 30.5 SEC
DRVVT IMM 1:2 NP PPP: NORMAL
DRVVT SCREEN TO CONFIRM RATIO: 0.95 RATIO
DSDNA AB SER-ACNC: 211 IU/ML
ENA RNP AB SER IA-ACNC: 4.8 AL
ENA SM AB SER IA-ACNC: 2.9 AL
ENA SS-A AB SER IA-ACNC: >8 AL
ENA SS-B AB SER IA-ACNC: 0.6 AL
INR PPP: 0.99 RATIO
PT BLD: 11.7 SEC
SARS-COV-2 IGG SERPL IA-ACNC: 2.08 INDEX
SARS-COV-2 IGG SERPL QL IA: POSITIVE
SCREEN DRVVT: 32.2 SEC
SILICA CLOTTING TIME INTERPRETATION: NORMAL
SILICA CLOTTING TIME S/C: 0.88 RATIO

## 2020-12-30 LAB
B2 GLYCOPROT1 IGA SERPL IA-ACNC: 80.8 SAU
B2 GLYCOPROT1 IGG SER-ACNC: <5 SGU
B2 GLYCOPROT1 IGM SER-ACNC: 5.5 SMU
CARDIOLIPIN IGM SER-MCNC: 10.3 GPL
CARDIOLIPIN IGM SER-MCNC: 11 MPL
DEPRECATED CARDIOLIPIN IGA SER: 7.6 APL

## 2021-01-27 ENCOUNTER — NON-APPOINTMENT (OUTPATIENT)
Age: 26
End: 2021-01-27

## 2021-01-27 DIAGNOSIS — Z87.42 PERSONAL HISTORY OF OTHER DISEASES OF THE FEMALE GENITAL TRACT: ICD-10-CM

## 2021-01-27 RX ORDER — CEFEPIME 100 G/1
INJECTION, POWDER, FOR SOLUTION INTRAVENOUS
Refills: 0 | Status: ACTIVE | COMMUNITY

## 2021-01-27 RX ORDER — ENOXAPARIN SODIUM 300 MG/3ML
INJECTION INTRAVENOUS; SUBCUTANEOUS
Refills: 0 | Status: ACTIVE | COMMUNITY

## 2021-03-15 ENCOUNTER — TRANSCRIPTION ENCOUNTER (OUTPATIENT)
Age: 26
End: 2021-03-15

## 2021-03-29 ENCOUNTER — APPOINTMENT (OUTPATIENT)
Dept: RHEUMATOLOGY | Facility: CLINIC | Age: 26
End: 2021-03-29

## 2021-04-09 ENCOUNTER — APPOINTMENT (OUTPATIENT)
Dept: RHEUMATOLOGY | Facility: CLINIC | Age: 26
End: 2021-04-09

## 2021-04-09 VITALS
SYSTOLIC BLOOD PRESSURE: 109 MMHG | HEIGHT: 66 IN | OXYGEN SATURATION: 100 % | BODY MASS INDEX: 19.61 KG/M2 | HEART RATE: 92 BPM | TEMPERATURE: 97.5 F | RESPIRATION RATE: 16 BRPM | WEIGHT: 122 LBS | DIASTOLIC BLOOD PRESSURE: 72 MMHG

## 2021-04-09 NOTE — HISTORY OF PRESENT ILLNESS
[FreeTextEntry1] : Zohreh Aguero \par [birth year] \par  \par  \par Initial rheumatology evaluation 18. \par H/O lupus diagnosed in Summer 2017   + DNA >400, + Sm >8  + RNP, low complements, low WBC.  At the time she had generalized fatigue, joint pain and swelling.  She was given prednisone 10mg and plaquenil, however patient never took plaquenil and was very inconsistent with prednisone, she would take 2-3 times a month.  She has also had episodes of migraine since summer of 2017 but doesn’t take any medications. \par Admitted to hospital in 2018 with symptoms of fever 102-103 intermittent for a week, fatigue, arthalgias.  Initially treated for presumed UTI but all cultures were negative.   Pancytopenia noted WBC 2.9, Plt 85, Hgb 9.4  Hct 31, KENAN negative, ,  C3  45, C4 6.  She was treated with prednisone 60mg daily and discharged on a taper of 10mg every week. \par Today she is taking prednisone 50mg daily for a week and plaquenil 400mg. \par She does not have any pain today but feels tired and generalized fatigue with a feeling that someone is sitting on her chest.   \par She denies any rashes or photosensitivity, no oral ulcers, no Raynauds.  She lost about 10 lbs during the weeks preceding her hospital admission, had low appetite at the time.  Now she has a good appetite and gaining weight.  Peripheral swelling and pain resolved.  She has had diffuse hair thinning since last summer. 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  She would like to resume work and school this September. \par  \par LMP  (usually regular) \par  \par No toxic habits \par  \par Not sexually active at the time.   \par 1 pregnancy, 1  \par  \par No family history of lupus or other CTD \par  \par Height 5’5”  weight 112.4 \par /64  P 97  Pox 99  T 97.2 \par No rashes \par No oral or nasal ulcers \par No cervical adenopathy \par Lungs CTAB \par CV RR S1/S2 \par Abdomen: soft, non tender, no organomegaly \par No peripheral swelling, FROM hands, wrists, elbows, shoulders, knees and ankles.  No pain on exam. \par Hips FROM \par Normal muscle strength upper and lower extremities \par Neuro: non focal \par  \par  \par A/P: lupus s/p recent flare arthritis/ thrombocytopenia/ fever now clinically improved.  Discussed at length treatment and expectations.   She will continue on plaquenil 400mg and continue taper of prednisone 10mg every week.  RTC in 4 weeks at 10mg prednisone daily. \par She will go to a support group meeting this afternoon. \par Check labs today. \par Introduced therapeutic trials and specimen bank.  To discuss more next visit. \par Discussed vaccinations. \par  \par 18  Zohreh feels well.  She has been compliant with plaquenil 400mg and has decreased prednisone to 10mg for the past 2 weeks.  She has occasional arthalgias/myalgias, but usually resolves within same day and she doesn’t take any NSAIDs, no Tylenol. \par She denies any rashes, no oral or nasal ulcers, no weight loss, no Raynauds, no sicca symptoms. \par She has been experiencing headaches but much milder than previously.  1-2 per week and also usually improves after laying down, resting. 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 \par  \par Weight 117  /73  P 100  Pox 98  T 99 \par No rashes \par No oral or nasal ulcers \par No cervical adenopathy \par Lungs CTAB \par Cv RR S1/S2 \par No peripheral swelling, FROM hands, wrists, elbows, shoulders, knees and ankles.  No pain on mobilization or palpation. \par  \par Labs reviewed \par  \par A/P: stable lupus.  Continue plaquenil 400mg daily.  Taper prednisone to 5mg daily then after 10 days further taper to 2.5mg.  RTC In 9 weeks.  Check labs today \par  \par 18  She feels well and has no complaints.  She hasn’t had significant joint pain.  Occasional pain and swelling of digits but improves on same or next day.  She hasn’t taken any NSAIDs or Tylenol.  Compliant with prednisone 2.5mg daily and not so compliant with plaquenil.  She takes it 2-3 times a week.  She feels she has to have some food when taking meds and not eating as consistently.  No weight loss.  No rashes, no oral or nasal ulcers. \par She travelled to [place] recently for 1 week as a graduation present.  She had a great time, snorkeling, kayaking.  She wore sunscreen and protective clothing. \par Weight 118  BP 97/66  P 98  T 98.1 \par No rashes \par No oral or nasal ulcers \par No cervical adenopathy \par Lungs CTAB \par CV RR S1/S2 \par Abdomen: soft, non tender \par No peripheral swelling, FROM hands ,wrists, elbows, shoulders, knees and ankles.  No pain on exam. \par Neuro: non focal \par  \par Labs reviewed \par  \par A/P: stable lupus, no signs of disease activity.  Continue prednisone 2.5mg.  Discussed about taking plaquenil more consistently and alternating 200mg with 400mg every other day.  Check labs today.  RTC in 3-4 months. \par Plan on discontinuing prednisone next visit or alternate first. \par  \par 10/12/18  Zohreh feels well and has no complaints.  She recently travelled to [place] for her birthday with a friend.  She stopped taking prednisone 3 months ago and occasionally takes plaquenil once a week or less.  She has had a couple of episodes of knee pain or 3rd PIP right hand pain with swelling.  It will self resolve in a day or two, not taking any NSAIDs.  She denies nausea, vomiting no fever, no rashes, no oral ulcers, no GI or  complaints. \par  \par LMP end of September \par  \par Weight 120  /71  P 97  Pox 98  T 98 \par No rashes \par No oral or nasal ulcers \par No cervical adenopathy \par Lungs CTAB \par CV RR S1/S2 \par Abdomen: soft, non tender, no organomegaly \par No peripheral swelling, FROM hands, wrists, elbows, shoulders, knees and ankles. No pain on exam. \par  \par A/P: clinically stable lupus.  Possible some mild flares with arthritis, self limited.  Likely due to non compliance with plaquenil.  Discussed importance to take plaquenil regularly to decrease her risk of flares.  She understands.  Alternate 200mg and 400mg.  Off prednisone for now.  Check labs today;.  RTC in 5 months. \par She asked for a referral to nutritionist, due to concerns of low body weight, however gained almost 10 lbs since first visit in 2018.  Planning a trip to [place] in Dec and taking classes at [employer] for . \par  \par 18 Zohreh experienced a couple of episodes of headaches since the weekend, unprovoked, usually right frontal and associated with episodes of blurry vision both eyes.  She went to ED yesterday after experiencing another episode of blurry vision and headache, she was given 40mg prednisone once and told to take Excedrin or motrin.  She didn’t have any blood tests.  She denies fever, no rashes, no oral ulcers, no chest pain or SOB.  No recent illness or exposed to sick people.  She has had rare episodes of headaches in the past.  She had on/off pain in knees recently with swelling.  She started taking plaquenil 400mg daily for the past 1 or 2 weeks and before was being inconsistent with plaquenil.  No new medication.  She is concerned headaches might be secondary to stress, she has been working 2 jobs recently. \par weight 119.2  /79  P 82  Pox 97  T 97.2 \par no rashes \par no oral or nasal ulcers \par subcentimeter lymphonodes right and left cervical area, non tender, mobile. \par Lungs CTAB \par CV RR S1/S2 \par Abdomen: soft non tender \par Neuro exam: non focal \par Normal pupilary reflexes and extra ocular muscle testing \par Normal muscle strength upper and lower extremities, normal reflexes \par   \par A/P: Recent episodes of headache/migraine.  No evidence from exam suggestive of active arthritis or neurological involvement.  No blurry vision today.  She has appt today with ophthalmology.  Recommend NSAIDs PRN when headache.  Decrease computer screen time if possible.  Continue plaquenil 400mg daily.  Check labs today, low suspicion for lupus flare.  Continue off steroids. \par  \par 19  Zohreh walked in for recent fever, chest pain/coughing, she went to urgent care on Tue and given Levoquin.  She still had an episode of fever on Wednesday but not since.  She Feels better today, no more fever, no coughing, no joint pain.  She has been taking plaquenil 400mg.  She had one episode of headache since last time and was seen by ophthalmology, no changes recommended. \par Weight 121  /73  P 101  Pox 100  T 99.2 \par No rashes \par No oral ulcers, no erythema \par Lungs CTAB \par CV RR S1/S2 \par No peripheral swelling, no synovitis. \par  \par A/P: stable lupus, suspect recent URI.  Recommend completing levofloxacin.  Continue plaquenil 400mg.  labs from urgent care CBC and chemistries no significant abnormalities, mild anemia.  RTC In 3 months. \par  \par 19  Zohreh has been having some intermittent epigastric discomfort and sometimes doesn’t eat as well because she is scared of what food might make her feel.  She usually only has one meal a day but trying to eat healthy.  She has seen a gastroenterologist and is scheduled for an upper endoscopy next week and prescribed omeprazole which she took for the first time today. \par She also experiences intermittent joint pain, 5 days ago in 2nd digit left hand, with mild swelling.  No pain today. \par She also has some more prominent lacy reticular rash in left thigh, no pain, no paresthesias. \par Intermittent swelling of feet, usually towards end of day, specially when working after sitting for a long time at work. \par weight 115  /71  P 116  Pox 100  T 97.6 \par faint reticular hyperpigmentation left thigh. \par No oral ulcers \par No cervical adenopathy \par Lungs CTAB \par CV RR S1/S2 \par No peripheral swelling, FROM hands, wrists, elbows, shoulders, knees and ankles.  No tenderness on palpation or mobilization joints above.   \par Neuro: non focal \par  \par A/P: lupus stable, no clear evidence of active disease.  We will check labs today. \par Discussed about more frequent meals, healthy dieting, appropriate intake of plaquenil, compliance with omeprazole. \par Exercise and avoid prolonged periods of sitting.  Stretching. \par Lacy hyperpigmentation left thigh, f/u derm, unlikely lupus, possibly from plaquenil? \par Topical diclofenac PRN. \par RTC in 3 months. \par  \par 19 Zohreh was admitted to the hospital on  for fever and positive blood culture she had at her PMD on .  She was noted to have pancytopenia, fever and arthritis.  She was treated with prednisone 20mg and discharged feeling much better on 19.  She declined plaquenil and had stopped using plaquenil a couple of months back   MRI liver demonstrated a liver adnnoma.  CMV was positive for IgM and IgG.  Viral PCR not done.   \par  \par 19  Zohreh missed a copule of appointments since she was discharged from hospital recently.  She called today complaining of more pain in hands, wrists.  She would like to come for blood tests.  I spoke with her about needing to come to office and beeing seen in person, we need to discuss DMARD therapy, I explained that my recommendation would be to restart plaquenil and talk about why she didn’t want to continue it.  Her flares in the past have always occurred after stopping plaquenil.  She would like to have flexibility to come in other days of the week or even in the afternoon, so I suggested I will check with Bisi Blevins and Philip if they could see her.  I refilled her prescription for prednisone 10mg daily but urged her to come next week. \par  \par 21 Zohreh had followed with Dr Veronica between  and . She has been chronically on prednisone 5mg daily and has not used plaquenil since . She states she has not experienced any flares since . She has tried to taper prednisone and alternate days but can’t tolerate since she feels very tired/fatigued on days she doesn’t take prednisone. She denies any rashes, no oral ulcers, no weight loss, no Raynauds. She has been taking vitamin/supplements and feels it helps with hair growth.  \par She denies any constitutional symptoms.  \par No rashes \par No oral or nasal ulcers \par No cervical adenopathy \par Lungs CTAB \par CV RR S1/S2 \par No peripheral swelling, no joint pain on palpation or mobilization, FROM hands, wrists, elbows, shoulders, knees and ankles.  \par  \par A/P: lupus clinically stable. Discussed about tapering prednisone slowly with 4mg then 3mg daily and restart plaquenil 100mg daily. After a few weeks, increase to 400mg daily and attempt to further taper prednisone with goal of discontinuing. \par Patient agreed with plan. Discussed prednisone risks of AVN, DM, Eye disease, HTN, weight gain. \par Check labs today. RTC in 3 months.  \par  \par  \par Ophthalmology evaluation 2018  Dec 2018

## 2021-04-29 ENCOUNTER — TRANSCRIPTION ENCOUNTER (OUTPATIENT)
Age: 26
End: 2021-04-29

## 2021-05-08 ENCOUNTER — TRANSCRIPTION ENCOUNTER (OUTPATIENT)
Age: 26
End: 2021-05-08

## 2021-06-01 NOTE — PATIENT PROFILE ADULT. - HEALTHCARE INFORMATION NEEDED, PROFILE
Has The Cancer Been Biopsied Before?: has been previously biopsied Who Is Your Referring Provider?: NAVYA Tatum When Was Your Biopsy?: 04/30/2021 Body Location Override (Optional): left lower jawline none

## 2021-06-16 NOTE — DISCHARGE NOTE ADULT - CARE PLAN
attempted to call patient back.       ----- Message from Dominique Kaiser sent at 6/16/2021 11:10 AM CDT -----  He returned your call and said if he (?) wants to call in a rx , send into [facility]      
Principal Discharge DX:	Arthralgia, unspecified joint  Goal:	appropriate management  Instructions for follow-up, activity and diet:	Followup with Dr. Sykes, rheumatologist in one week.

## 2021-07-16 ENCOUNTER — APPOINTMENT (OUTPATIENT)
Dept: RHEUMATOLOGY | Facility: CLINIC | Age: 26
End: 2021-07-16

## 2021-07-16 ENCOUNTER — LABORATORY RESULT (OUTPATIENT)
Age: 26
End: 2021-07-16

## 2021-07-16 VITALS
HEIGHT: 66 IN | OXYGEN SATURATION: 98 % | BODY MASS INDEX: 18.68 KG/M2 | HEART RATE: 106 BPM | DIASTOLIC BLOOD PRESSURE: 79 MMHG | WEIGHT: 116.25 LBS | SYSTOLIC BLOOD PRESSURE: 111 MMHG | TEMPERATURE: 97.6 F

## 2021-07-16 RX ORDER — PREDNISONE 1 MG/1
1 TABLET ORAL
Qty: 150 | Refills: 3 | Status: ACTIVE | COMMUNITY
Start: 2021-04-09 | End: 1900-01-01

## 2021-07-16 NOTE — HISTORY OF PRESENT ILLNESS
[FreeTextEntry1] : Zohreh Aguero \par [birth year] \par  \par  \par Initial rheumatology evaluation 18. \par H/O lupus diagnosed in Summer 2017   + DNA >400, + Sm >8  + RNP, low complements, low WBC.  At the time she had generalized fatigue, joint pain and swelling.  She was given prednisone 10mg and plaquenil, however patient never took plaquenil and was very inconsistent with prednisone, she would take 2-3 times a month.  She has also had episodes of migraine since summer of 2017 but doesn’t take any medications. \par Admitted to hospital in 2018 with symptoms of fever 102-103 intermittent for a week, fatigue, arthalgias.  Initially treated for presumed UTI but all cultures were negative.   Pancytopenia noted WBC 2.9, Plt 85, Hgb 9.4  Hct 31, KENAN negative, ,  C3  45, C4 6.  She was treated with prednisone 60mg daily and discharged on a taper of 10mg every week. \par Today she is taking prednisone 50mg daily for a week and plaquenil 400mg. \par She does not have any pain today but feels tired and generalized fatigue with a feeling that someone is sitting on her chest.   \par She denies any rashes or photosensitivity, no oral ulcers, no Raynauds.  She lost about 10 lbs during the weeks preceding her hospital admission, had low appetite at the time.  Now she has a good appetite and gaining weight.  Peripheral swelling and pain resolved.  She has had diffuse hair thinning since last summer. 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  She would like to resume work and school this September. \par  \par LMP  (usually regular) \par  \par No toxic habits \par  \par Not sexually active at the time.   \par 1 pregnancy, 1  \par  \par No family history of lupus or other CTD \par  \par Height 5’5”  weight 112.4 \par /64  P 97  Pox 99  T 97.2 \par No rashes \par No oral or nasal ulcers \par No cervical adenopathy \par Lungs CTAB \par CV RR S1/S2 \par Abdomen: soft, non tender, no organomegaly \par No peripheral swelling, FROM hands, wrists, elbows, shoulders, knees and ankles.  No pain on exam. \par Hips FROM \par Normal muscle strength upper and lower extremities \par Neuro: non focal \par  \par  \par A/P: lupus s/p recent flare arthritis/ thrombocytopenia/ fever now clinically improved.  Discussed at length treatment and expectations.   She will continue on plaquenil 400mg and continue taper of prednisone 10mg every week.  RTC in 4 weeks at 10mg prednisone daily. \par She will go to a support group meeting this afternoon. \par Check labs today. \par Introduced therapeutic trials and specimen bank.  To discuss more next visit. \par Discussed vaccinations. \par  \par 18  Zohreh feels well.  She has been compliant with plaquenil 400mg and has decreased prednisone to 10mg for the past 2 weeks.  She has occasional arthalgias/myalgias, but usually resolves within same day and she doesn’t take any NSAIDs, no Tylenol. \par She denies any rashes, no oral or nasal ulcers, no weight loss, no Raynauds, no sicca symptoms. \par She has been experiencing headaches but much milder than previously.  1-2 per week and also usually improves after laying down, resting. 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 \par  \par Weight 117  /73  P 100  Pox 98  T 99 \par No rashes \par No oral or nasal ulcers \par No cervical adenopathy \par Lungs CTAB \par Cv RR S1/S2 \par No peripheral swelling, FROM hands, wrists, elbows, shoulders, knees and ankles.  No pain on mobilization or palpation. \par  \par Labs reviewed \par  \par A/P: stable lupus.  Continue plaquenil 400mg daily.  Taper prednisone to 5mg daily then after 10 days further taper to 2.5mg.  RTC In 9 weeks.  Check labs today \par  \par 18  She feels well and has no complaints.  She hasn’t had significant joint pain.  Occasional pain and swelling of digits but improves on same or next day.  She hasn’t taken any NSAIDs or Tylenol.  Compliant with prednisone 2.5mg daily and not so compliant with plaquenil.  She takes it 2-3 times a week.  She feels she has to have some food when taking meds and not eating as consistently.  No weight loss.  No rashes, no oral or nasal ulcers. \par She travelled to [place] recently for 1 week as a graduation present.  She had a great time, snorkeling, kayaking.  She wore sunscreen and protective clothing. \par Weight 118  BP 97/66  P 98  T 98.1 \par No rashes \par No oral or nasal ulcers \par No cervical adenopathy \par Lungs CTAB \par CV RR S1/S2 \par Abdomen: soft, non tender \par No peripheral swelling, FROM hands ,wrists, elbows, shoulders, knees and ankles.  No pain on exam. \par Neuro: non focal \par  \par Labs reviewed \par  \par A/P: stable lupus, no signs of disease activity.  Continue prednisone 2.5mg.  Discussed about taking plaquenil more consistently and alternating 200mg with 400mg every other day.  Check labs today.  RTC in 3-4 months. \par Plan on discontinuing prednisone next visit or alternate first. \par  \par 10/12/18  Zohreh feels well and has no complaints.  She recently travelled to [place] for her birthday with a friend.  She stopped taking prednisone 3 months ago and occasionally takes plaquenil once a week or less.  She has had a couple of episodes of knee pain or 3rd PIP right hand pain with swelling.  It will self resolve in a day or two, not taking any NSAIDs.  She denies nausea, vomiting no fever, no rashes, no oral ulcers, no GI or  complaints. \par  \par LMP end of September \par  \par Weight 120  /71  P 97  Pox 98  T 98 \par No rashes \par No oral or nasal ulcers \par No cervical adenopathy \par Lungs CTAB \par CV RR S1/S2 \par Abdomen: soft, non tender, no organomegaly \par No peripheral swelling, FROM hands, wrists, elbows, shoulders, knees and ankles. No pain on exam. \par  \par A/P: clinically stable lupus.  Possible some mild flares with arthritis, self limited.  Likely due to non compliance with plaquenil.  Discussed importance to take plaquenil regularly to decrease her risk of flares.  She understands.  Alternate 200mg and 400mg.  Off prednisone for now.  Check labs today;.  RTC in 5 months. \par She asked for a referral to nutritionist, due to concerns of low body weight, however gained almost 10 lbs since first visit in 2018.  Planning a trip to [place] in Dec and taking classes at [employer] for . \par  \par 18 Zohreh experienced a couple of episodes of headaches since the weekend, unprovoked, usually right frontal and associated with episodes of blurry vision both eyes.  She went to ED yesterday after experiencing another episode of blurry vision and headache, she was given 40mg prednisone once and told to take Excedrin or motrin.  She didn’t have any blood tests.  She denies fever, no rashes, no oral ulcers, no chest pain or SOB.  No recent illness or exposed to sick people.  She has had rare episodes of headaches in the past.  She had on/off pain in knees recently with swelling.  She started taking plaquenil 400mg daily for the past 1 or 2 weeks and before was being inconsistent with plaquenil.  No new medication.  She is concerned headaches might be secondary to stress, she has been working 2 jobs recently. \par weight 119.2  /79  P 82  Pox 97  T 97.2 \par no rashes \par no oral or nasal ulcers \par subcentimeter lymphonodes right and left cervical area, non tender, mobile. \par Lungs CTAB \par CV RR S1/S2 \par Abdomen: soft non tender \par Neuro exam: non focal \par Normal pupilary reflexes and extra ocular muscle testing \par Normal muscle strength upper and lower extremities, normal reflexes \par   \par A/P: Recent episodes of headache/migraine.  No evidence from exam suggestive of active arthritis or neurological involvement.  No blurry vision today.  She has appt today with ophthalmology.  Recommend NSAIDs PRN when headache.  Decrease computer screen time if possible.  Continue plaquenil 400mg daily.  Check labs today, low suspicion for lupus flare.  Continue off steroids. \par  \par 19  Zohreh walked in for recent fever, chest pain/coughing, she went to urgent care on Tue and given Levoquin.  She still had an episode of fever on Wednesday but not since.  She Feels better today, no more fever, no coughing, no joint pain.  She has been taking plaquenil 400mg.  She had one episode of headache since last time and was seen by ophthalmology, no changes recommended. \par Weight 121  /73  P 101  Pox 100  T 99.2 \par No rashes \par No oral ulcers, no erythema \par Lungs CTAB \par CV RR S1/S2 \par No peripheral swelling, no synovitis. \par  \par A/P: stable lupus, suspect recent URI.  Recommend completing levofloxacin.  Continue plaquenil 400mg.  labs from urgent care CBC and chemistries no significant abnormalities, mild anemia.  RTC In 3 months. \par  \par 19  Zohreh has been having some intermittent epigastric discomfort and sometimes doesn’t eat as well because she is scared of what food might make her feel.  She usually only has one meal a day but trying to eat healthy.  She has seen a gastroenterologist and is scheduled for an upper endoscopy next week and prescribed omeprazole which she took for the first time today. \par She also experiences intermittent joint pain, 5 days ago in 2nd digit left hand, with mild swelling.  No pain today. \par She also has some more prominent lacy reticular rash in left thigh, no pain, no paresthesias. \par Intermittent swelling of feet, usually towards end of day, specially when working after sitting for a long time at work. \par weight 115  /71  P 116  Pox 100  T 97.6 \par faint reticular hyperpigmentation left thigh. \par No oral ulcers \par No cervical adenopathy \par Lungs CTAB \par CV RR S1/S2 \par No peripheral swelling, FROM hands, wrists, elbows, shoulders, knees and ankles.  No tenderness on palpation or mobilization joints above.   \par Neuro: non focal \par  \par A/P: lupus stable, no clear evidence of active disease.  We will check labs today. \par Discussed about more frequent meals, healthy dieting, appropriate intake of plaquenil, compliance with omeprazole. \par Exercise and avoid prolonged periods of sitting.  Stretching. \par Lacy hyperpigmentation left thigh, f/u derm, unlikely lupus, possibly from plaquenil? \par Topical diclofenac PRN. \par RTC in 3 months. \par  \par 19 Zohreh was admitted to the hospital on  for fever and positive blood culture she had at her PMD on .  She was noted to have pancytopenia, fever and arthritis.  She was treated with prednisone 20mg and discharged feeling much better on 19.  She declined plaquenil and had stopped using plaquenil a couple of months back   MRI liver demonstrated a liver adnnoma.  CMV was positive for IgM and IgG.  Viral PCR not done.   \par  \par 19  Zohreh missed a copule of appointments since she was discharged from hospital recently.  She called today complaining of more pain in hands, wrists.  She would like to come for blood tests.  I spoke with her about needing to come to office and beeing seen in person, we need to discuss DMARD therapy, I explained that my recommendation would be to restart plaquenil and talk about why she didn’t want to continue it.  Her flares in the past have always occurred after stopping plaquenil.  She would like to have flexibility to come in other days of the week or even in the afternoon, so I suggested I will check with Bisi Blevins and Philip if they could see her.  I refilled her prescription for prednisone 10mg daily but urged her to come next week. \par  \par 21 Zohreh had followed with Dr Veronica between  and . She has been chronically on prednisone 5mg daily and has not used plaquenil since . She states she has not experienced any flares since . She has tried to taper prednisone and alternate days but can’t tolerate since she feels very tired/fatigued on days she doesn’t take prednisone. She denies any rashes, no oral ulcers, no weight loss, no Raynauds. She has been taking vitamin/supplements and feels it helps with hair growth.  \par She denies any constitutional symptoms.  \par No rashes \par No oral or nasal ulcers \par No cervical adenopathy \par Lungs CTAB \par CV RR S1/S2 \par No peripheral swelling, no joint pain on palpation or mobilization, FROM hands, wrists, elbows, shoulders, knees and ankles.  \par  \par A/P: lupus clinically stable. Discussed about tapering prednisone slowly with 4mg then 3mg daily and restart plaquenil 100mg daily. After a few weeks, increase to 400mg daily and attempt to further taper prednisone with goal of discontinuing. \par Patient agreed with plan. Discussed prednisone risks of AVN, DM, Eye disease, HTN, weight gain. \par Check labs today. RTC in 3 months.  \par  \par 21Zohreh is here for follow up of lupus. She feels well and denies any rashes, no oral ulcers, no fever, no significant joint pain or swelling. She tried taking plaquenil for 1 month however she could not tolerate it again. She feels tired, fatigued all the time when taking plaquenil, and feels depressed. She doesn’t have any GI intolerance. She has had normal appetite, no GI or  complaints. She was able to taper prednisone to 3mg daily however 2 weeks ago she started having more generalized myalgia/ arthalgia with mild swelling of hands. TOday she has no swelling and no pain.  \par She increased prednisone to 5mg again 2 days ago and feels that it might have helped. 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 Still working as well. \par No rashes \par no oral or nasal ulcers \par No cervical adenopathy \par Lungs CTAB \par CV RR S1/S2 \par No peripheral swelling, FROM hands, wrists, elbows, shoulders, knees and ankles. No tenderness on palpation or moblization joints above.  \par  \par A/P Lupus clinically stable. Plaquenil as tolerated, discussed about trying alternating days, once every 2 days. COntinue prednisone 5mg for 1 week, then attempt to decrease again as tolerated.  \par She has been taking vit D 50kIU weekly. Check labs today. RTC In 3 months.  \par  \par  \par Ophthalmology evaluation 2018  Dec 2018

## 2021-08-25 NOTE — ED PROVIDER NOTE - ENMT, MLM
Consent: The patient's consent was obtained including but not limited to risks of crusting, scabbing, blistering, scarring, darker or lighter pigmentary change, recurrence, incomplete removal and infection. Render Post-Care Instructions In Note?: no Show Applicator Variable?: Yes Medical Necessity Information: It is in your best interest to select a reason for this procedure from the list below. All of these items fulfill various CMS LCD requirements except the new and changing color options. Medical Necessity Clause: This procedure was medically necessary because the lesions that were treated were: Detail Level: Detailed Post-Care Instructions: I reviewed with the patient in detail post-care instructions. Patient is to wear sunprotection, and avoid picking at any of the treated lesions. Pt may apply Vaseline to crusted or scabbing areas. Airway patent, Nasal mucosa clear. Mouth with normal mucosa. Throat has no vesicles, no oropharyngeal exudates and uvula is midline.

## 2021-10-22 ENCOUNTER — APPOINTMENT (OUTPATIENT)
Dept: RHEUMATOLOGY | Facility: CLINIC | Age: 26
End: 2021-10-22

## 2021-10-22 VITALS
WEIGHT: 118 LBS | TEMPERATURE: 97.8 F | BODY MASS INDEX: 18.96 KG/M2 | DIASTOLIC BLOOD PRESSURE: 68 MMHG | HEIGHT: 66 IN | OXYGEN SATURATION: 99 % | SYSTOLIC BLOOD PRESSURE: 104 MMHG | HEART RATE: 76 BPM

## 2021-10-22 RX ORDER — HYDROXYCHLOROQUINE SULFATE 200 MG/1
200 TABLET, FILM COATED ORAL
Qty: 30 | Refills: 7 | Status: ACTIVE | COMMUNITY
Start: 2021-04-09 | End: 1900-01-01

## 2021-11-22 ENCOUNTER — INPATIENT (INPATIENT)
Facility: HOSPITAL | Age: 26
LOS: 1 days | Discharge: HOME | DRG: 315 | End: 2021-11-24
Attending: INTERNAL MEDICINE | Admitting: INTERNAL MEDICINE
Payer: COMMERCIAL

## 2021-11-22 VITALS
WEIGHT: 119.05 LBS | TEMPERATURE: 98 F | HEART RATE: 78 BPM | OXYGEN SATURATION: 100 % | RESPIRATION RATE: 17 BRPM | SYSTOLIC BLOOD PRESSURE: 66 MMHG | HEIGHT: 66 IN | DIASTOLIC BLOOD PRESSURE: 35 MMHG

## 2021-11-22 DIAGNOSIS — M32.9 SYSTEMIC LUPUS ERYTHEMATOSUS, UNSPECIFIED: ICD-10-CM

## 2021-11-22 DIAGNOSIS — E87.6 HYPOKALEMIA: ICD-10-CM

## 2021-11-22 DIAGNOSIS — R11.2 NAUSEA WITH VOMITING, UNSPECIFIED: ICD-10-CM

## 2021-11-22 LAB
ALBUMIN SERPL ELPH-MCNC: 2.7 G/DL — LOW (ref 3.3–5)
ALBUMIN SERPL ELPH-MCNC: 3.7 G/DL — SIGNIFICANT CHANGE UP (ref 3.3–5)
ALP SERPL-CCNC: 61 U/L — SIGNIFICANT CHANGE UP (ref 40–120)
ALP SERPL-CCNC: 81 U/L — SIGNIFICANT CHANGE UP (ref 40–120)
ALT FLD-CCNC: 14 U/L — SIGNIFICANT CHANGE UP (ref 10–45)
ALT FLD-CCNC: 19 U/L — SIGNIFICANT CHANGE UP (ref 10–45)
ANION GAP SERPL CALC-SCNC: 10 MMOL/L — SIGNIFICANT CHANGE UP (ref 5–17)
ANION GAP SERPL CALC-SCNC: 10 MMOL/L — SIGNIFICANT CHANGE UP (ref 5–17)
ANION GAP SERPL CALC-SCNC: 20 MMOL/L — HIGH (ref 5–17)
APPEARANCE UR: ABNORMAL
AST SERPL-CCNC: 33 U/L — SIGNIFICANT CHANGE UP (ref 10–40)
AST SERPL-CCNC: 49 U/L — HIGH (ref 10–40)
BACTERIA # UR AUTO: NEGATIVE — SIGNIFICANT CHANGE UP
BASE EXCESS BLDV CALC-SCNC: -3.2 MMOL/L — LOW (ref -2–2)
BASE EXCESS BLDV CALC-SCNC: -5.6 MMOL/L — LOW (ref -2–2)
BASE EXCESS BLDV CALC-SCNC: -7.3 MMOL/L — LOW (ref -2–2)
BASOPHILS # BLD AUTO: 0.02 K/UL — SIGNIFICANT CHANGE UP (ref 0–0.2)
BASOPHILS NFR BLD AUTO: 0.3 % — SIGNIFICANT CHANGE UP (ref 0–2)
BILIRUB SERPL-MCNC: 0.1 MG/DL — LOW (ref 0.2–1.2)
BILIRUB SERPL-MCNC: 0.2 MG/DL — SIGNIFICANT CHANGE UP (ref 0.2–1.2)
BILIRUB UR-MCNC: NEGATIVE — SIGNIFICANT CHANGE UP
BUN SERPL-MCNC: 10 MG/DL — SIGNIFICANT CHANGE UP (ref 7–23)
BUN SERPL-MCNC: 10 MG/DL — SIGNIFICANT CHANGE UP (ref 7–23)
BUN SERPL-MCNC: 11 MG/DL — SIGNIFICANT CHANGE UP (ref 7–23)
CA-I SERPL-SCNC: 1.07 MMOL/L — LOW (ref 1.15–1.33)
CA-I SERPL-SCNC: 1.21 MMOL/L — SIGNIFICANT CHANGE UP (ref 1.15–1.33)
CA-I SERPL-SCNC: 1.31 MMOL/L — SIGNIFICANT CHANGE UP (ref 1.15–1.33)
CALCIUM SERPL-MCNC: 7.2 MG/DL — LOW (ref 8.4–10.5)
CALCIUM SERPL-MCNC: 7.3 MG/DL — LOW (ref 8.4–10.5)
CALCIUM SERPL-MCNC: 9 MG/DL — SIGNIFICANT CHANGE UP (ref 8.4–10.5)
CHLORIDE BLDV-SCNC: 103 MMOL/L — SIGNIFICANT CHANGE UP (ref 96–108)
CHLORIDE BLDV-SCNC: 110 MMOL/L — HIGH (ref 96–108)
CHLORIDE BLDV-SCNC: 110 MMOL/L — HIGH (ref 96–108)
CHLORIDE SERPL-SCNC: 103 MMOL/L — SIGNIFICANT CHANGE UP (ref 96–108)
CHLORIDE SERPL-SCNC: 109 MMOL/L — HIGH (ref 96–108)
CHLORIDE SERPL-SCNC: 110 MMOL/L — HIGH (ref 96–108)
CO2 BLDV-SCNC: 22 MMOL/L — SIGNIFICANT CHANGE UP (ref 22–26)
CO2 BLDV-SCNC: 24 MMOL/L — SIGNIFICANT CHANGE UP (ref 22–26)
CO2 BLDV-SCNC: 25 MMOL/L — SIGNIFICANT CHANGE UP (ref 22–26)
CO2 SERPL-SCNC: 17 MMOL/L — LOW (ref 22–31)
CO2 SERPL-SCNC: 18 MMOL/L — LOW (ref 22–31)
CO2 SERPL-SCNC: 18 MMOL/L — LOW (ref 22–31)
COLOR SPEC: COLORLESS — SIGNIFICANT CHANGE UP
CREAT SERPL-MCNC: 0.81 MG/DL — SIGNIFICANT CHANGE UP (ref 0.5–1.3)
CREAT SERPL-MCNC: 0.81 MG/DL — SIGNIFICANT CHANGE UP (ref 0.5–1.3)
CREAT SERPL-MCNC: 1 MG/DL — SIGNIFICANT CHANGE UP (ref 0.5–1.3)
CRP SERPL-MCNC: 7 MG/L — HIGH (ref 0–4)
DIFF PNL FLD: ABNORMAL
DSDNA AB SER-ACNC: 83 IU/ML — HIGH
EOSINOPHIL # BLD AUTO: 0.13 K/UL — SIGNIFICANT CHANGE UP (ref 0–0.5)
EOSINOPHIL NFR BLD AUTO: 1.8 % — SIGNIFICANT CHANGE UP (ref 0–6)
EPI CELLS # UR: 4 /HPF — SIGNIFICANT CHANGE UP
ERYTHROCYTE [SEDIMENTATION RATE] IN BLOOD: 76 MM/HR — HIGH (ref 0–15)
GAS PNL BLDV: 136 MMOL/L — SIGNIFICANT CHANGE UP (ref 136–145)
GAS PNL BLDV: 137 MMOL/L — SIGNIFICANT CHANGE UP (ref 136–145)
GAS PNL BLDV: 139 MMOL/L — SIGNIFICANT CHANGE UP (ref 136–145)
GAS PNL BLDV: SIGNIFICANT CHANGE UP
GLUCOSE BLDV-MCNC: 93 MG/DL — SIGNIFICANT CHANGE UP (ref 70–99)
GLUCOSE BLDV-MCNC: 96 MG/DL — SIGNIFICANT CHANGE UP (ref 70–99)
GLUCOSE BLDV-MCNC: 99 MG/DL — SIGNIFICANT CHANGE UP (ref 70–99)
GLUCOSE SERPL-MCNC: 101 MG/DL — HIGH (ref 70–99)
GLUCOSE SERPL-MCNC: 95 MG/DL — SIGNIFICANT CHANGE UP (ref 70–99)
GLUCOSE SERPL-MCNC: 97 MG/DL — SIGNIFICANT CHANGE UP (ref 70–99)
GLUCOSE UR QL: ABNORMAL
HCO3 BLDV-SCNC: 20 MMOL/L — LOW (ref 22–29)
HCO3 BLDV-SCNC: 22 MMOL/L — SIGNIFICANT CHANGE UP (ref 22–29)
HCO3 BLDV-SCNC: 23 MMOL/L — SIGNIFICANT CHANGE UP (ref 22–29)
HCT VFR BLD CALC: 31.9 % — LOW (ref 34.5–45)
HCT VFR BLD CALC: 36.7 % — SIGNIFICANT CHANGE UP (ref 34.5–45)
HCT VFR BLDA CALC: 30 % — LOW (ref 34.5–46.5)
HCT VFR BLDA CALC: 33 % — LOW (ref 34.5–46.5)
HCT VFR BLDA CALC: 36 % — SIGNIFICANT CHANGE UP (ref 34.5–46.5)
HGB BLD CALC-MCNC: 10 G/DL — LOW (ref 11.7–16.1)
HGB BLD CALC-MCNC: 10.9 G/DL — LOW (ref 11.7–16.1)
HGB BLD CALC-MCNC: 11.9 G/DL — SIGNIFICANT CHANGE UP (ref 11.7–16.1)
HGB BLD-MCNC: 11.2 G/DL — LOW (ref 11.5–15.5)
HGB BLD-MCNC: 9.7 G/DL — LOW (ref 11.5–15.5)
HOROWITZ INDEX BLDV+IHG-RTO: SIGNIFICANT CHANGE UP
HYALINE CASTS # UR AUTO: 10 /LPF — HIGH (ref 0–2)
IMM GRANULOCYTES NFR BLD AUTO: 0.3 % — SIGNIFICANT CHANGE UP (ref 0–1.5)
KETONES UR-MCNC: NEGATIVE — SIGNIFICANT CHANGE UP
LACTATE BLDV-MCNC: 3.2 MMOL/L — HIGH (ref 0.7–2)
LACTATE BLDV-MCNC: 3.5 MMOL/L — HIGH (ref 0.7–2)
LACTATE BLDV-MCNC: 5 MMOL/L — CRITICAL HIGH (ref 0.7–2)
LEUKOCYTE ESTERASE UR-ACNC: NEGATIVE — SIGNIFICANT CHANGE UP
LIDOCAIN IGE QN: 47 U/L — SIGNIFICANT CHANGE UP (ref 7–60)
LYMPHOCYTES # BLD AUTO: 3.11 K/UL — SIGNIFICANT CHANGE UP (ref 1–3.3)
LYMPHOCYTES # BLD AUTO: 44 % — SIGNIFICANT CHANGE UP (ref 13–44)
MAGNESIUM SERPL-MCNC: 1.8 MG/DL — SIGNIFICANT CHANGE UP (ref 1.6–2.6)
MAGNESIUM SERPL-MCNC: 1.9 MG/DL — SIGNIFICANT CHANGE UP (ref 1.6–2.6)
MCHC RBC-ENTMCNC: 28.6 PG — SIGNIFICANT CHANGE UP (ref 27–34)
MCHC RBC-ENTMCNC: 28.7 PG — SIGNIFICANT CHANGE UP (ref 27–34)
MCHC RBC-ENTMCNC: 30.4 GM/DL — LOW (ref 32–36)
MCHC RBC-ENTMCNC: 30.5 GM/DL — LOW (ref 32–36)
MCV RBC AUTO: 93.6 FL — SIGNIFICANT CHANGE UP (ref 80–100)
MCV RBC AUTO: 94.4 FL — SIGNIFICANT CHANGE UP (ref 80–100)
MONOCYTES # BLD AUTO: 0.32 K/UL — SIGNIFICANT CHANGE UP (ref 0–0.9)
MONOCYTES NFR BLD AUTO: 4.5 % — SIGNIFICANT CHANGE UP (ref 2–14)
NEUTROPHILS # BLD AUTO: 3.47 K/UL — SIGNIFICANT CHANGE UP (ref 1.8–7.4)
NEUTROPHILS NFR BLD AUTO: 49.1 % — SIGNIFICANT CHANGE UP (ref 43–77)
NITRITE UR-MCNC: NEGATIVE — SIGNIFICANT CHANGE UP
NRBC # BLD: 0 /100 WBCS — SIGNIFICANT CHANGE UP (ref 0–0)
NRBC # BLD: 0 /100 WBCS — SIGNIFICANT CHANGE UP (ref 0–0)
NT-PROBNP SERPL-SCNC: 24 PG/ML — SIGNIFICANT CHANGE UP (ref 0–300)
OTHER CELLS CSF MANUAL: 8.8 ML/DL — LOW (ref 18–22)
PCO2 BLDV: 46 MMHG — HIGH (ref 39–42)
PCO2 BLDV: 48 MMHG — HIGH (ref 39–42)
PCO2 BLDV: 51 MMHG — HIGH (ref 39–42)
PH BLDV: 7.23 — LOW (ref 7.32–7.43)
PH BLDV: 7.24 — LOW (ref 7.32–7.43)
PH BLDV: 7.31 — LOW (ref 7.32–7.43)
PH UR: 7.5 — SIGNIFICANT CHANGE UP (ref 5–8)
PLATELET # BLD AUTO: 158 K/UL — SIGNIFICANT CHANGE UP (ref 150–400)
PLATELET # BLD AUTO: 232 K/UL — SIGNIFICANT CHANGE UP (ref 150–400)
PO2 BLDV: 29 MMHG — SIGNIFICANT CHANGE UP (ref 25–45)
PO2 BLDV: 32 MMHG — SIGNIFICANT CHANGE UP (ref 25–45)
PO2 BLDV: 46 MMHG — HIGH (ref 25–45)
POTASSIUM BLDV-SCNC: 1.6 MMOL/L — CRITICAL LOW (ref 3.5–5.1)
POTASSIUM BLDV-SCNC: 3.3 MMOL/L — LOW (ref 3.5–5.1)
POTASSIUM BLDV-SCNC: 4 MMOL/L — SIGNIFICANT CHANGE UP (ref 3.5–5.1)
POTASSIUM SERPL-MCNC: 3.3 MMOL/L — LOW (ref 3.5–5.3)
POTASSIUM SERPL-MCNC: 4.2 MMOL/L — SIGNIFICANT CHANGE UP (ref 3.5–5.3)
POTASSIUM SERPL-MCNC: <2 MMOL/L — CRITICAL LOW (ref 3.5–5.3)
POTASSIUM SERPL-SCNC: 3.3 MMOL/L — LOW (ref 3.5–5.3)
POTASSIUM SERPL-SCNC: 4.2 MMOL/L — SIGNIFICANT CHANGE UP (ref 3.5–5.3)
POTASSIUM SERPL-SCNC: <2 MMOL/L — CRITICAL LOW (ref 3.5–5.3)
PROCALCITONIN SERPL-MCNC: 0.08 NG/ML — SIGNIFICANT CHANGE UP (ref 0.02–0.1)
PROT SERPL-MCNC: 5.9 G/DL — LOW (ref 6–8.3)
PROT SERPL-MCNC: 8 G/DL — SIGNIFICANT CHANGE UP (ref 6–8.3)
PROT UR-MCNC: ABNORMAL
RBC # BLD: 3.38 M/UL — LOW (ref 3.8–5.2)
RBC # BLD: 3.92 M/UL — SIGNIFICANT CHANGE UP (ref 3.8–5.2)
RBC # FLD: 13.1 % — SIGNIFICANT CHANGE UP (ref 10.3–14.5)
RBC # FLD: 13.2 % — SIGNIFICANT CHANGE UP (ref 10.3–14.5)
RBC CASTS # UR COMP ASSIST: 11 /HPF — HIGH (ref 0–4)
SAO2 % BLDV: 40.7 % — LOW (ref 67–88)
SAO2 % BLDV: 49.9 % — LOW (ref 67–88)
SAO2 % BLDV: 71.9 % — SIGNIFICANT CHANGE UP (ref 67–88)
SARS-COV-2 RNA SPEC QL NAA+PROBE: SIGNIFICANT CHANGE UP
SODIUM SERPL-SCNC: 137 MMOL/L — SIGNIFICANT CHANGE UP (ref 135–145)
SODIUM SERPL-SCNC: 138 MMOL/L — SIGNIFICANT CHANGE UP (ref 135–145)
SODIUM SERPL-SCNC: 140 MMOL/L — SIGNIFICANT CHANGE UP (ref 135–145)
SP GR SPEC: 1.05 — SIGNIFICANT CHANGE UP (ref 1.01–1.02)
TROPONIN T, HIGH SENSITIVITY RESULT: <6 NG/L — SIGNIFICANT CHANGE UP (ref 0–51)
UROBILINOGEN FLD QL: NEGATIVE — SIGNIFICANT CHANGE UP
WBC # BLD: 7.07 K/UL — SIGNIFICANT CHANGE UP (ref 3.8–10.5)
WBC # BLD: 9.23 K/UL — SIGNIFICANT CHANGE UP (ref 3.8–10.5)
WBC # FLD AUTO: 7.07 K/UL — SIGNIFICANT CHANGE UP (ref 3.8–10.5)
WBC # FLD AUTO: 9.23 K/UL — SIGNIFICANT CHANGE UP (ref 3.8–10.5)
WBC UR QL: 366 /HPF — HIGH (ref 0–5)

## 2021-11-22 PROCEDURE — 93010 ELECTROCARDIOGRAM REPORT: CPT | Mod: 59

## 2021-11-22 PROCEDURE — 99291 CRITICAL CARE FIRST HOUR: CPT

## 2021-11-22 PROCEDURE — 76705 ECHO EXAM OF ABDOMEN: CPT | Mod: 26,RT

## 2021-11-22 PROCEDURE — 99283 EMERGENCY DEPT VISIT LOW MDM: CPT

## 2021-11-22 PROCEDURE — 93975 VASCULAR STUDY: CPT | Mod: 26

## 2021-11-22 PROCEDURE — 70450 CT HEAD/BRAIN W/O DYE: CPT | Mod: 26,MA

## 2021-11-22 PROCEDURE — 76830 TRANSVAGINAL US NON-OB: CPT | Mod: 26

## 2021-11-22 PROCEDURE — 74177 CT ABD & PELVIS W/CONTRAST: CPT | Mod: 26,MA

## 2021-11-22 PROCEDURE — 71045 X-RAY EXAM CHEST 1 VIEW: CPT | Mod: 26

## 2021-11-22 PROCEDURE — 78226 HEPATOBILIARY SYSTEM IMAGING: CPT | Mod: 26

## 2021-11-22 RX ORDER — ONDANSETRON 8 MG/1
4 TABLET, FILM COATED ORAL ONCE
Refills: 0 | Status: DISCONTINUED | OUTPATIENT
Start: 2021-11-22 | End: 2021-11-22

## 2021-11-22 RX ORDER — DEXTROSE MONOHYDRATE, SODIUM CHLORIDE, AND POTASSIUM CHLORIDE 50; .745; 4.5 G/1000ML; G/1000ML; G/1000ML
1000 INJECTION, SOLUTION INTRAVENOUS
Refills: 0 | Status: DISCONTINUED | OUTPATIENT
Start: 2021-11-22 | End: 2021-11-22

## 2021-11-22 RX ORDER — CALCIUM GLUCONATE 100 MG/ML
2 VIAL (ML) INTRAVENOUS ONCE
Refills: 0 | Status: COMPLETED | OUTPATIENT
Start: 2021-11-22 | End: 2021-11-22

## 2021-11-22 RX ORDER — ONDANSETRON 8 MG/1
4 TABLET, FILM COATED ORAL ONCE
Refills: 0 | Status: COMPLETED | OUTPATIENT
Start: 2021-11-22 | End: 2021-11-22

## 2021-11-22 RX ORDER — PANTOPRAZOLE SODIUM 20 MG/1
40 TABLET, DELAYED RELEASE ORAL
Refills: 0 | Status: DISCONTINUED | OUTPATIENT
Start: 2021-11-22 | End: 2021-11-24

## 2021-11-22 RX ORDER — HYDROXYCHLOROQUINE SULFATE 200 MG
200 TABLET ORAL DAILY
Refills: 0 | Status: DISCONTINUED | OUTPATIENT
Start: 2021-11-22 | End: 2021-11-24

## 2021-11-22 RX ORDER — SODIUM CHLORIDE 9 MG/ML
1000 INJECTION, SOLUTION INTRAVENOUS ONCE
Refills: 0 | Status: COMPLETED | OUTPATIENT
Start: 2021-11-22 | End: 2021-11-22

## 2021-11-22 RX ORDER — SODIUM CHLORIDE 9 MG/ML
1000 INJECTION, SOLUTION INTRAVENOUS
Refills: 0 | Status: DISCONTINUED | OUTPATIENT
Start: 2021-11-22 | End: 2021-11-22

## 2021-11-22 RX ORDER — CEFTRIAXONE 500 MG/1
1000 INJECTION, POWDER, FOR SOLUTION INTRAMUSCULAR; INTRAVENOUS ONCE
Refills: 0 | Status: COMPLETED | OUTPATIENT
Start: 2021-11-22 | End: 2021-11-22

## 2021-11-22 RX ORDER — HYDROCORTISONE 20 MG
100 TABLET ORAL ONCE
Refills: 0 | Status: COMPLETED | OUTPATIENT
Start: 2021-11-22 | End: 2021-11-22

## 2021-11-22 RX ORDER — IBUPROFEN 200 MG
0 TABLET ORAL
Qty: 0 | Refills: 0 | DISCHARGE

## 2021-11-22 RX ORDER — ACETAMINOPHEN 500 MG
500 TABLET ORAL ONCE
Refills: 0 | Status: DISCONTINUED | OUTPATIENT
Start: 2021-11-22 | End: 2021-11-22

## 2021-11-22 RX ORDER — MAGNESIUM SULFATE 500 MG/ML
1 VIAL (ML) INJECTION ONCE
Refills: 0 | Status: COMPLETED | OUTPATIENT
Start: 2021-11-22 | End: 2021-11-22

## 2021-11-22 RX ORDER — ACETAMINOPHEN 500 MG
650 TABLET ORAL ONCE
Refills: 0 | Status: COMPLETED | OUTPATIENT
Start: 2021-11-22 | End: 2021-11-22

## 2021-11-22 RX ORDER — POTASSIUM CHLORIDE 20 MEQ
10 PACKET (EA) ORAL
Refills: 0 | Status: COMPLETED | OUTPATIENT
Start: 2021-11-22 | End: 2021-11-22

## 2021-11-22 RX ORDER — SODIUM CHLORIDE 9 MG/ML
2000 INJECTION INTRAMUSCULAR; INTRAVENOUS; SUBCUTANEOUS ONCE
Refills: 0 | Status: COMPLETED | OUTPATIENT
Start: 2021-11-22 | End: 2021-11-22

## 2021-11-22 RX ORDER — SODIUM CHLORIDE 9 MG/ML
1000 INJECTION, SOLUTION INTRAVENOUS
Refills: 0 | Status: DISCONTINUED | OUTPATIENT
Start: 2021-11-22 | End: 2021-11-23

## 2021-11-22 RX ORDER — ONDANSETRON 8 MG/1
4 TABLET, FILM COATED ORAL THREE TIMES A DAY
Refills: 0 | Status: DISCONTINUED | OUTPATIENT
Start: 2021-11-22 | End: 2021-11-23

## 2021-11-22 RX ORDER — POTASSIUM CHLORIDE 20 MEQ
40 PACKET (EA) ORAL ONCE
Refills: 0 | Status: COMPLETED | OUTPATIENT
Start: 2021-11-22 | End: 2021-11-22

## 2021-11-22 RX ORDER — ENOXAPARIN SODIUM 100 MG/ML
40 INJECTION SUBCUTANEOUS AT BEDTIME
Refills: 0 | Status: DISCONTINUED | OUTPATIENT
Start: 2021-11-22 | End: 2021-11-24

## 2021-11-22 RX ADMIN — SODIUM CHLORIDE 2000 MILLILITER(S): 9 INJECTION INTRAMUSCULAR; INTRAVENOUS; SUBCUTANEOUS at 04:30

## 2021-11-22 RX ADMIN — SODIUM CHLORIDE 4000 MILLILITER(S): 9 INJECTION INTRAMUSCULAR; INTRAVENOUS; SUBCUTANEOUS at 03:35

## 2021-11-22 RX ADMIN — Medication 650 MILLIGRAM(S): at 20:25

## 2021-11-22 RX ADMIN — Medication 100 MILLIGRAM(S): at 04:21

## 2021-11-22 RX ADMIN — Medication 100 MILLIEQUIVALENT(S): at 06:30

## 2021-11-22 RX ADMIN — SODIUM CHLORIDE 1000 MILLILITER(S): 9 INJECTION, SOLUTION INTRAVENOUS at 06:54

## 2021-11-22 RX ADMIN — Medication 100 MILLIEQUIVALENT(S): at 05:41

## 2021-11-22 RX ADMIN — Medication 200 GRAM(S): at 08:52

## 2021-11-22 RX ADMIN — SODIUM CHLORIDE 75 MILLILITER(S): 9 INJECTION, SOLUTION INTRAVENOUS at 12:48

## 2021-11-22 RX ADMIN — CEFTRIAXONE 100 MILLIGRAM(S): 500 INJECTION, POWDER, FOR SOLUTION INTRAMUSCULAR; INTRAVENOUS at 08:52

## 2021-11-22 RX ADMIN — DEXTROSE MONOHYDRATE, SODIUM CHLORIDE, AND POTASSIUM CHLORIDE 1000 MILLILITER(S): 50; .745; 4.5 INJECTION, SOLUTION INTRAVENOUS at 04:53

## 2021-11-22 RX ADMIN — SODIUM CHLORIDE 75 MILLILITER(S): 9 INJECTION, SOLUTION INTRAVENOUS at 21:14

## 2021-11-22 RX ADMIN — Medication 650 MILLIGRAM(S): at 19:55

## 2021-11-22 RX ADMIN — Medication 100 MILLIEQUIVALENT(S): at 04:27

## 2021-11-22 RX ADMIN — Medication 100 GRAM(S): at 04:21

## 2021-11-22 RX ADMIN — ONDANSETRON 4 MILLIGRAM(S): 8 TABLET, FILM COATED ORAL at 04:21

## 2021-11-22 NOTE — ED PROVIDER NOTE - PHYSICAL EXAMINATION
GENERAL: young female, lying in bed, appears weak. Hypotensive otherwise Vital signs are within normal limits  EYES: Conjunctiva noninjected or pale, sclera anicteric  HENT: NC/AT, dry mucous membranes  NECK: Supple, trachea midline  LUNG: Nonlabored respirations, no wheezes, rales  CV: RRR, Pulses- Radial/dorsalis pedis: 2+ bilateral and equal. cap refil 4 seconds  ABDOMEN: Nondistended, nontender, no guarding or rebound  MSK: No visible deformities, nontender extremities  SKIN: No rashes, bruises  NEURO: AAOx4 (to person, place, time, event), no tremor, steady gait  PSYCH: Normal mood and affect

## 2021-11-22 NOTE — ED ADULT NURSE NOTE - OBJECTIVE STATEMENT
26 year old female with pmhx of lupus presents to ED c/o abdominal pain, vomiting, lethargy that began around 0000 today. Pt states she took 400mg of ibuprofen around 7-8pm, and began to feel much worse after that. Pt is alert, however drowsy, oriented, and speaking coherently. Pt reports vomiting "a lot" this evening. On exam, pt denies headache, blurred vision, +dizziness/ head numbness. Chest rise is equal and symmetrical, lung sounds clear bilaterally. Denies chest pain or shortness of breath. Abdomen soft, tender with palpation. + nausea, vomiting, denies diarrhea. Denies flank pain, urinary sx. Skin warm, dry, intact. Denies fevers/ chills.

## 2021-11-22 NOTE — ED ADULT TRIAGE NOTE - MODE OF ARRIVAL
Brianna is asking for a call today regarding lab results that were suppose to be sent from [facility]. She is aware that  is out of the office today but asking to speak with someone today.   [employer]

## 2021-11-22 NOTE — ED ADULT NURSE REASSESSMENT NOTE - NS ED NURSE REASSESS COMMENT FT1
Writer continues to notify MD Garcia of pt's low BP, Dr. Garcia states the MAP is okay so we will continue to monitor.

## 2021-11-22 NOTE — CONSULT NOTE ADULT - ASSESSMENT
Ms. Aguero is a 25 y/o female with dx of Lupus in 2018 on Plaquenil (tapered of prednisone in 7/2021) who presents to the ED with increased fatigue, weakness, N/V for 1 day. Found to be hypotensive with sever hypokalemia likely 2/2 to hypovolemia 2/2 to GI losses for vomiting.       #Hypotension  - on presentation BP 65/77, s/p 2L NS, currently SBP 80s with MAP 65-69  - recommend giving patient additional aggressive 1-2L LR boluses for fluid resuscitation for hypovolemia  - Bedside POCUS with normal LV function no notable effusions on exam.       #Hypokalemia  - continue with aggresive IV K+ repletion   - patient now amenable to PO intake, would encourage to replete with PO potassium    - recommend serial EKGs to assess QTc     Not a MICU candidate at this time, if any acute changes please reconsult     D/w MICU attending Dr. Lopez.

## 2021-11-22 NOTE — ED PROVIDER NOTE - ATTENDING CONTRIBUTION TO CARE
Afebrile. Awake and Alert. Lungs CTA. Heart RRR. Abdomen soft NTND. CN II-XII grossly intact. Moves all extremities without lateralization.    Hypotension r/o sepsis r/o dehydration  r/o Lupus Flair Afebrile. Awake and Alert. Lungs CTA. Heart RRR. Abdomen soft NTND. CN II-XII grossly intact. Moves all extremities without lateralization.    Hypotension r/o sepsis r/o dehydration  r/o Lupus Flair  r/o adrenal insufficiency from chronic steroid use

## 2021-11-22 NOTE — CONSULT NOTE ADULT - ATTENDING COMMENTS
25 y/o female with dx of Lupus in 2018 on Plaquenil (tapered of prednisone in 7/2021) who presents to the ED with increased fatigue, weakness, N/V for 1 day.  Not an ICU candidate MAP is at goal and K+ is correcting appropriately.

## 2021-11-22 NOTE — ED ADULT NURSE REASSESSMENT NOTE - NS ED NURSE REASSESS COMMENT FT1
0715: Report received from TERA Dutton in [facility]. Pt remains in the ED. Resting comfortably in bed. A&Ox3. Breathing spontaneously and unlabored. NAD. On cardiac monitor, NSR. Pt safety maintained. Will continue to monitor. Awaiting dispo.     0900: US at bedside.

## 2021-11-22 NOTE — ED PROVIDER NOTE - CLINICAL SUMMARY MEDICAL DECISION MAKING FREE TEXT BOX
26F hx of lupus on steroids, occasional nsaids here for nausea, vomiting, and brought to trauma room 2/2 hypotension. appeas weak. Benign abdomen. Concern for occult shock with unclear etiology. Afebrile. Eval for metabolic derrangement vs adrenal insuff. Check labs, aggressive hydration with 2 large bore IVs, stress dose steroids.

## 2021-11-22 NOTE — ED PROVIDER NOTE - NS ED ROS FT
CONSTITUTIONAL: + decreased appetite. No fevers, chills, fatigue, unexpected weight change  ENT: No ear pain, nasal congestion, runny nose, sore throat  CV: No chest pain  PULM: No cough, shortness of breath  GI: + abdominal pain, nausea, vomiting. No diarrhea, constipation, bloody stools  : No dysuria, polyuria, hematuria  SKIN: No rashes, swelling  MSK: No back pain, flank pain  NEURO: No headache  PSYCH: No SI

## 2021-11-22 NOTE — ED PROVIDER NOTE - PROGRESS NOTE DETAILS
Cesar Garcia D.O., PGY3 (Resident)  Called from lab. Low K+ .high lctated. Repleteioons ordered. Cesar Garcia D.O., PGY3 (Resident)  Called from lab. Low K+, high lactate. Repletions ordered. Patient not tolerating PO. QTc prolonged. Mag ordered. Additional fluids ordered. Cesar Garcia D.O., PGY3 (Resident)  MICU bedside MICU defers admission at this time. Pt vomiting. JUANPABLO. MICU defers admission to tele. Pt vomiting. JUANPABLO. received sign out from Dr Barahona pending repeat labs, US and symptoms control and admission. briefly, 26yr F hx of SLE on chronic steroids p/w malaise, vomiting and hypotension. received 4 l IV fluids. received steroid bolus, K supplement for K <2, and and ativan since QTc was 640. repeat EKG QTc <550. on exam by me well appearing, abd exam reassuring. reports diffuse abd pain and nausea but not actively vomiting. lact down to 3, however doubt severe hypoperfusion or septic etiology. will cont to monitor. MICU already seen pt and declined admission. DJ Enmanuel, PGY3: micu contacted again regarding hypotension and elevated lactate. not candidate will admit to medicine. signed out to Charisse

## 2021-11-22 NOTE — H&P ADULT - HISTORY OF PRESENT ILLNESS
Ms. Aguero is a 27 y/o female with dx of Lupus in 2018 on Plaquenil (tapered of prednisone in 7/2021) who presents to the ED with increased fatigue, weakness, N/V for 1 day. Patient states that she her symptoms started this afternoon, she endorsed approximately 5 bouts of nausea and vomiting. She denies any hematemesis or coffee ground emesis. Patient states that she has been taking Aleve and Advil for her joint pains.  She has some epigastric pain after continuous vomiting She states that she did not try any new foods denies sick contacts or recent travel. She denies any diarrhea, constipation.   Of note patient was diagnosed with Lupus in 2017 and follow with Rheumatologist Dr. Pacheco. She was last seen in July 16,2021. Patient was intermittently compliant with her Plaquenil and was tapered off her steroids after being on steroids for several years.   In the ED, VS initial BP 65/77 was given 2L bolus with improvement to SBP 90s. Labs significant for K <2.0 was given 3 runs of K. EKG with QTc 644. Patient not able to tolerate PO so was unable to take PO potassium.

## 2021-11-22 NOTE — CONSULT NOTE ADULT - SUBJECTIVE AND OBJECTIVE BOX
CHIEF COMPLAINT:    HPI:  Ms. Aguero is a 25 y/o female with dx of Lupus in 2018 on Plaquenil (tapered of prednisone in 7/2021) who presents to the ED with increased fatigue, weakness, N/V for 1 day. Patient states that she her symptoms started this afternoon, she endorsed approximately 5 bouts of nausea and vomiting. She denies any hematemesis or coffee ground emesis. She has some epigastric          PAST MEDICAL & SURGICAL HISTORY:  SLE (systemic lupus erythematosus)        FAMILY HISTORY:  FH: breast cancer  in grandmother        SOCIAL HISTORY:  Smoking: __ packs x ___ years  EtOH Use:  Marital Status:  Occupation:  Recent Travel:  Country of Birth:  Advance Directives:    Allergies    No Known Allergies    Intolerances        HOME MEDICATIONS:    REVIEW OF SYSTEMS:  CONSTITUTIONAL: No fever, weight loss, or fatigue  EYES: No eye pain, visual disturbances, or discharge  RESPIRATORY: No cough, wheezing, chills or hemoptysis; No shortness of breath  CARDIOVASCULAR: No chest pain, palpitations, dizziness, or leg swelling  GASTROINTESTINAL: + abdominal pain  or No epigastric pain. + nausea, + vomiting, No hematemesis; No diarrhea or constipation. No melena or hematochezia.  GENITOURINARY: No dysuria, frequency, hematuria, or incontinence  NEUROLOGICAL: No headaches, memory loss, loss of strength, numbness, or tremors  SKIN: No itching, burning, rashes, or lesions   MUSCULOSKELETAL: No joint pain or swelling; No muscle, back, or extremity pain        OBJECTIVE:  ICU Vital Signs Last 24 Hrs  T(C): 36.4 (22 Nov 2021 04:00), Max: 36.6 (22 Nov 2021 03:27)  T(F): 97.5 (22 Nov 2021 04:00), Max: 97.8 (22 Nov 2021 03:27)  HR: 74 (22 Nov 2021 06:00) (69 - 79)  BP: 83/69 (22 Nov 2021 06:00) (66/35 - 83/69)  BP(mean): 67 (22 Nov 2021 06:00) (63 - 69)  ABP: --  ABP(mean): --  RR: 17 (22 Nov 2021 06:00) (15 - 21)  SpO2: 100% (22 Nov 2021 06:00) (100% - 100%)        CAPILLARY BLOOD GLUCOSE      POCT Blood Glucose.: 88 mg/dL (22 Nov 2021 03:35)      PHYSICAL EXAM:  General: Alert and cooperative. No acute stress. Well developed, well nourished.   Head: Normocephalic, no mass or lesions.  Throat: Oral cavity and pharynx normal. No inflammation, swelling, exudate, or lesions. Teeth and gingiva in good general condition.  Respiratory: Bilateral lungs clear to auscultation, no crackles, wheezes, or rhonchi.   Cardiovascular: S1/S2 auscultated. Regular rhythm. No murmurs, rubs or gallop.  Abdomen: Soft, slightly tender, nondistended, mild guarding, No rebound tenderness. Active bowel sounds in all 4 quadrants. No hepatosplenomegaly.  Extremities: No significant deformity or joint abnormality. Peripheral pulses intact. No varicosities. No peripheral edema, atrophy.   Skin: Dry skin, noted skin tenting. Normal color, texture and turgor with no lesions or eruptions.  Neurological: AOAx3.         HOSPITAL MEDICATIONS:  MEDICATIONS  (STANDING):  potassium chloride    Tablet ER 40 milliEquivalent(s) Oral once  sodium chloride 0.9% with potassium chloride 20 mEq/L 1000 milliLiter(s) (1000 mL/Hr) IV Continuous <Continuous>    MEDICATIONS  (PRN):      LABS:                        11.2   7.07  )-----------( 232      ( 22 Nov 2021 03:53 )             36.7     11-22    140  |  103  |  11  ----------------------------<  95  <2.0<LL>   |  17<L>  |  1.00    Ca    9.0      22 Nov 2021 03:53  Mg     1.9     11-22    TPro  8.0  /  Alb  3.7  /  TBili  0.2  /  DBili  x   /  AST  33  /  ALT  14  /  AlkPhos  81  11-22          Venous Blood Gas:  11-22 @ 03:53  7.31/46/32/23/49.9  VBG Lactate: 5.0      MICROBIOLOGY:     RADIOLOGY:  < from: CT Abdomen and Pelvis w/ IV Cont (11.22.21 @ 06:12) >  PROCEDURE:  CT of the Abdomen and Pelvis was performed.  Sagittal and coronal reformats were performed.    FINDINGS:  LOWER CHEST: Clear    LIVER: Periportal edema. 1.4 cm homogeneously enhancing nodule in the lateral segment left hepatic lobe, previously characterized as an adenoma.  BILE DUCTS: Normal caliber.  GALLBLADDER:Partially distended gallbladder with surrounding pericholecystic edema. This is nonspecific.  SPLEEN: Within normal limits.  PANCREAS: Within normal limits.  ADRENALS: Within normal limits.  KIDNEYS/URETERS: Bilateral symmetric and homogeneous enhancement. No hydronephrosis.    BLADDER: Distended.  REPRODUCTIVE ORGANS: Curvilinear hyperdensity in the region of the right adnexa, indeterminate.    BOWEL: No bowel obstruction. Normal appendix.  PERITONEUM: Small volume ascites is prominent the rightupper quadrant.  VESSELS: Normal caliber  RETROPERITONEUM/LYMPH NODES: No enlarged lymph node measuring greater than 10 mm in short axis  ABDOMINAL WALL: Unremarkable  BONES: No acute osseous abnormality    IMPRESSION:    1. Prominent periportal edema, and small volume right upper quadrant ascites of uncertain etiology. Correlate with LFTs as well as patient's fluid status.  2. Appearance of the right adnexa could be related to involuting hemorrhagic corpus luteum cyst. Pelvic sonography can confirm.    < end of copied text >      EKG:     CHIEF COMPLAINT:    HPI:  Ms. Aguero is a 27 y/o female with dx of Lupus in 2018 on Plaquenil (tapered of prednisone in 7/2021) who presents to the ED with increased fatigue, weakness, N/V for 1 day. Patient states that she her symptoms started this afternoon, she endorsed approximately 5 bouts of nausea and vomiting. She denies any hematemesis or coffee ground emesis. Patient states that she has been taking Aleve for her joint pains.  She has some epigastric pain after continuous vomiting She states that she did not try any new foods denies sick contacts or recent travel. She denies any diarrhea, constipation.   Of note patient was diagnosed with Lupus in 2017 and follow with Rheumatologist Dr. Pacheco. She was last seen in July 16,2021. Patient was intermittently compliant with her Plaquenil and was tapered off her steroids after being on steroids for several years.   In the ED, VS initial BP 65/77 was given 2L bolus with improvement to SBP 90s. Labs significant for K <2.0 was given 3 runs of K. EKG with QTc 644. Patient not able to tolerate PO so was unable to take PO potassium. MICU consulted for hypotension Patient seen at the bedside and noted to be tired appearing but able to communicate. Patient with dry mucous membranes and appears overall dry on exam.        PAST MEDICAL & SURGICAL HISTORY:  SLE (systemic lupus erythematosus)        FAMILY HISTORY:  FH: breast cancer  in grandmother        SOCIAL HISTORY:  Smoking: __ packs x ___ years  EtOH Use:  Marital Status:  Occupation:  Recent Travel:  Country of Birth:  Advance Directives:    Allergies    No Known Allergies    Intolerances        HOME MEDICATIONS:    REVIEW OF SYSTEMS:  CONSTITUTIONAL: No fever, weight loss, or fatigue  EYES: No eye pain, visual disturbances, or discharge  RESPIRATORY: No cough, wheezing, chills or hemoptysis; No shortness of breath  CARDIOVASCULAR: No chest pain, palpitations, dizziness, or leg swelling  GASTROINTESTINAL: + abdominal pain  or No epigastric pain. + nausea, + vomiting, No hematemesis; No diarrhea or constipation. No melena or hematochezia.  GENITOURINARY: No dysuria, frequency, hematuria, or incontinence  NEUROLOGICAL: No headaches, memory loss, loss of strength, numbness, or tremors  SKIN: No itching, burning, rashes, or lesions   MUSCULOSKELETAL: No joint pain or swelling; No muscle, back, or extremity pain        OBJECTIVE:  ICU Vital Signs Last 24 Hrs  T(C): 36.4 (22 Nov 2021 04:00), Max: 36.6 (22 Nov 2021 03:27)  T(F): 97.5 (22 Nov 2021 04:00), Max: 97.8 (22 Nov 2021 03:27)  HR: 74 (22 Nov 2021 06:00) (69 - 79)  BP: 83/69 (22 Nov 2021 06:00) (66/35 - 83/69)  BP(mean): 67 (22 Nov 2021 06:00) (63 - 69)  ABP: --  ABP(mean): --  RR: 17 (22 Nov 2021 06:00) (15 - 21)  SpO2: 100% (22 Nov 2021 06:00) (100% - 100%)        CAPILLARY BLOOD GLUCOSE      POCT Blood Glucose.: 88 mg/dL (22 Nov 2021 03:35)      PHYSICAL EXAM:  General: Alert and cooperative. No acute stress. Well developed, well nourished.   Head: Normocephalic, no mass or lesions.  Throat: Dry mucous membrances.  Teeth and gingiva in good general condition.  Respiratory: Bilateral lungs clear to auscultation, no crackles, wheezes, or rhonchi.   Cardiovascular: S1/S2 auscultated. Regular rhythm. No murmurs, rubs or gallop.  Abdomen: Soft, slightly tender, nondistended, mild guarding, No rebound tenderness. Active bowel sounds in all 4 quadrants. No hepatosplenomegaly.  Extremities: No significant deformity or joint abnormality. Peripheral pulses intact.No peripheral edema, atrophy.   Skin: Dry skin, noted skin tenting. Normal color, texture and turgor with no lesions or eruptions.  Neurological: AOAx3.         HOSPITAL MEDICATIONS:  MEDICATIONS  (STANDING):  potassium chloride    Tablet ER 40 milliEquivalent(s) Oral once  sodium chloride 0.9% with potassium chloride 20 mEq/L 1000 milliLiter(s) (1000 mL/Hr) IV Continuous <Continuous>    MEDICATIONS  (PRN):      LABS:                        11.2   7.07  )-----------( 232      ( 22 Nov 2021 03:53 )             36.7     11-22    140  |  103  |  11  ----------------------------<  95  <2.0<LL>   |  17<L>  |  1.00    Ca    9.0      22 Nov 2021 03:53  Mg     1.9     11-22    TPro  8.0  /  Alb  3.7  /  TBili  0.2  /  DBili  x   /  AST  33  /  ALT  14  /  AlkPhos  81  11-22          Venous Blood Gas:  11-22 @ 03:53  7.31/46/32/23/49.9  VBG Lactate: 5.0      MICROBIOLOGY:     RADIOLOGY:  < from: CT Abdomen and Pelvis w/ IV Cont (11.22.21 @ 06:12) >  PROCEDURE:  CT of the Abdomen and Pelvis was performed.  Sagittal and coronal reformats were performed.    FINDINGS:  LOWER CHEST: Clear    LIVER: Periportal edema. 1.4 cm homogeneously enhancing nodule in the lateral segment left hepatic lobe, previously characterized as an adenoma.  BILE DUCTS: Normal caliber.  GALLBLADDER:Partially distended gallbladder with surrounding pericholecystic edema. This is nonspecific.  SPLEEN: Within normal limits.  PANCREAS: Within normal limits.  ADRENALS: Within normal limits.  KIDNEYS/URETERS: Bilateral symmetric and homogeneous enhancement. No hydronephrosis.    BLADDER: Distended.  REPRODUCTIVE ORGANS: Curvilinear hyperdensity in the region of the right adnexa, indeterminate.    BOWEL: No bowel obstruction. Normal appendix.  PERITONEUM: Small volume ascites is prominent the rightupper quadrant.  VESSELS: Normal caliber  RETROPERITONEUM/LYMPH NODES: No enlarged lymph node measuring greater than 10 mm in short axis  ABDOMINAL WALL: Unremarkable  BONES: No acute osseous abnormality    IMPRESSION:    1. Prominent periportal edema, and small volume right upper quadrant ascites of uncertain etiology. Correlate with LFTs as well as patient's fluid status.  2. Appearance of the right adnexa could be related to involuting hemorrhagic corpus luteum cyst. Pelvic sonography can confirm.    < end of copied text >      EKG:

## 2021-11-23 LAB
ALBUMIN SERPL ELPH-MCNC: 2.8 G/DL — LOW (ref 3.3–5)
ALP SERPL-CCNC: 58 U/L — SIGNIFICANT CHANGE UP (ref 40–120)
ALT FLD-CCNC: 23 U/L — SIGNIFICANT CHANGE UP (ref 10–45)
ANION GAP SERPL CALC-SCNC: 11 MMOL/L — SIGNIFICANT CHANGE UP (ref 5–17)
AST SERPL-CCNC: 44 U/L — HIGH (ref 10–40)
BILIRUB SERPL-MCNC: 0.3 MG/DL — SIGNIFICANT CHANGE UP (ref 0.2–1.2)
BUN SERPL-MCNC: 7 MG/DL — SIGNIFICANT CHANGE UP (ref 7–23)
C3 SERPL-MCNC: 117 MG/DL — SIGNIFICANT CHANGE UP (ref 81–157)
C4 SERPL-MCNC: 14 MG/DL — SIGNIFICANT CHANGE UP (ref 13–39)
CALCIUM SERPL-MCNC: 8.6 MG/DL — SIGNIFICANT CHANGE UP (ref 8.4–10.5)
CHLORIDE SERPL-SCNC: 106 MMOL/L — SIGNIFICANT CHANGE UP (ref 96–108)
CO2 SERPL-SCNC: 21 MMOL/L — LOW (ref 22–31)
COVID-19 NUCLEOCAPSID GAM AB INTERP: NEGATIVE — SIGNIFICANT CHANGE UP
COVID-19 NUCLEOCAPSID TOTAL GAM ANTIBODY RESULT: 0.5 INDEX — SIGNIFICANT CHANGE UP
COVID-19 SPIKE DOMAIN AB INTERP: POSITIVE
COVID-19 SPIKE DOMAIN ANTIBODY RESULT: 17.1 U/ML — HIGH
CREAT SERPL-MCNC: 0.76 MG/DL — SIGNIFICANT CHANGE UP (ref 0.5–1.3)
GLUCOSE SERPL-MCNC: 74 MG/DL — SIGNIFICANT CHANGE UP (ref 70–99)
HCT VFR BLD CALC: 28.8 % — LOW (ref 34.5–45)
HGB BLD-MCNC: 9.2 G/DL — LOW (ref 11.5–15.5)
MCHC RBC-ENTMCNC: 28.8 PG — SIGNIFICANT CHANGE UP (ref 27–34)
MCHC RBC-ENTMCNC: 31.9 GM/DL — LOW (ref 32–36)
MCV RBC AUTO: 90.3 FL — SIGNIFICANT CHANGE UP (ref 80–100)
NRBC # BLD: 0 /100 WBCS — SIGNIFICANT CHANGE UP (ref 0–0)
PLATELET # BLD AUTO: 172 K/UL — SIGNIFICANT CHANGE UP (ref 150–400)
POTASSIUM SERPL-MCNC: 4 MMOL/L — SIGNIFICANT CHANGE UP (ref 3.5–5.3)
POTASSIUM SERPL-SCNC: 4 MMOL/L — SIGNIFICANT CHANGE UP (ref 3.5–5.3)
PROT SERPL-MCNC: 6.2 G/DL — SIGNIFICANT CHANGE UP (ref 6–8.3)
RBC # BLD: 3.19 M/UL — LOW (ref 3.8–5.2)
RBC # FLD: 13.3 % — SIGNIFICANT CHANGE UP (ref 10.3–14.5)
SARS-COV-2 IGG+IGM SERPL QL IA: 0.5 INDEX — SIGNIFICANT CHANGE UP
SARS-COV-2 IGG+IGM SERPL QL IA: 17.1 U/ML — HIGH
SARS-COV-2 IGG+IGM SERPL QL IA: NEGATIVE — SIGNIFICANT CHANGE UP
SARS-COV-2 IGG+IGM SERPL QL IA: POSITIVE
SODIUM SERPL-SCNC: 138 MMOL/L — SIGNIFICANT CHANGE UP (ref 135–145)
WBC # BLD: 2.37 K/UL — LOW (ref 3.8–10.5)
WBC # FLD AUTO: 2.37 K/UL — LOW (ref 3.8–10.5)

## 2021-11-23 PROCEDURE — 93010 ELECTROCARDIOGRAM REPORT: CPT

## 2021-11-23 RX ORDER — CIPROFLOXACIN LACTATE 400MG/40ML
500 VIAL (ML) INTRAVENOUS EVERY 12 HOURS
Refills: 0 | Status: DISCONTINUED | OUTPATIENT
Start: 2021-11-23 | End: 2021-11-23

## 2021-11-23 RX ORDER — CEPHALEXIN 500 MG
500 CAPSULE ORAL
Refills: 0 | Status: DISCONTINUED | OUTPATIENT
Start: 2021-11-23 | End: 2021-11-24

## 2021-11-23 RX ADMIN — Medication 500 MILLIGRAM(S): at 17:05

## 2021-11-23 RX ADMIN — Medication 500 MILLIGRAM(S): at 23:09

## 2021-11-23 RX ADMIN — PANTOPRAZOLE SODIUM 40 MILLIGRAM(S): 20 TABLET, DELAYED RELEASE ORAL at 06:02

## 2021-11-23 NOTE — PROGRESS NOTE ADULT - SUBJECTIVE AND OBJECTIVE BOX
Patient is a 26y old  Female who presents with a chief complaint of     HPI:  No nausea or vomiting overnight. Electrolytes are WNL.    PAST MEDICAL & SURGICAL HISTORY:  SLE (systemic lupus erythematosus)        Review of Systems:   CONSTITUTIONAL: No fever, weight loss, or fatigue  EYES: No eye pain, visual disturbances, or discharge  ENMT:  No difficulty hearing, tinnitus, vertigo; No sinus or throat pain  NECK: No pain or stiffness  BREASTS: No pain, masses, or nipple discharge  RESPIRATORY: No cough, wheezing, chills or hemoptysis; No shortness of breath  CARDIOVASCULAR: No chest pain, palpitations, dizziness, or leg swelling  GASTROINTESTINAL: No abdominal or epigastric pain. No nausea, vomiting, or hematemesis; No diarrhea or constipation. No melena or hematochezia.  GENITOURINARY: No dysuria, frequency, hematuria, or incontinence  NEUROLOGICAL: No headaches, memory loss, loss of strength, numbness, or tremors  SKIN: No itching, burning, rashes, or lesions   LYMPH NODES: No enlarged glands  ENDOCRINE: No heat or cold intolerance; No hair loss  MUSCULOSKELETAL: No joint pain or swelling; No muscle, back, or extremity pain  PSYCHIATRIC: No depression, anxiety, mood swings, or difficulty sleeping  HEME/LYMPH: No easy bruising, or bleeding gums  ALLERY AND IMMUNOLOGIC: No hives or eczema    Allergies    No Known Allergies    Intolerances        Social History:     FAMILY HISTORY:  FH: breast cancer  in grandmother        MEDICATIONS  (STANDING):  cephalexin 500 milliGRAM(s) Oral four times a day  enoxaparin Injectable 40 milliGRAM(s) SubCutaneous at bedtime  hydroxychloroquine 200 milliGRAM(s) Oral daily  pantoprazole    Tablet 40 milliGRAM(s) Oral before breakfast    MEDICATIONS  (PRN):        CAPILLARY BLOOD GLUCOSE        I&O's Summary    2021 07:01  -  2021 07:00  --------------------------------------------------------  IN: 937.5 mL / OUT: 0 mL / NET: 937.5 mL        PHYSICAL EXAM:  Vital Signs Last 24 Hrs  T(C): 36.9 (2021 11:26), Max: 36.9 (2021 05:54)  T(F): 98.4 (2021 11:26), Max: 98.4 (2021 05:54)  HR: 67 (2021 11:26) (67 - 75)  BP: 101/67 (2021 11:26) (92/57 - 102/68)  BP(mean): --  RR: 18 (2021 11:26) (18 - 18)  SpO2: 100% (2021 11:26) (100% - 100%)    GENERAL: NAD, well-developed  HEAD:  Atraumatic, Normocephalic  EYES: EOMI, PERRLA, conjunctiva and sclera clear  NECK: Supple, No JVD  CHEST/LUNG: Clear to auscultation bilaterally; No wheeze  HEART: Regular rate and rhythm; No murmurs, rubs, or gallops  ABDOMEN: Soft, Nontender, Nondistended; Bowel sounds present  EXTREMITIES:  2+ Peripheral Pulses, No clubbing, cyanosis, or edema  PSYCH: AAOx3  NEUROLOGY: non-focal  SKIN: No rashes or lesions    LABS:                        9.2    2.37  )-----------( 172      ( 2021 07:02 )             28.8     11-    138  |  106  |  7   ----------------------------<  74  4.0   |  21<L>  |  0.76    Ca    8.6      2021 07:39  Mg     1.8     11-22    TPro  6.2  /  Alb  2.8<L>  /  TBili  0.3  /  DBili  x   /  AST  44<H>  /  ALT  23  /  AlkPhos  58  11-23          Urinalysis Basic - ( 2021 07:53 )    Color: Colorless / Appearance: Slightly Turbid / S.047 / pH: x  Gluc: x / Ketone: Negative  / Bili: Negative / Urobili: Negative   Blood: x / Protein: 100 mg/dL / Nitrite: Negative   Leuk Esterase: Negative / RBC: 11 /hpf /  /HPF   Sq Epi: x / Non Sq Epi: 4 /hpf / Bacteria: Negative        RADIOLOGY & ADDITIONAL TESTS:    Imaging Personally Reviewed:    Consultant(s) Notes Reviewed:      Care Discussed with Consultants/Other Providers:

## 2021-11-24 ENCOUNTER — TRANSCRIPTION ENCOUNTER (OUTPATIENT)
Age: 26
End: 2021-11-24

## 2021-11-24 VITALS
SYSTOLIC BLOOD PRESSURE: 94 MMHG | HEART RATE: 76 BPM | OXYGEN SATURATION: 99 % | TEMPERATURE: 98 F | DIASTOLIC BLOOD PRESSURE: 61 MMHG | RESPIRATION RATE: 18 BRPM

## 2021-11-24 PROCEDURE — 93975 VASCULAR STUDY: CPT

## 2021-11-24 PROCEDURE — 36415 COLL VENOUS BLD VENIPUNCTURE: CPT

## 2021-11-24 PROCEDURE — 86225 DNA ANTIBODY NATIVE: CPT

## 2021-11-24 PROCEDURE — 70450 CT HEAD/BRAIN W/O DYE: CPT | Mod: MA

## 2021-11-24 PROCEDURE — 82435 ASSAY OF BLOOD CHLORIDE: CPT

## 2021-11-24 PROCEDURE — 87635 SARS-COV-2 COVID-19 AMP PRB: CPT

## 2021-11-24 PROCEDURE — 86140 C-REACTIVE PROTEIN: CPT

## 2021-11-24 PROCEDURE — 76830 TRANSVAGINAL US NON-OB: CPT

## 2021-11-24 PROCEDURE — 82533 TOTAL CORTISOL: CPT

## 2021-11-24 PROCEDURE — 86160 COMPLEMENT ANTIGEN: CPT

## 2021-11-24 PROCEDURE — 96376 TX/PRO/DX INJ SAME DRUG ADON: CPT

## 2021-11-24 PROCEDURE — 83690 ASSAY OF LIPASE: CPT

## 2021-11-24 PROCEDURE — 99285 EMERGENCY DEPT VISIT HI MDM: CPT

## 2021-11-24 PROCEDURE — 84295 ASSAY OF SERUM SODIUM: CPT

## 2021-11-24 PROCEDURE — 96375 TX/PRO/DX INJ NEW DRUG ADDON: CPT

## 2021-11-24 PROCEDURE — 84132 ASSAY OF SERUM POTASSIUM: CPT

## 2021-11-24 PROCEDURE — 87186 SC STD MICRODIL/AGAR DIL: CPT

## 2021-11-24 PROCEDURE — 85018 HEMOGLOBIN: CPT

## 2021-11-24 PROCEDURE — 87086 URINE CULTURE/COLONY COUNT: CPT

## 2021-11-24 PROCEDURE — 76705 ECHO EXAM OF ABDOMEN: CPT

## 2021-11-24 PROCEDURE — 83735 ASSAY OF MAGNESIUM: CPT

## 2021-11-24 PROCEDURE — 83880 ASSAY OF NATRIURETIC PEPTIDE: CPT

## 2021-11-24 PROCEDURE — 82962 GLUCOSE BLOOD TEST: CPT

## 2021-11-24 PROCEDURE — 85025 COMPLETE CBC W/AUTO DIFF WBC: CPT

## 2021-11-24 PROCEDURE — 87077 CULTURE AEROBIC IDENTIFY: CPT

## 2021-11-24 PROCEDURE — 74177 CT ABD & PELVIS W/CONTRAST: CPT | Mod: MA

## 2021-11-24 PROCEDURE — 85014 HEMATOCRIT: CPT

## 2021-11-24 PROCEDURE — 83605 ASSAY OF LACTIC ACID: CPT

## 2021-11-24 PROCEDURE — 84484 ASSAY OF TROPONIN QUANT: CPT

## 2021-11-24 PROCEDURE — 93005 ELECTROCARDIOGRAM TRACING: CPT

## 2021-11-24 PROCEDURE — 80053 COMPREHEN METABOLIC PANEL: CPT

## 2021-11-24 PROCEDURE — 82565 ASSAY OF CREATININE: CPT

## 2021-11-24 PROCEDURE — 87040 BLOOD CULTURE FOR BACTERIA: CPT

## 2021-11-24 PROCEDURE — 71045 X-RAY EXAM CHEST 1 VIEW: CPT

## 2021-11-24 PROCEDURE — 96374 THER/PROPH/DIAG INJ IV PUSH: CPT

## 2021-11-24 PROCEDURE — 78226 HEPATOBILIARY SYSTEM IMAGING: CPT

## 2021-11-24 PROCEDURE — 82803 BLOOD GASES ANY COMBINATION: CPT

## 2021-11-24 PROCEDURE — 84702 CHORIONIC GONADOTROPIN TEST: CPT

## 2021-11-24 PROCEDURE — 82947 ASSAY GLUCOSE BLOOD QUANT: CPT

## 2021-11-24 PROCEDURE — 80048 BASIC METABOLIC PNL TOTAL CA: CPT

## 2021-11-24 PROCEDURE — 82330 ASSAY OF CALCIUM: CPT

## 2021-11-24 PROCEDURE — 86769 SARS-COV-2 COVID-19 ANTIBODY: CPT

## 2021-11-24 PROCEDURE — 85652 RBC SED RATE AUTOMATED: CPT

## 2021-11-24 PROCEDURE — 81001 URINALYSIS AUTO W/SCOPE: CPT

## 2021-11-24 PROCEDURE — 84145 PROCALCITONIN (PCT): CPT

## 2021-11-24 PROCEDURE — 85027 COMPLETE CBC AUTOMATED: CPT

## 2021-11-24 PROCEDURE — A9537: CPT

## 2021-11-24 RX ORDER — CEPHALEXIN 500 MG
1 CAPSULE ORAL
Qty: 8 | Refills: 0
Start: 2021-11-24 | End: 2021-11-25

## 2021-11-24 RX ADMIN — Medication 200 MILLIGRAM(S): at 12:15

## 2021-11-24 RX ADMIN — Medication 500 MILLIGRAM(S): at 05:47

## 2021-11-24 RX ADMIN — Medication 500 MILLIGRAM(S): at 12:16

## 2021-11-24 RX ADMIN — PANTOPRAZOLE SODIUM 40 MILLIGRAM(S): 20 TABLET, DELAYED RELEASE ORAL at 06:04

## 2021-11-24 NOTE — DISCHARGE NOTE PROVIDER - NSDCMRMEDTOKEN_GEN_ALL_CORE_FT
hydroxychloroquine 200 mg oral tablet: 1 tab(s) orally once a day   cephalexin 500 mg oral capsule: 1 cap(s) orally 4 times a day  hydroxychloroquine 200 mg oral tablet: 1 tab(s) orally once a day

## 2021-11-24 NOTE — DISCHARGE NOTE NURSING/CASE MANAGEMENT/SOCIAL WORK - PATIENT PORTAL LINK FT
You can access the FollowMyHealth Patient Portal offered by [facility] by registering at the following website: [email or web address]. By joining [employer]’s FollowMyHealth portal, you will also be able to view your health information using other applications (apps) compatible with our system.

## 2021-11-24 NOTE — DISCHARGE NOTE PROVIDER - CARE PROVIDER_API CALL
Dalila Coon [facility]  Internal Medicine  [address] [place] [address]  Phone: [phone]  Fax: [phone]  Follow Up Time: 1 week

## 2021-11-24 NOTE — DISCHARGE NOTE PROVIDER - HOSPITAL COURSE
Ms. Aguero is a 25 y/o female with dx of Lupus in 2018 on Plaquenil (tapered of prednisone in 7/2021) who presents to the ED with increased fatigue, weakness, N/V for 1 day. Patient states that she her symptoms started this afternoon, she endorsed approximately 5 bouts of nausea and vomiting. She denies any hematemesis or coffee ground emesis. Patient states that she has been taking Aleve for her joint pains.  She has some epigastric pain after continuous vomiting She states that she did not try any new foods denies sick contacts or recent travel. She denies any diarrhea, constipation.   Of note patient was diagnosed with Lupus in 2017 and follow with Rheumatologist Dr. Pacheco. She was last seen in July 16,2021. Patient was intermittently compliant with her Plaquenil and was tapered off her steroids after being on steroids for several years.   In the ED, VS initial BP 65/77 was given 2L bolus with improvement to SBP 90s. Labs significant for K <2.0 was given 3 runs of K. EKG with QTc 644. Patient not able to tolerate PO so was unable to take PO potassium. MICU consulted for hypotension. . recommended giving patient additional aggressive 1-2L LR boluses for fluid resuscitation for hypovolemia.  Bedside POCUS with normal LV function no notable effusions on exam.  hypotension improved with vigorous hydration. RUQ sono with GB wall thickening. HIDA scan neg for acute cholecystitis; Diet gradually advanced; No further vomiting; +Urine clx with ecoli. started keflex. On admission qtc prolonged. after electrolyte replacement normalized. EKG with ant septal t inversions. no change from previous EKG. ECho ordered, but pt insists on getting it outpt. Discharged home with PCP follow up.      Ms. Aguero is a 25 y/o female with dx of Lupus in 2018 on Plaquenil (tapered of prednisone in 7/2021) who presents to the ED with increased fatigue, weakness, N/V for 1 day. Patient states that she her symptoms started this afternoon, she endorsed approximately 5 bouts of nausea and vomiting. She denies any hematemesis or coffee ground emesis. Patient states that she has been taking Aleve for her joint pains.  She has some epigastric pain after continuous vomiting She states that she did not try any new foods denies sick contacts or recent travel. She denies any diarrhea, constipation.   Of note patient was diagnosed with Lupus in 2017 and follow with Rheumatologist Dr. Pacheco. She was last seen in July 16,2021. Patient was intermittently compliant with her Plaquenil and was tapered off her steroids after being on steroids for several years.   In the ED, VS initial BP 65/77 was given 2L bolus with improvement to SBP 90s. Labs significant for K <2.0 was given 3 runs of K. EKG with QTc 644. Patient not able to tolerate PO so was unable to take PO potassium. MICU consulted for hypotension. . recommended giving patient additional aggressive 1-2L LR boluses for fluid resuscitation for hypovolemia.  Bedside POCUS with normal LV function no notable effusions on exam.  hypotension improved with vigorous hydration. RUQ sono with GB wall thickening. HIDA scan neg for acute cholecystitis; Diet gradually advanced; No further vomiting; +Urine clx with ecoli. started keflex x 3 days. On admission qtc prolonged. after electrolyte replacement normalized. EKG with ant septal t inversions. no change from previous EKG. ECho ordered, but pt insists on getting it outpt. Discharged home with PCP follow up.

## 2021-12-03 LAB
CORTICOSTEROID BINDING GLOBULIN RESULT: 2 MG/DL — SIGNIFICANT CHANGE UP
CORTIS F/TOTAL MFR SERPL: 35 % — SIGNIFICANT CHANGE UP
CORTIS SERPL-MCNC: 20 UG/DL — HIGH
CORTISOL, FREE RESULT: 6.9 UG/DL — HIGH

## 2022-01-14 LAB
25(OH)D3 SERPL-MCNC: 40.4 NG/ML
25(OH)D3 SERPL-MCNC: 56.3 NG/ML
ALBUMIN SERPL ELPH-MCNC: 3.9 G/DL
ALBUMIN SERPL ELPH-MCNC: 3.9 G/DL
ALBUMIN SERPL ELPH-MCNC: 4 G/DL
ALP BLD-CCNC: 83 U/L
ALP BLD-CCNC: 84 U/L
ALP BLD-CCNC: 86 U/L
ALT SERPL-CCNC: 10 U/L
ALT SERPL-CCNC: 11 U/L
ALT SERPL-CCNC: 11 U/L
ANION GAP SERPL CALC-SCNC: 10 MMOL/L
ANION GAP SERPL CALC-SCNC: 12 MMOL/L
ANION GAP SERPL CALC-SCNC: 16 MMOL/L
APPEARANCE: ABNORMAL
APPEARANCE: CLEAR
APPEARANCE: CLEAR
AST SERPL-CCNC: 18 U/L
AST SERPL-CCNC: 20 U/L
AST SERPL-CCNC: 28 U/L
BASOPHILS # BLD AUTO: 0.01 K/UL
BASOPHILS NFR BLD AUTO: 0.2 %
BASOPHILS NFR BLD AUTO: 0.3 %
BASOPHILS NFR BLD AUTO: 0.3 %
BILIRUB SERPL-MCNC: 0.3 MG/DL
BILIRUB SERPL-MCNC: 0.5 MG/DL
BILIRUB SERPL-MCNC: 0.6 MG/DL
BILIRUBIN URINE: NEGATIVE
BLOOD URINE: ABNORMAL
BLOOD URINE: NEGATIVE
BLOOD URINE: NORMAL
BUN SERPL-MCNC: 10 MG/DL
BUN SERPL-MCNC: 8 MG/DL
BUN SERPL-MCNC: 8 MG/DL
C3 SERPL-MCNC: 104 MG/DL
C3 SERPL-MCNC: 117 MG/DL
C3 SERPL-MCNC: 91 MG/DL
C4 SERPL-MCNC: 13 MG/DL
C4 SERPL-MCNC: 16 MG/DL
C4 SERPL-MCNC: 17 MG/DL
CALCIUM SERPL-MCNC: 8.8 MG/DL
CALCIUM SERPL-MCNC: 8.9 MG/DL
CALCIUM SERPL-MCNC: 9.4 MG/DL
CHLORIDE SERPL-SCNC: 100 MMOL/L
CHLORIDE SERPL-SCNC: 102 MMOL/L
CHLORIDE SERPL-SCNC: 102 MMOL/L
CO2 SERPL-SCNC: 21 MMOL/L
CO2 SERPL-SCNC: 24 MMOL/L
CO2 SERPL-SCNC: 28 MMOL/L
COLOR: YELLOW
CREAT SERPL-MCNC: 0.58 MG/DL
CREAT SERPL-MCNC: 0.59 MG/DL
CREAT SERPL-MCNC: 0.6 MG/DL
CREAT SPEC-SCNC: 175 MG/DL
CREAT/PROT UR: 0.1 RATIO
CRP SERPL-MCNC: 10 MG/L
CRP SERPL-MCNC: 25 MG/L
CRP SERPL-MCNC: 8 MG/L
DSDNA AB SER-ACNC: 186 IU/ML
DSDNA AB SER-ACNC: 268 IU/ML
DSDNA AB SER-ACNC: 90 IU/ML
EOSINOPHIL # BLD AUTO: 0.03 K/UL
EOSINOPHIL # BLD AUTO: 0.09 K/UL
EOSINOPHIL # BLD AUTO: 0.13 K/UL
EOSINOPHIL NFR BLD AUTO: 0.7 %
EOSINOPHIL NFR BLD AUTO: 2.5 %
EOSINOPHIL NFR BLD AUTO: 3.3 %
ERYTHROCYTE [SEDIMENTATION RATE] IN BLOOD BY WESTERGREN METHOD: 101 MM/HR
ERYTHROCYTE [SEDIMENTATION RATE] IN BLOOD BY WESTERGREN METHOD: 59 MM/HR
ERYTHROCYTE [SEDIMENTATION RATE] IN BLOOD BY WESTERGREN METHOD: 72 MM/HR
GLUCOSE QUALITATIVE U: NEGATIVE
GLUCOSE SERPL-MCNC: 52 MG/DL
GLUCOSE SERPL-MCNC: 76 MG/DL
GLUCOSE SERPL-MCNC: 79 MG/DL
HCT VFR BLD CALC: 37.5 %
HCT VFR BLD CALC: 39.3 %
HCT VFR BLD CALC: 39.4 %
HGB BLD-MCNC: 11.8 G/DL
HGB BLD-MCNC: 12.3 G/DL
HGB BLD-MCNC: 12.5 G/DL
IMM GRANULOCYTES NFR BLD AUTO: 0.2 %
IMM GRANULOCYTES NFR BLD AUTO: 0.3 %
IMM GRANULOCYTES NFR BLD AUTO: 0.3 %
KETONES URINE: ABNORMAL
KETONES URINE: NEGATIVE
KETONES URINE: NEGATIVE
LEUKOCYTE ESTERASE URINE: ABNORMAL
LEUKOCYTE ESTERASE URINE: NEGATIVE
LEUKOCYTE ESTERASE URINE: NEGATIVE
LYMPHOCYTES # BLD AUTO: 0.87 K/UL
LYMPHOCYTES # BLD AUTO: 1.07 K/UL
LYMPHOCYTES # BLD AUTO: 1.17 K/UL
LYMPHOCYTES NFR BLD AUTO: 21.6 %
LYMPHOCYTES NFR BLD AUTO: 29.2 %
LYMPHOCYTES NFR BLD AUTO: 30 %
MAN DIFF?: NORMAL
MCHC RBC-ENTMCNC: 28.9 PG
MCHC RBC-ENTMCNC: 29 PG
MCHC RBC-ENTMCNC: 29.2 PG
MCHC RBC-ENTMCNC: 31.2 GM/DL
MCHC RBC-ENTMCNC: 31.5 GM/DL
MCHC RBC-ENTMCNC: 31.8 GM/DL
MCV RBC AUTO: 90.8 FL
MCV RBC AUTO: 92.1 FL
MCV RBC AUTO: 93.6 FL
MONOCYTES # BLD AUTO: 0.16 K/UL
MONOCYTES # BLD AUTO: 0.2 K/UL
MONOCYTES # BLD AUTO: 0.22 K/UL
MONOCYTES NFR BLD AUTO: 4 %
MONOCYTES NFR BLD AUTO: 5.1 %
MONOCYTES NFR BLD AUTO: 6 %
NEUTROPHILS # BLD AUTO: 2.26 K/UL
NEUTROPHILS # BLD AUTO: 2.38 K/UL
NEUTROPHILS # BLD AUTO: 2.94 K/UL
NEUTROPHILS NFR BLD AUTO: 61 %
NEUTROPHILS NFR BLD AUTO: 61.7 %
NEUTROPHILS NFR BLD AUTO: 73.3 %
NITRITE URINE: NEGATIVE
NITRITE URINE: NEGATIVE
NITRITE URINE: POSITIVE
PH URINE: 6
PH URINE: 6
PH URINE: 6.5
PLATELET # BLD AUTO: 199 K/UL
PLATELET # BLD AUTO: 200 K/UL
PLATELET # BLD AUTO: 210 K/UL
POTASSIUM SERPL-SCNC: 4.1 MMOL/L
POTASSIUM SERPL-SCNC: 4.2 MMOL/L
POTASSIUM SERPL-SCNC: 4.3 MMOL/L
PROT SERPL-MCNC: 7.6 G/DL
PROT SERPL-MCNC: 7.7 G/DL
PROT SERPL-MCNC: 8.5 G/DL
PROT UR-MCNC: 11 MG/DL
PROTEIN URINE: ABNORMAL
PROTEIN URINE: NEGATIVE
PROTEIN URINE: NORMAL
RBC # BLD: 4.07 M/UL
RBC # BLD: 4.21 M/UL
RBC # BLD: 4.33 M/UL
RBC # FLD: 12.8 %
RBC # FLD: 13.1 %
RBC # FLD: 13.2 %
SODIUM SERPL-SCNC: 137 MMOL/L
SODIUM SERPL-SCNC: 138 MMOL/L
SODIUM SERPL-SCNC: 140 MMOL/L
SPECIFIC GRAVITY URINE: 1.02
SPECIFIC GRAVITY URINE: 1.02
SPECIFIC GRAVITY URINE: 1.03
TSH SERPL-ACNC: 2.04 UIU/ML
UROBILINOGEN URINE: NORMAL
WBC # FLD AUTO: 3.66 K/UL
WBC # FLD AUTO: 3.9 K/UL
WBC # FLD AUTO: 4.02 K/UL

## 2022-01-21 ENCOUNTER — APPOINTMENT (OUTPATIENT)
Dept: RHEUMATOLOGY | Facility: CLINIC | Age: 27
End: 2022-01-21

## 2022-02-02 NOTE — ED PROVIDER NOTE - ATTESTATION, MLM
58
I have reviewed and confirmed nurses' notes for patient's medications, allergies, medical history, and surgical history.

## 2022-02-18 ENCOUNTER — APPOINTMENT (OUTPATIENT)
Dept: RHEUMATOLOGY | Facility: CLINIC | Age: 27
End: 2022-02-18

## 2022-02-18 VITALS
HEART RATE: 92 BPM | DIASTOLIC BLOOD PRESSURE: 75 MMHG | BODY MASS INDEX: 19.77 KG/M2 | TEMPERATURE: 97.5 F | WEIGHT: 123 LBS | OXYGEN SATURATION: 100 % | SYSTOLIC BLOOD PRESSURE: 108 MMHG | HEIGHT: 66 IN

## 2022-02-18 NOTE — HISTORY OF PRESENT ILLNESS
[FreeTextEntry1] : Zohreh Aguero   [birth year]         Initial rheumatology evaluation 18.   H/O lupus diagnosed in Summer 2017   + DNA >400, + Sm >8  + RNP, low complements, low WBC.  At the time she had generalized fatigue, joint pain and swelling.  She was given prednisone 10mg and plaquenil, however patient never took plaquenil and was very inconsistent with prednisone, she would take 2-3 times a month.  She has also had episodes of migraine since summer of 2017 but doesn’t take any medications.   Admitted to hospital in 2018 with symptoms of fever 102-103 intermittent for a week, fatigue, arthalgias.  Initially treated for presumed UTI but all cultures were negative.   Pancytopenia noted WBC 2.9, Plt 85, Hgb 9.4  Hct 31, KENAN negative, ,  C3  45, C4 6.  She was treated with prednisone 60mg daily and discharged on a taper of 10mg every week.   Today she is taking prednisone 50mg daily for a week and plaquenil 400mg.   She does not have any pain today but feels tired and generalized fatigue with a feeling that someone is sitting on her chest.     She denies any rashes or photosensitivity, no oral ulcers, no Raynauds.  She lost about 10 lbs during the weeks preceding her hospital admission, had low appetite at the time.  Now she has a good appetite and gaining weight.  Peripheral swelling and pain resolved.  She has had diffuse hair thinning since last summer.      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  She would like to resume work and school this September.      LMP  (usually regular)      No toxic habits      Not sexually active at the time.     1 pregnancy, 1       No family history of lupus or other CTD      Height 5’5”  weight 112.4   /64  P 97  Pox 99  T 97.2   No rashes   No oral or nasal ulcers   No cervical adenopathy   Lungs CTAB   CV RR S1/S2   Abdomen: soft, non tender, no organomegaly   No peripheral swelling, FROM hands, wrists, elbows, shoulders, knees and ankles.  No pain on exam.   Hips FROM   Normal muscle strength upper and lower extremities   Neuro: non focal         A/P: lupus s/p recent flare arthritis/ thrombocytopenia/ fever now clinically improved.  Discussed at length treatment and expectations.   She will continue on plaquenil 400mg and continue taper of prednisone 10mg every week.  RTC in 4 weeks at 10mg prednisone daily.   She will go to a support group meeting this afternoon.   Check labs today.   Introduced therapeutic trials and specimen bank.  To discuss more next visit.   Discussed vaccinations.      18  Zohreh feels well.  She has been compliant with plaquenil 400mg and has decreased prednisone to 10mg for the past 2 weeks.  She has occasional arthalgias/myalgias, but usually resolves within same day and she doesn’t take any NSAIDs, no Tylenol.   She denies any rashes, no oral or nasal ulcers, no weight loss, no Raynauds, no sicca symptoms.   She has been experiencing headaches but much milder than previously.  1-2 per week and also usually improves after laying down, resting.   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      Weight 117  /73  P 100  Pox 98  T 99   No rashes   No oral or nasal ulcers   No cervical adenopathy   Lungs CTAB   Cv RR S1/S2   No peripheral swelling, FROM hands, wrists, elbows, shoulders, knees and ankles.  No pain on mobilization or palpation.      Labs reviewed      A/P: stable lupus.  Continue plaquenil 400mg daily.  Taper prednisone to 5mg daily then after 10 days further taper to 2.5mg.  RTC In 9 weeks.  Check labs today      18  She feels well and has no complaints.  She hasn’t had significant joint pain.  Occasional pain and swelling of digits but improves on same or next day.  She hasn’t taken any NSAIDs or Tylenol.  Compliant with prednisone 2.5mg daily and not so compliant with plaquenil.  She takes it 2-3 times a week.  She feels she has to have some food when taking meds and not eating as consistently.  No weight loss.  No rashes, no oral or nasal ulcers.   She travelled to [place] recently for 1 week as a graduation present.  She had a great time, snorkeling, kayaking.  She wore sunscreen and protective clothing.   Weight 118  BP 97/66  P 98  T 98.1   No rashes   No oral or nasal ulcers   No cervical adenopathy   Lungs CTAB   CV RR S1/S2   Abdomen: soft, non tender   No peripheral swelling, FROM hands ,wrists, elbows, shoulders, knees and ankles.  No pain on exam.   Neuro: non focal      Labs reviewed      A/P: stable lupus, no signs of disease activity.  Continue prednisone 2.5mg.  Discussed about taking plaquenil more consistently and alternating 200mg with 400mg every other day.  Check labs today.  RTC in 3-4 months.   Plan on discontinuing prednisone next visit or alternate first.      10/12/18  Zohreh feels well and has no complaints.  She recently travelled to [place] for her birthday with a friend.  She stopped taking prednisone 3 months ago and occasionally takes plaquenil once a week or less.  She has had a couple of episodes of knee pain or 3rd PIP right hand pain with swelling.  It will self resolve in a day or two, not taking any NSAIDs.  She denies nausea, vomiting no fever, no rashes, no oral ulcers, no GI or  complaints.      LMP end of September      Weight 120  /71  P 97  Pox 98  T 98   No rashes   No oral or nasal ulcers   No cervical adenopathy   Lungs CTAB   CV RR S1/S2   Abdomen: soft, non tender, no organomegaly   No peripheral swelling, FROM hands, wrists, elbows, shoulders, knees and ankles. No pain on exam.      A/P: clinically stable lupus.  Possible some mild flares with arthritis, self limited.  Likely due to non compliance with plaquenil.  Discussed importance to take plaquenil regularly to decrease her risk of flares.  She understands.  Alternate 200mg and 400mg.  Off prednisone for now.  Check labs today;.  RTC in 5 months.   She asked for a referral to nutritionist, due to concerns of low body weight, however gained almost 10 lbs since first visit in 2018.  Planning a trip to [place] in Dec and taking classes at [employer] for .      18 Zohreh experienced a couple of episodes of headaches since the weekend, unprovoked, usually right frontal and associated with episodes of blurry vision both eyes.  She went to ED yesterday after experiencing another episode of blurry vision and headache, she was given 40mg prednisone once and told to take Excedrin or motrin.  She didn’t have any blood tests.  She denies fever, no rashes, no oral ulcers, no chest pain or SOB.  No recent illness or exposed to sick people.  She has had rare episodes of headaches in the past.  She had on/off pain in knees recently with swelling.  She started taking plaquenil 400mg daily for the past 1 or 2 weeks and before was being inconsistent with plaquenil.  No new medication.  She is concerned headaches might be secondary to stress, she has been working 2 jobs recently.   weight 119.2  /79  P 82  Pox 97  T 97.2   no rashes   no oral or nasal ulcers   subcentimeter lymphonodes right and left cervical area, non tender, mobile.   Lungs CTAB   CV RR S1/S2   Abdomen: soft non tender   Neuro exam: non focal   Normal pupilary reflexes and extra ocular muscle testing   Normal muscle strength upper and lower extremities, normal reflexes       A/P: Recent episodes of headache/migraine.  No evidence from exam suggestive of active arthritis or neurological involvement.  No blurry vision today.  She has appt today with ophthalmology.  Recommend NSAIDs PRN when headache.  Decrease computer screen time if possible.  Continue plaquenil 400mg daily.  Check labs today, low suspicion for lupus flare.  Continue off steroids.      19  Zohreh walked in for recent fever, chest pain/coughing, she went to urgent care on Tue and given Levoquin.  She still had an episode of fever on Wednesday but not since.  She Feels better today, no more fever, no coughing, no joint pain.  She has been taking plaquenil 400mg.  She had one episode of headache since last time and was seen by ophthalmology, no changes recommended.   Weight 121  /73  P 101  Pox 100  T 99.2   No rashes   No oral ulcers, no erythema   Lungs CTAB   CV RR S1/S2   No peripheral swelling, no synovitis.      A/P: stable lupus, suspect recent URI.  Recommend completing levofloxacin.  Continue plaquenil 400mg.  labs from urgent care CBC and chemistries no significant abnormalities, mild anemia.  RTC In 3 months.      19  Zohreh has been having some intermittent epigastric discomfort and sometimes doesn’t eat as well because she is scared of what food might make her feel.  She usually only has one meal a day but trying to eat healthy.  She has seen a gastroenterologist and is scheduled for an upper endoscopy next week and prescribed omeprazole which she took for the first time today.   She also experiences intermittent joint pain, 5 days ago in 2nd digit left hand, with mild swelling.  No pain today.   She also has some more prominent lacy reticular rash in left thigh, no pain, no paresthesias.   Intermittent swelling of feet, usually towards end of day, specially when working after sitting for a long time at work.   weight 115  /71  P 116  Pox 100  T 97.6   faint reticular hyperpigmentation left thigh.   No oral ulcers   No cervical adenopathy   Lungs CTAB   CV RR S1/S2   No peripheral swelling, FROM hands, wrists, elbows, shoulders, knees and ankles.  No tenderness on palpation or mobilization joints above.     Neuro: non focal      A/P: lupus stable, no clear evidence of active disease.  We will check labs today.   Discussed about more frequent meals, healthy dieting, appropriate intake of plaquenil, compliance with omeprazole.   Exercise and avoid prolonged periods of sitting.  Stretching.   Lacy hyperpigmentation left thigh, f/u derm, unlikely lupus, possibly from plaquenil?   Topical diclofenac PRN.  RTC in 3 months.      19 Zohreh was admitted to the hospital on  for fever and positive blood culture she had at her PMD on .  She was noted to have pancytopenia, fever and arthritis.  She was treated with prednisone 20mg and discharged feeling much better on 19.  She declined plaquenil and had stopped using plaquenil a couple of months back   MRI liver demonstrated a liver adnnoma.  CMV was positive for IgM and IgG.  Viral PCR not done.        19  Zohreh missed a copule of appointments since she was discharged from hospital recently.  She called today complaining of more pain in hands, wrists.  She would like to come for blood tests.  I spoke with her about needing to come to office and beeing seen in person, we need to discuss DMARD therapy, I explained that my recommendation would be to restart plaquenil and talk about why she didn’t want to continue it.  Her flares in the past have always occurred after stopping plaquenil.  She would like to have flexibility to come in other days of the week or even in the afternoon, so I suggested I will check with Bisi Blevins and Philip if they could see her.  I refilled her prescription for prednisone 10mg daily but urged her to come next week.      21 Zohreh had followed with Dr Veronica between  and . She has been chronically on prednisone 5mg daily and has not used plaquenil since . She states she has not experienced any flares since . She has tried to taper prednisone and alternate days but can’t tolerate since she feels very tired/fatigued on days she doesn’t take prednisone. She denies any rashes, no oral ulcers, no weight loss, no Raynauds. She has been taking vitamin/supplements and feels it helps with hair growth.    She denies any constitutional symptoms.    No rashes   No oral or nasal ulcers   No cervical adenopathy   Lungs CTAB   CV RR S1/S2   No peripheral swelling, no joint pain on palpation or mobilization, FROM hands, wrists, elbows, shoulders, knees and ankles.       A/P: lupus clinically stable. Discussed about tapering prednisone slowly with 4mg then 3mg daily and restart plaquenil 100mg daily. After a few weeks, increase to 400mg daily and attempt to further taper prednisone with goal of discontinuing.   Patient agreed with plan. Discussed prednisone risks of AVN, DM, Eye disease, HTN, weight gain.   Check labs today. RTC in 3 months.       21Zohreh is here for follow up of lupus. She feels well and denies any rashes, no oral ulcers, no fever, no significant joint pain or swelling. She tried taking plaquenil for 1 month however she could not tolerate it again. She feels tired, fatigued all the time when taking plaquenil, and feels depressed. She doesn’t have any GI intolerance. She has had normal appetite, no GI or  complaints. She was able to taper prednisone to 3mg daily however 2 weeks ago she started having more generalized myalgia/ arthalgia with mild swelling of hands. TOday she has no swelling and no pain.    She increased prednisone to 5mg again 2 days ago and feels that it might have helped. 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 Still working as well.   No rashes   no oral or nasal ulcers   No cervical adenopathy   Lungs CTAB   CV RR S1/S2   No peripheral swelling, FROM hands, wrists, elbows, shoulders, knees and ankles. No tenderness on palpation or moblization joints above.       A/P Lupus clinically stable. Plaquenil as tolerated, discussed about trying alternating days, once every 2 days. COntinue prednisone 5mg for 1 week, then attempt to decrease again as tolerated.    She has been taking vit D 50kIU weekly. Check labs today. RTC In 3 months.       10/22/21 Patient is here for follow up of lupus. Zohreh was able to taper prednisone off about 1 month ago and has been using plaquenil 200mg daily. She has not had any flare but occasional episodes of joint pain or swelling. She doesn’t have any pain today. However, she feels that she has been having side effects from plaquenil with symptoms of anxiety. Yesterday she teared up while having a phone conversation about appointment. She feels that a lot is going on in her life, stressed about upcoming test. She denies any constitutional symptoms, no fever, no rashes, no oral ulcers, no Raynauds.    Weight 118  /68  P 76  POx 99  Temp 97.8   No rashes   No cervical adenopathy   No oral ulcers   Lungs CTAB   CV RR S1/S2   No peripheral swelling, FROM hands, wrists, elbows,s houlders, knees and ankles. No tenderness on palpation or mobilization joints above.    Neuro: grossly intact      A/P lupus clinically stable however patient attributes increase anxiety symptoms to plaquenil and would like to consider alternative therapy. I discussed that I would recommend benlysta however she says she was given the option in the past and didn’t want to use it. I would not recommend other DMARD at this time (e.g. MTX or AZA). I also mentioned that it was great she tapered prednisone off. I suggested she also sees a mental health provider to discuss more about strategies for coping with stress. We will check labs today to rule out impending lupus flare. I will call her after we get results. RTC in 3 months.       10/29/21 Called patient to follow up test results. Unremarkable labs. Lupus markers are lower than previous testing. Recommend follow up with mental health for new strategies on how to cope with stress.        22 cc: patient is here for follow up of lupus. She has been feeling well and offers no complaints. After she saw me last October, she stopped taking plaquenil because of senstaion that she can’t tolerate it, makes her feel weak, stomach discomfort. And she restarted prednisone 3mg daily. No new medications. Denies rashes, no oral ulcers, no peripheral joint pain or swelling. No weight loss.    Not takin OCP   LMP Feb 5.       Weight 123  /75  P 92  POx 100   No rashes   No cervical adenopathy   No oral or nasal ulcers   Lungs CTAB   CV RR S1/S2   No peripheral swelling, FROM hands, wrists, elbows, shoulders, knees and ankles.    No tenderness on palpation or mobilizaiton joints above.       A/P: lupus clinically stable. Will check labs toay. RTC in 4 months. Discussed about restarting plaquenil 200mg daily and decreasing prednisone to 2mg daily and further if tolerated.    Discussed with patient study on PPG/brain imaging. Discussed consent, patient was given the opportunity to read consent as well and ask questions. She agreed to participate.          Ophthalmology evaluation 2018  Dec 2018

## 2022-02-18 NOTE — HISTORY OF PRESENT ILLNESS
[FreeTextEntry1] : Zohreh Aguero   [birth year]         Initial rheumatology evaluation 18.   H/O lupus diagnosed in Summer 2017   + DNA >400, + Sm >8  + RNP, low complements, low WBC.  At the time she had generalized fatigue, joint pain and swelling.  She was given prednisone 10mg and plaquenil, however patient never took plaquenil and was very inconsistent with prednisone, she would take 2-3 times a month.  She has also had episodes of migraine since summer of 2017 but doesn’t take any medications.   Admitted to hospital in 2018 with symptoms of fever 102-103 intermittent for a week, fatigue, arthalgias.  Initially treated for presumed UTI but all cultures were negative.   Pancytopenia noted WBC 2.9, Plt 85, Hgb 9.4  Hct 31, KENAN negative, ,  C3  45, C4 6.  She was treated with prednisone 60mg daily and discharged on a taper of 10mg every week.   Today she is taking prednisone 50mg daily for a week and plaquenil 400mg.   She does not have any pain today but feels tired and generalized fatigue with a feeling that someone is sitting on her chest.     She denies any rashes or photosensitivity, no oral ulcers, no Raynauds.  She lost about 10 lbs during the weeks preceding her hospital admission, had low appetite at the time.  Now she has a good appetite and gaining weight.  Peripheral swelling and pain resolved.  She has had diffuse hair thinning since last summer.      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  She would like to resume work and school this September.      LMP  (usually regular)      No toxic habits      Not sexually active at the time.     1 pregnancy, 1       No family history of lupus or other CTD      Height 5’5”  weight 112.4   /64  P 97  Pox 99  T 97.2   No rashes   No oral or nasal ulcers   No cervical adenopathy   Lungs CTAB   CV RR S1/S2   Abdomen: soft, non tender, no organomegaly   No peripheral swelling, FROM hands, wrists, elbows, shoulders, knees and ankles.  No pain on exam.   Hips FROM   Normal muscle strength upper and lower extremities   Neuro: non focal         A/P: lupus s/p recent flare arthritis/ thrombocytopenia/ fever now clinically improved.  Discussed at length treatment and expectations.   She will continue on plaquenil 400mg and continue taper of prednisone 10mg every week.  RTC in 4 weeks at 10mg prednisone daily.   She will go to a support group meeting this afternoon.   Check labs today.   Introduced therapeutic trials and specimen bank.  To discuss more next visit.   Discussed vaccinations.      18  Zohreh feels well.  She has been compliant with plaquenil 400mg and has decreased prednisone to 10mg for the past 2 weeks.  She has occasional arthalgias/myalgias, but usually resolves within same day and she doesn’t take any NSAIDs, no Tylenol.   She denies any rashes, no oral or nasal ulcers, no weight loss, no Raynauds, no sicca symptoms.   She has been experiencing headaches but much milder than previously.  1-2 per week and also usually improves after laying down, resting.   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      Weight 117  /73  P 100  Pox 98  T 99   No rashes   No oral or nasal ulcers   No cervical adenopathy   Lungs CTAB   Cv RR S1/S2   No peripheral swelling, FROM hands, wrists, elbows, shoulders, knees and ankles.  No pain on mobilization or palpation.      Labs reviewed      A/P: stable lupus.  Continue plaquenil 400mg daily.  Taper prednisone to 5mg daily then after 10 days further taper to 2.5mg.  RTC In 9 weeks.  Check labs today      18  She feels well and has no complaints.  She hasn’t had significant joint pain.  Occasional pain and swelling of digits but improves on same or next day.  She hasn’t taken any NSAIDs or Tylenol.  Compliant with prednisone 2.5mg daily and not so compliant with plaquenil.  She takes it 2-3 times a week.  She feels she has to have some food when taking meds and not eating as consistently.  No weight loss.  No rashes, no oral or nasal ulcers.   She travelled to [place] recently for 1 week as a graduation present.  She had a great time, snorkeling, kayaking.  She wore sunscreen and protective clothing.   Weight 118  BP 97/66  P 98  T 98.1   No rashes   No oral or nasal ulcers   No cervical adenopathy   Lungs CTAB   CV RR S1/S2   Abdomen: soft, non tender   No peripheral swelling, FROM hands ,wrists, elbows, shoulders, knees and ankles.  No pain on exam.   Neuro: non focal      Labs reviewed      A/P: stable lupus, no signs of disease activity.  Continue prednisone 2.5mg.  Discussed about taking plaquenil more consistently and alternating 200mg with 400mg every other day.  Check labs today.  RTC in 3-4 months.   Plan on discontinuing prednisone next visit or alternate first.      10/12/18  Zohreh feels well and has no complaints.  She recently travelled to [place] for her birthday with a friend.  She stopped taking prednisone 3 months ago and occasionally takes plaquenil once a week or less.  She has had a couple of episodes of knee pain or 3rd PIP right hand pain with swelling.  It will self resolve in a day or two, not taking any NSAIDs.  She denies nausea, vomiting no fever, no rashes, no oral ulcers, no GI or  complaints.      LMP end of September      Weight 120  /71  P 97  Pox 98  T 98   No rashes   No oral or nasal ulcers   No cervical adenopathy   Lungs CTAB   CV RR S1/S2   Abdomen: soft, non tender, no organomegaly   No peripheral swelling, FROM hands, wrists, elbows, shoulders, knees and ankles. No pain on exam.      A/P: clinically stable lupus.  Possible some mild flares with arthritis, self limited.  Likely due to non compliance with plaquenil.  Discussed importance to take plaquenil regularly to decrease her risk of flares.  She understands.  Alternate 200mg and 400mg.  Off prednisone for now.  Check labs today;.  RTC in 5 months.   She asked for a referral to nutritionist, due to concerns of low body weight, however gained almost 10 lbs since first visit in 2018.  Planning a trip to [place] in Dec and taking classes at [employer] for .      18 Zohreh experienced a couple of episodes of headaches since the weekend, unprovoked, usually right frontal and associated with episodes of blurry vision both eyes.  She went to ED yesterday after experiencing another episode of blurry vision and headache, she was given 40mg prednisone once and told to take Excedrin or motrin.  She didn’t have any blood tests.  She denies fever, no rashes, no oral ulcers, no chest pain or SOB.  No recent illness or exposed to sick people.  She has had rare episodes of headaches in the past.  She had on/off pain in knees recently with swelling.  She started taking plaquenil 400mg daily for the past 1 or 2 weeks and before was being inconsistent with plaquenil.  No new medication.  She is concerned headaches might be secondary to stress, she has been working 2 jobs recently.   weight 119.2  /79  P 82  Pox 97  T 97.2   no rashes   no oral or nasal ulcers   subcentimeter lymphonodes right and left cervical area, non tender, mobile.   Lungs CTAB   CV RR S1/S2   Abdomen: soft non tender   Neuro exam: non focal   Normal pupilary reflexes and extra ocular muscle testing   Normal muscle strength upper and lower extremities, normal reflexes       A/P: Recent episodes of headache/migraine.  No evidence from exam suggestive of active arthritis or neurological involvement.  No blurry vision today.  She has appt today with ophthalmology.  Recommend NSAIDs PRN when headache.  Decrease computer screen time if possible.  Continue plaquenil 400mg daily.  Check labs today, low suspicion for lupus flare.  Continue off steroids.      19  Zohreh walked in for recent fever, chest pain/coughing, she went to urgent care on Tue and given Levoquin.  She still had an episode of fever on Wednesday but not since.  She Feels better today, no more fever, no coughing, no joint pain.  She has been taking plaquenil 400mg.  She had one episode of headache since last time and was seen by ophthalmology, no changes recommended.   Weight 121  /73  P 101  Pox 100  T 99.2   No rashes   No oral ulcers, no erythema   Lungs CTAB   CV RR S1/S2   No peripheral swelling, no synovitis.      A/P: stable lupus, suspect recent URI.  Recommend completing levofloxacin.  Continue plaquenil 400mg.  labs from urgent care CBC and chemistries no significant abnormalities, mild anemia.  RTC In 3 months.      19  Zohreh has been having some intermittent epigastric discomfort and sometimes doesn’t eat as well because she is scared of what food might make her feel.  She usually only has one meal a day but trying to eat healthy.  She has seen a gastroenterologist and is scheduled for an upper endoscopy next week and prescribed omeprazole which she took for the first time today.   She also experiences intermittent joint pain, 5 days ago in 2nd digit left hand, with mild swelling.  No pain today.   She also has some more prominent lacy reticular rash in left thigh, no pain, no paresthesias.   Intermittent swelling of feet, usually towards end of day, specially when working after sitting for a long time at work.   weight 115  /71  P 116  Pox 100  T 97.6   faint reticular hyperpigmentation left thigh.   No oral ulcers   No cervical adenopathy   Lungs CTAB   CV RR S1/S2   No peripheral swelling, FROM hands, wrists, elbows, shoulders, knees and ankles.  No tenderness on palpation or mobilization joints above.     Neuro: non focal      A/P: lupus stable, no clear evidence of active disease.  We will check labs today.   Discussed about more frequent meals, healthy dieting, appropriate intake of plaquenil, compliance with omeprazole.   Exercise and avoid prolonged periods of sitting.  Stretching.   Lacy hyperpigmentation left thigh, f/u derm, unlikely lupus, possibly from plaquenil?   Topical diclofenac PRN.  RTC in 3 months.      19 Zohreh was admitted to the hospital on  for fever and positive blood culture she had at her PMD on .  She was noted to have pancytopenia, fever and arthritis.  She was treated with prednisone 20mg and discharged feeling much better on 19.  She declined plaquenil and had stopped using plaquenil a couple of months back   MRI liver demonstrated a liver adnnoma.  CMV was positive for IgM and IgG.  Viral PCR not done.        19  Zohreh missed a copule of appointments since she was discharged from hospital recently.  She called today complaining of more pain in hands, wrists.  She would like to come for blood tests.  I spoke with her about needing to come to office and beeing seen in person, we need to discuss DMARD therapy, I explained that my recommendation would be to restart plaquenil and talk about why she didn’t want to continue it.  Her flares in the past have always occurred after stopping plaquenil.  She would like to have flexibility to come in other days of the week or even in the afternoon, so I suggested I will check with Bisi Blevins and Philip if they could see her.  I refilled her prescription for prednisone 10mg daily but urged her to come next week.      21 Zohreh had followed with Dr Veronica between  and . She has been chronically on prednisone 5mg daily and has not used plaquenil since . She states she has not experienced any flares since . She has tried to taper prednisone and alternate days but can’t tolerate since she feels very tired/fatigued on days she doesn’t take prednisone. She denies any rashes, no oral ulcers, no weight loss, no Raynauds. She has been taking vitamin/supplements and feels it helps with hair growth.    She denies any constitutional symptoms.    No rashes   No oral or nasal ulcers   No cervical adenopathy   Lungs CTAB   CV RR S1/S2   No peripheral swelling, no joint pain on palpation or mobilization, FROM hands, wrists, elbows, shoulders, knees and ankles.       A/P: lupus clinically stable. Discussed about tapering prednisone slowly with 4mg then 3mg daily and restart plaquenil 100mg daily. After a few weeks, increase to 400mg daily and attempt to further taper prednisone with goal of discontinuing.   Patient agreed with plan. Discussed prednisone risks of AVN, DM, Eye disease, HTN, weight gain.   Check labs today. RTC in 3 months.       21Zohreh is here for follow up of lupus. She feels well and denies any rashes, no oral ulcers, no fever, no significant joint pain or swelling. She tried taking plaquenil for 1 month however she could not tolerate it again. She feels tired, fatigued all the time when taking plaquenil, and feels depressed. She doesn’t have any GI intolerance. She has had normal appetite, no GI or  complaints. She was able to taper prednisone to 3mg daily however 2 weeks ago she started having more generalized myalgia/ arthalgia with mild swelling of hands. TOday she has no swelling and no pain.    She increased prednisone to 5mg again 2 days ago and feels that it might have helped. 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 Still working as well.   No rashes   no oral or nasal ulcers   No cervical adenopathy   Lungs CTAB   CV RR S1/S2   No peripheral swelling, FROM hands, wrists, elbows, shoulders, knees and ankles. No tenderness on palpation or moblization joints above.       A/P Lupus clinically stable. Plaquenil as tolerated, discussed about trying alternating days, once every 2 days. COntinue prednisone 5mg for 1 week, then attempt to decrease again as tolerated.    She has been taking vit D 50kIU weekly. Check labs today. RTC In 3 months.       10/22/21 Patient is here for follow up of lupus. Zohreh was able to taper prednisone off about 1 month ago and has been using plaquenil 200mg daily. She has not had any flare but occasional episodes of joint pain or swelling. She doesn’t have any pain today. However, she feels that she has been having side effects from plaquenil with symptoms of anxiety. Yesterday she teared up while having a phone conversation about appointment. She feels that a lot is going on in her life, stressed about upcoming test. She denies any constitutional symptoms, no fever, no rashes, no oral ulcers, no Raynauds.    Weight 118  /68  P 76  POx 99  Temp 97.8   No rashes   No cervical adenopathy   No oral ulcers   Lungs CTAB   CV RR S1/S2   No peripheral swelling, FROM hands, wrists, elbows,s houlders, knees and ankles. No tenderness on palpation or mobilization joints above.    Neuro: grossly intact      A/P lupus clinically stable however patient attributes increase anxiety symptoms to plaquenil and would like to consider alternative therapy. I discussed that I would recommend benlysta however she says she was given the option in the past and didn’t want to use it. I would not recommend other DMARD at this time (e.g. MTX or AZA). I also mentioned that it was great she tapered prednisone off. I suggested she also sees a mental health provider to discuss more about strategies for coping with stress. We will check labs today to rule out impending lupus flare. I will call her after we get results. RTC in 3 months.       10/29/21 Called patient to follow up test results. Unremarkable labs. Lupus markers are lower than previous testing. Recommend follow up with mental health for new strategies on how to cope with stress.        22 cc: patient is here for follow up of lupus. She has been feeling well and offers no complaints. After she saw me last October, she stopped taking plaquenil because of senstaion that she can’t tolerate it, makes her feel weak, stomach discomfort. And she restarted prednisone 3mg daily. No new medications. Denies rashes, no oral ulcers, no peripheral joint pain or swelling. No weight loss.    Not takin OCP   LMP Feb 5.       Weight 123  /75  P 92  POx 100   No rashes   No cervical adenopathy   No oral or nasal ulcers   Lungs CTAB   CV RR S1/S2   No peripheral swelling, FROM hands, wrists, elbows, shoulders, knees and ankles.    No tenderness on palpation or mobilizaiton joints above.       A/P: lupus clinically stable. Will check labs toay. RTC in 4 months. Discussed about restarting plaquenil 200mg daily and decreasing prednisone to 2mg daily and further if tolerated.    Discussed with patient study on PPG/brain imaging. Discussed consent, patient was given the opportunity to read consent as well and ask questions. She agreed to participate.          Ophthalmology evaluation 2018  Dec 2018

## 2022-04-08 ENCOUNTER — APPOINTMENT (OUTPATIENT)
Dept: MRI IMAGING | Facility: HOSPITAL | Age: 27
End: 2022-04-08

## 2022-04-08 ENCOUNTER — APPOINTMENT (OUTPATIENT)
Dept: RHEUMATOLOGY | Facility: CLINIC | Age: 27
End: 2022-04-08

## 2022-04-08 ENCOUNTER — OUTPATIENT (OUTPATIENT)
Dept: OUTPATIENT SERVICES | Facility: HOSPITAL | Age: 27
LOS: 1 days | End: 2022-04-08
Payer: SUBSIDIZED

## 2022-04-08 ENCOUNTER — RESULT REVIEW (OUTPATIENT)
Age: 27
End: 2022-04-08

## 2022-04-08 DIAGNOSIS — Z00.6 ENCOUNTER FOR EXAMINATION FOR NORMAL COMPARISON AND CONTROL IN CLINICAL RESEARCH PROGRAM: ICD-10-CM

## 2022-04-08 DIAGNOSIS — Z00.00 ENCOUNTER FOR GENERAL ADULT MEDICAL EXAMINATION WITHOUT ABNORMAL FINDINGS: ICD-10-CM

## 2022-04-08 PROCEDURE — 70551 MRI BRAIN STEM W/O DYE: CPT

## 2022-04-08 PROCEDURE — 70551 MRI BRAIN STEM W/O DYE: CPT | Mod: 26

## 2022-04-08 NOTE — HISTORY OF PRESENT ILLNESS
[FreeTextEntry1] : Zohreh Aguero \par \par [birth year] \par \par  \par \par  \par \par Initial rheumatology evaluation 18. \par \par H/O lupus diagnosed in Summer 2017   + DNA >400, + Sm >8  + RNP, low complements, low WBC.  At the time she had generalized fatigue, joint pain and swelling.  She was given prednisone 10mg and plaquenil, however patient never took plaquenil and was very inconsistent with prednisone, she would take 2-3 times a month.  She has also had episodes of migraine since summer of 2017 but doesn’t take any medications. \par \par Admitted to hospital in 2018 with symptoms of fever 102-103 intermittent for a week, fatigue, arthalgias.  Initially treated for presumed UTI but all cultures were negative.   Pancytopenia noted WBC 2.9, Plt 85, Hgb 9.4  Hct 31, KENAN negative, ,  C3  45, C4 6.  She was treated with prednisone 60mg daily and discharged on a taper of 10mg every week. \par \par Today she is taking prednisone 50mg daily for a week and plaquenil 400mg. \par \par She does not have any pain today but feels tired and generalized fatigue with a feeling that someone is sitting on her chest.   \par \par She denies any rashes or photosensitivity, no oral ulcers, no Raynauds.  She lost about 10 lbs during the weeks preceding her hospital admission, had low appetite at the time.  Now she has a good appetite and gaining weight.  Peripheral swelling and pain resolved.  She has had diffuse hair thinning since last summer. 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  She would like to resume work and school this September. \par \par  \par \par LMP  (usually regular) \par \par  \par \par No toxic habits \par \par  \par \par Not sexually active at the time.   \par \par 1 pregnancy, 1  \par \par  \par \par No family history of lupus or other CTD \par \par  \par \par Height 5’5”  weight 112.4 \par \par /64  P 97  Pox 99  T 97.2 \par \par No rashes \par \par No oral or nasal ulcers \par \par No cervical adenopathy \par \par Lungs CTAB \par \par CV RR S1/S2 \par \par Abdomen: soft, non tender, no organomegaly \par \par No peripheral swelling, FROM hands, wrists, elbows, shoulders, knees and ankles.  No pain on exam. \par \par Hips FROM \par \par Normal muscle strength upper and lower extremities \par \par Neuro: non focal \par \par  \par \par  \par \par A/P: lupus s/p recent flare arthritis/ thrombocytopenia/ fever now clinically improved.  Discussed at length treatment and expectations.   She will continue on plaquenil 400mg and continue taper of prednisone 10mg every week.  RTC in 4 weeks at 10mg prednisone daily. \par \par She will go to a support group meeting this afternoon. \par \par Check labs today. \par \par Introduced therapeutic trials and specimen bank.  To discuss more next visit. \par \par Discussed vaccinations. \par \par  \par \par 18  Zohreh feels well.  She has been compliant with plaquenil 400mg and has decreased prednisone to 10mg for the past 2 weeks.  She has occasional arthalgias/myalgias, but usually resolves within same day and she doesn’t take any NSAIDs, no Tylenol. \par \par She denies any rashes, no oral or nasal ulcers, no weight loss, no Raynauds, no sicca symptoms. \par \par She has been experiencing headaches but much milder than previously.  1-2 per week and also usually improves after laying down, resting. 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 \par \par  \par \par Weight 117  /73  P 100  Pox 98  T 99 \par \par No rashes \par \par No oral or nasal ulcers \par \par No cervical adenopathy \par \par Lungs CTAB \par \par Cv RR S1/S2 \par \par No peripheral swelling, FROM hands, wrists, elbows, shoulders, knees and ankles.  No pain on mobilization or palpation. \par \par  \par \par Labs reviewed \par \par  \par \par A/P: stable lupus.  Continue plaquenil 400mg daily.  Taper prednisone to 5mg daily then after 10 days further taper to 2.5mg.  RTC In 9 weeks.  Check labs today \par \par  \par \par 18  She feels well and has no complaints.  She hasn’t had significant joint pain.  Occasional pain and swelling of digits but improves on same or next day.  She hasn’t taken any NSAIDs or Tylenol.  Compliant with prednisone 2.5mg daily and not so compliant with plaquenil.  She takes it 2-3 times a week.  She feels she has to have some food when taking meds and not eating as consistently.  No weight loss.  No rashes, no oral or nasal ulcers. \par \par She travelled to [place] recently for 1 week as a graduation present.  She had a great time, snorkeling, kayaking.  She wore sunscreen and protective clothing. \par \par Weight 118  BP 97/66  P 98  T 98.1 \par \par No rashes \par \par No oral or nasal ulcers \par \par No cervical adenopathy \par \par Lungs CTAB \par \par CV RR S1/S2 \par \par Abdomen: soft, non tender \par \par No peripheral swelling, FROM hands ,wrists, elbows, shoulders, knees and ankles.  No pain on exam. \par \par Neuro: non focal \par \par  \par \par Labs reviewed \par \par  \par \par A/P: stable lupus, no signs of disease activity.  Continue prednisone 2.5mg.  Discussed about taking plaquenil more consistently and alternating 200mg with 400mg every other day.  Check labs today.  RTC in 3-4 months. \par \par Plan on discontinuing prednisone next visit or alternate first. \par \par  \par \par 10/12/18  Zohreh feels well and has no complaints.  She recently travelled to [place] for her birthday with a friend.  She stopped taking prednisone 3 months ago and occasionally takes plaquenil once a week or less.  She has had a couple of episodes of knee pain or 3rd PIP right hand pain with swelling.  It will self resolve in a day or two, not taking any NSAIDs.  She denies nausea, vomiting no fever, no rashes, no oral ulcers, no GI or  complaints. \par \par  \par \par LMP end of September \par \par  \par \par Weight 120  /71  P 97  Pox 98  T 98 \par \par No rashes \par \par No oral or nasal ulcers \par \par No cervical adenopathy \par \par Lungs CTAB \par \par CV RR S1/S2 \par \par Abdomen: soft, non tender, no organomegaly \par \par No peripheral swelling, FROM hands, wrists, elbows, shoulders, knees and ankles. No pain on exam. \par \par  \par \par A/P: clinically stable lupus.  Possible some mild flares with arthritis, self limited.  Likely due to non compliance with plaquenil.  Discussed importance to take plaquenil regularly to decrease her risk of flares.  She understands.  Alternate 200mg and 400mg.  Off prednisone for now.  Check labs today;.  RTC in 5 months. \par \par She asked for a referral to nutritionist, due to concerns of low body weight, however gained almost 10 lbs since first visit in 2018.  Planning a trip to [place] in Dec and taking classes at [employer] for . \par \par  \par \par 18 Zohreh experienced a couple of episodes of headaches since the weekend, unprovoked, usually right frontal and associated with episodes of blurry vision both eyes.  She went to ED yesterday after experiencing another episode of blurry vision and headache, she was given 40mg prednisone once and told to take Excedrin or motrin.  She didn’t have any blood tests.  She denies fever, no rashes, no oral ulcers, no chest pain or SOB.  No recent illness or exposed to sick people.  She has had rare episodes of headaches in the past.  She had on/off pain in knees recently with swelling.  She started taking plaquenil 400mg daily for the past 1 or 2 weeks and before was being inconsistent with plaquenil.  No new medication.  She is concerned headaches might be secondary to stress, she has been working 2 jobs recently. \par \par weight 119.2  /79  P 82  Pox 97  T 97.2 \par \par no rashes \par \par no oral or nasal ulcers \par \par subcentimeter lymphonodes right and left cervical area, non tender, mobile. \par \par Lungs CTAB \par \par CV RR S1/S2 \par \par Abdomen: soft non tender \par \par Neuro exam: non focal \par \par Normal pupilary reflexes and extra ocular muscle testing \par \par Normal muscle strength upper and lower extremities, normal reflexes \par \par   \par \par A/P: Recent episodes of headache/migraine.  No evidence from exam suggestive of active arthritis or neurological involvement.  No blurry vision today.  She has appt today with ophthalmology.  Recommend NSAIDs PRN when headache.  Decrease computer screen time if possible.  Continue plaquenil 400mg daily.  Check labs today, low suspicion for lupus flare.  Continue off steroids. \par \par  \par \par 19  Zohreh walked in for recent fever, chest pain/coughing, she went to urgent care on Tue and given Levoquin.  She still had an episode of fever on Wednesday but not since.  She Feels better today, no more fever, no coughing, no joint pain.  She has been taking plaquenil 400mg.  She had one episode of headache since last time and was seen by ophthalmology, no changes recommended. \par \par Weight 121  /73  P 101  Pox 100  T 99.2 \par \par No rashes \par \par No oral ulcers, no erythema \par \par Lungs CTAB \par \par CV RR S1/S2 \par \par No peripheral swelling, no synovitis. \par \par  \par \par A/P: stable lupus, suspect recent URI.  Recommend completing levofloxacin.  Continue plaquenil 400mg.  labs from urgent care CBC and chemistries no significant abnormalities, mild anemia.  RTC In 3 months. \par \par  \par \par 19  Zohreh has been having some intermittent epigastric discomfort and sometimes doesn’t eat as well because she is scared of what food might make her feel.  She usually only has one meal a day but trying to eat healthy.  She has seen a gastroenterologist and is scheduled for an upper endoscopy next week and prescribed omeprazole which she took for the first time today. \par \par She also experiences intermittent joint pain, 5 days ago in 2nd digit left hand, with mild swelling.  No pain today. \par \par She also has some more prominent lacy reticular rash in left thigh, no pain, no paresthesias. \par \par Intermittent swelling of feet, usually towards end of day, specially when working after sitting for a long time at work. \par \par weight 115  /71  P 116  Pox 100  T 97.6 \par \par faint reticular hyperpigmentation left thigh. \par \par No oral ulcers \par \par No cervical adenopathy \par \par Lungs CTAB \par \par CV RR S1/S2 \par \par No peripheral swelling, FROM hands, wrists, elbows, shoulders, knees and ankles.  No tenderness on palpation or mobilization joints above.   \par \par Neuro: non focal \par \par  \par \par A/P: lupus stable, no clear evidence of active disease.  We will check labs today. \par \par Discussed about more frequent meals, healthy dieting, appropriate intake of plaquenil, compliance with omeprazole. \par \par Exercise and avoid prolonged periods of sitting.  Stretching. \par \par Lacy hyperpigmentation left thigh, f/u derm, unlikely lupus, possibly from plaquenil? \par \par Topical diclofenac PRN. \par RTC in 3 months. \par \par  \par \par 19 Zohreh was admitted to the hospital on  for fever and positive blood culture she had at her PMD on .  She was noted to have pancytopenia, fever and arthritis.  She was treated with prednisone 20mg and discharged feeling much better on 19.  She declined plaquenil and had stopped using plaquenil a couple of months back   MRI liver demonstrated a liver adnnoma.  CMV was positive for IgM and IgG.  Viral PCR not done.   \par \par  \par \par 19  Zohreh missed a copule of appointments since she was discharged from hospital recently.  She called today complaining of more pain in hands, wrists.  She would like to come for blood tests.  I spoke with her about needing to come to office and beeing seen in person, we need to discuss DMARD therapy, I explained that my recommendation would be to restart plaquenil and talk about why she didn’t want to continue it.  Her flares in the past have always occurred after stopping plaquenil.  She would like to have flexibility to come in other days of the week or even in the afternoon, so I suggested I will check with Drs Gen and Philip if they could see her.  I refilled her prescription for prednisone 10mg daily but urged her to come next week. \par \par  \par \par 21 Zohreh had followed with Dr Veronica between  and . She has been chronically on prednisone 5mg daily and has not used plaquenil since . She states she has not experienced any flares since . She has tried to taper prednisone and alternate days but can’t tolerate since she feels very tired/fatigued on days she doesn’t take prednisone. She denies any rashes, no oral ulcers, no weight loss, no Raynauds. She has been taking vitamin/supplements and feels it helps with hair growth.  \par \par She denies any constitutional symptoms.  \par \par No rashes \par \par No oral or nasal ulcers \par \par No cervical adenopathy \par \par Lungs CTAB \par \par CV RR S1/S2 \par \par No peripheral swelling, no joint pain on palpation or mobilization, FROM hands, wrists, elbows, shoulders, knees and ankles.  \par \par  \par \par A/P: lupus clinically stable. Discussed about tapering prednisone slowly with 4mg then 3mg daily and restart plaquenil 100mg daily. After a few weeks, increase to 400mg daily and attempt to further taper prednisone with goal of discontinuing. \par \par Patient agreed with plan. Discussed prednisone risks of AVN, DM, Eye disease, HTN, weight gain. \par \par Check labs today. RTC in 3 months.  \par \par  \par \par 21Zohreh is here for follow up of lupus. She feels well and denies any rashes, no oral ulcers, no fever, no significant joint pain or swelling. She tried taking plaquenil for 1 month however she could not tolerate it again. She feels tired, fatigued all the time when taking plaquenil, and feels depressed. She doesn’t have any GI intolerance. She has had normal appetite, no GI or  complaints. She was able to taper prednisone to 3mg daily however 2 weeks ago she started having more generalized myalgia/ arthalgia with mild swelling of hands. TOday she has no swelling and no pain.  \par \par She increased prednisone to 5mg again 2 days ago and feels that it might have helped. 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 Still working as well. \par \par No rashes \par \par no oral or nasal ulcers \par \par No cervical adenopathy \par \par Lungs CTAB \par \par CV RR S1/S2 \par \par No peripheral swelling, FROM hands, wrists, elbows, shoulders, knees and ankles. No tenderness on palpation or moblization joints above.  \par \par  \par \par A/P Lupus clinically stable. Plaquenil as tolerated, discussed about trying alternating days, once every 2 days. COntinue prednisone 5mg for 1 week, then attempt to decrease again as tolerated.  \par \par She has been taking vit D 50kIU weekly. Check labs today. RTC In 3 months.  \par \par  \par \par 10/22/21 Patient is here for follow up of lupus. Zohreh was able to taper prednisone off about 1 month ago and has been using plaquenil 200mg daily. She has not had any flare but occasional episodes of joint pain or swelling. She doesn’t have any pain today. However, she feels that she has been having side effects from plaquenil with symptoms of anxiety. Yesterday she teared up while having a phone conversation about appointment. She feels that a lot is going on in her life, stressed about upcoming test. She denies any constitutional symptoms, no fever, no rashes, no oral ulcers, no Raynauds.  \par \par Weight 118  /68  P 76  POx 99  Temp 97.8 \par \par No rashes \par \par No cervical adenopathy \par \par No oral ulcers \par \par Lungs CTAB \par \par CV RR S1/S2 \par \par No peripheral swelling, FROM hands, wrists, elbows,s houlders, knees and ankles. No tenderness on palpation or mobilization joints above.  \par \par Neuro: grossly intact \par \par  \par \par A/P lupus clinically stable however patient attributes increase anxiety symptoms to plaquenil and would like to consider alternative therapy. I discussed that I would recommend benlysta however she says she was given the option in the past and didn’t want to use it. I would not recommend other DMARD at this time (e.g. MTX or AZA). I also mentioned that it was great she tapered prednisone off. I suggested she also sees a mental health provider to discuss more about strategies for coping with stress. We will check labs today to rule out impending lupus flare. I will call her after we get results. RTC in 3 months.  \par \par  \par \par 10/29/21 Called patient to follow up test results. Unremarkable labs. Lupus markers are lower than previous testing. Recommend follow up with mental health for new strategies on how to cope with stress.   \par \par  \par \par 22 cc: patient is here for follow up of lupus. She has been feeling well and offers no complaints. After she saw me last October, she stopped taking plaquenil because of senstaion that she can’t tolerate it, makes her feel weak, stomach discomfort. And she restarted prednisone 3mg daily. No new medications. Denies rashes, no oral ulcers, no peripheral joint pain or swelling. No weight loss.  \par \par Not takin OCP \par \par LMP Feb 5.  \par \par  \par \par Weight 123  /75  P 92  POx 100 \par \par No rashes \par \par No cervical adenopathy \par \par No oral or nasal ulcers \par \par Lungs CTAB \par \par CV RR S1/S2 \par \par No peripheral swelling, FROM hands, wrists, elbows, shoulders, knees and ankles.  \par \par No tenderness on palpation or mobilizaiton joints above.  \par \par  \par \par A/P: lupus clinically stable. Will check labs toay. RTC in 4 months. Discussed about restarting plaquenil 200mg daily and decreasing prednisone to 2mg daily and further if tolerated.  \par \par Discussed with patient study on PPG/brain imaging. Discussed consent, patient was given the opportunity to read consent as well and ask questions. She agreed to participate.  \par \par  \par \par 22 cc: Zohreh is jeff for PPGstudy baseline. She feels well overall but still experiences periods of fatigue. She has been taking prednisone 3mg daily and plaquenil 200mg alternating days. She denies rashes, no Raynauds, no fever, no oral ulcers, no peripheral joint pain or swelling. No new medications. 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  \par \par Physical exam unremarkable \par \par Mild left cervical lymphonode ).5cm, non tender and mobile.  \par \par Lungs CTAB \par \par CV RR S1/S2 \par \par Abdomen soft, NT \par \par No peripheral swelling, FROM hands, wrists, elbows, shoulders, knees and ankles. No tenderness on palaption or mobilization joints above.  \par \par  \par \par A/P continue current maangement. Zohreh will have brain imaging today.  \par \par  \par \par  \par \par Ophthalmology evaluation 2018  Dec 2018

## 2022-04-15 ENCOUNTER — OUTPATIENT (OUTPATIENT)
Dept: OUTPATIENT SERVICES | Facility: HOSPITAL | Age: 27
LOS: 1 days | End: 2022-04-15
Payer: SUBSIDIZED

## 2022-04-15 ENCOUNTER — RESULT REVIEW (OUTPATIENT)
Age: 27
End: 2022-04-15

## 2022-04-15 ENCOUNTER — APPOINTMENT (OUTPATIENT)
Dept: MRI IMAGING | Facility: HOSPITAL | Age: 27
End: 2022-04-15

## 2022-04-15 DIAGNOSIS — Z00.00 ENCOUNTER FOR GENERAL ADULT MEDICAL EXAMINATION WITHOUT ABNORMAL FINDINGS: ICD-10-CM

## 2022-04-15 DIAGNOSIS — Z00.6 ENCOUNTER FOR EXAMINATION FOR NORMAL COMPARISON AND CONTROL IN CLINICAL RESEARCH PROGRAM: ICD-10-CM

## 2022-04-15 LAB — GLUCOSE BLDC GLUCOMTR-MCNC: 80 MG/DL — SIGNIFICANT CHANGE UP (ref 70–99)

## 2022-04-15 PROCEDURE — A9552: CPT

## 2022-04-15 PROCEDURE — 70552 MRI BRAIN STEM W/DYE: CPT | Mod: 26

## 2022-04-15 PROCEDURE — 78608 BRAIN IMAGING (PET): CPT

## 2022-04-15 PROCEDURE — 82962 GLUCOSE BLOOD TEST: CPT

## 2022-04-15 PROCEDURE — 70552 MRI BRAIN STEM W/DYE: CPT

## 2022-04-15 PROCEDURE — A9585: CPT

## 2022-05-25 NOTE — ED ADULT TRIAGE NOTE - BP NONINVASIVE DIASTOLIC (MM HG)
77 Hydroquinone Counseling:  Patient advised that medication may result in skin irritation, lightening (hypopigmentation), dryness, and burning.  In the event of skin irritation, the patient was advised to reduce the amount of the drug applied or use it less frequently.  Rarely, spots that are treated with hydroquinone can become darker (pseudoochronosis).  Should this occur, patient instructed to stop medication and call the office. The patient verbalized understanding of the proper use and possible adverse effects of hydroquinone.  All of the patient's questions and concerns were addressed.

## 2022-06-08 NOTE — PROGRESS NOTE ADULT - PROBLEM/PLAN-1
Problem: Adult Inpatient Plan of Care  Goal: Plan of Care Review  Outcome: Ongoing, Progressing  Flowsheets  Taken 6/8/2022 0310 by Manisha Bhatia, RN  Progress: improving  Outcome Evaluation: VSS throughout shift,  no c/o of chest pain or sob reported during shift, pt does report no recent bowel movement. will communicate with day shift RN to address with day shift MD. Will continue to monitor pt.    Plan of Care Reviewed With: patient      DISPLAY PLAN FREE TEXT

## 2022-06-10 ENCOUNTER — APPOINTMENT (OUTPATIENT)
Dept: RHEUMATOLOGY | Facility: CLINIC | Age: 27
End: 2022-06-10

## 2022-06-15 NOTE — ED PROVIDER NOTE - CROS ED ROS STATEMENT
Writer spoke with Brad from Crisis who stated that she will relay the information to their mobile staff to call back.    all other ROS negative except as per HPI

## 2022-06-22 NOTE — DISCHARGE NOTE ADULT - REASON FOR ADMISSION
A balloon catheter was inserted. Supply used: (Catheter Nc Trek 4mm 15mm Rx Radopq Smth Rnd Tip).  multiple joint pain, subjective fever

## 2022-09-02 NOTE — DISCHARGE NOTE ADULT - MEDICATION SUMMARY - MEDICATIONS TO CHANGE
I will SWITCH the dose or number of times a day I take the medications listed below when I get home from the hospital:  None patient/family

## 2022-09-22 NOTE — ED ADULT NURSE NOTE - EENT WDL
This RN at bedside.  Patient involuntarily pushing, called out to RN station to notify Dr Marcelina Street Eyes with no visual disturbances.  Ears clean and dry and no hearing difficulties. Nose with pink mucosa and no drainage.  Mouth mucous membranes moist and pink.  No tenderness or swelling to throat or neck.

## 2022-10-04 ENCOUNTER — APPOINTMENT (OUTPATIENT)
Dept: INTERNAL MEDICINE | Facility: CLINIC | Age: 27
End: 2022-10-04

## 2022-10-04 VITALS
SYSTOLIC BLOOD PRESSURE: 98 MMHG | WEIGHT: 119 LBS | HEIGHT: 65 IN | DIASTOLIC BLOOD PRESSURE: 67 MMHG | HEART RATE: 76 BPM | BODY MASS INDEX: 19.83 KG/M2 | TEMPERATURE: 98.4 F

## 2022-10-04 DIAGNOSIS — M32.9 SYSTEMIC LUPUS ERYTHEMATOSUS, UNSPECIFIED: ICD-10-CM

## 2022-10-04 DIAGNOSIS — Z00.00 ENCOUNTER FOR GENERAL ADULT MEDICAL EXAMINATION W/OUT ABNORMAL FINDINGS: ICD-10-CM

## 2022-10-04 PROCEDURE — 99385 PREV VISIT NEW AGE 18-39: CPT

## 2022-10-04 NOTE — HISTORY OF PRESENT ILLNESS
[FreeTextEntry1] : here to establish care \par has had   SLE since 2017 \par Worsening  neck pain - would like to start P.T

## 2022-10-05 DIAGNOSIS — D64.9 ANEMIA, UNSPECIFIED: ICD-10-CM

## 2022-10-05 LAB
ALBUMIN SERPL ELPH-MCNC: 3.8 G/DL
ALP BLD-CCNC: 90 U/L
ALT SERPL-CCNC: 12 U/L
ANION GAP SERPL CALC-SCNC: 9 MMOL/L
AST SERPL-CCNC: 25 U/L
BASOPHILS # BLD AUTO: 0.01 K/UL
BASOPHILS NFR BLD AUTO: 0.3 %
BILIRUB SERPL-MCNC: 0.2 MG/DL
BUN SERPL-MCNC: 12 MG/DL
CALCIUM SERPL-MCNC: 9.1 MG/DL
CHLORIDE SERPL-SCNC: 103 MMOL/L
CHOLEST SERPL-MCNC: 152 MG/DL
CO2 SERPL-SCNC: 26 MMOL/L
CREAT SERPL-MCNC: 0.61 MG/DL
EGFR: 126 ML/MIN/1.73M2
EOSINOPHIL # BLD AUTO: 0.15 K/UL
EOSINOPHIL NFR BLD AUTO: 4.7 %
ESTIMATED AVERAGE GLUCOSE: 105 MG/DL
GLUCOSE SERPL-MCNC: 80 MG/DL
HBA1C MFR BLD HPLC: 5.3 %
HCT VFR BLD CALC: 34.5 %
HDLC SERPL-MCNC: 35 MG/DL
HGB BLD-MCNC: 10.2 G/DL
IMM GRANULOCYTES NFR BLD AUTO: 0.3 %
LDLC SERPL CALC-MCNC: 95 MG/DL
LYMPHOCYTES # BLD AUTO: 1.62 K/UL
LYMPHOCYTES NFR BLD AUTO: 50.8 %
MAN DIFF?: NORMAL
MCHC RBC-ENTMCNC: 26.2 PG
MCHC RBC-ENTMCNC: 29.6 GM/DL
MCV RBC AUTO: 88.7 FL
MONOCYTES # BLD AUTO: 0.19 K/UL
MONOCYTES NFR BLD AUTO: 6 %
NEUTROPHILS # BLD AUTO: 1.21 K/UL
NEUTROPHILS NFR BLD AUTO: 37.9 %
NONHDLC SERPL-MCNC: 117 MG/DL
PLATELET # BLD AUTO: 216 K/UL
POTASSIUM SERPL-SCNC: 4.1 MMOL/L
PROT SERPL-MCNC: 8.3 G/DL
RBC # BLD: 3.89 M/UL
RBC # FLD: 15.1 %
SODIUM SERPL-SCNC: 138 MMOL/L
TRIGL SERPL-MCNC: 109 MG/DL
TSH SERPL-ACNC: 2.27 UIU/ML
WBC # FLD AUTO: 3.19 K/UL

## 2022-10-06 LAB
FERRITIN SERPL-MCNC: 21 NG/ML
FOLATE SERPL-MCNC: 9.5 NG/ML
VIT B12 SERPL-MCNC: 349 PG/ML

## 2022-10-06 RX ORDER — FERROUS SULFATE 15 MG/ML
75 (15 FE) DROPS ORAL DAILY
Qty: 3 | Refills: 0 | Status: ACTIVE | COMMUNITY
Start: 2022-10-06 | End: 1900-01-01

## 2022-12-07 ENCOUNTER — NON-APPOINTMENT (OUTPATIENT)
Age: 27
End: 2022-12-07

## 2023-01-01 NOTE — DIETITIAN INITIAL EVALUATION ADULT. - MD RECOMMEND
(2) more than 100 beats/min
Add 1 can Ensure Clear TID to Pescovegetarian diet, as medically feasible

## 2023-08-18 NOTE — PROGRESS NOTE ADULT - PROBLEM/PLAN-5
A pleasant 47 year old male presents today for routine physical examination.      His new concerns include:  Patient presents for adult wellness exam.  He has no concerns or complaints at this time.  No change to his past medical or family history.  Social history patient denies tobacco or illegal drug use.  He drinks alcohol rarely.  He is employed at [employer].        Tinnitus                                                      Essential (primary) hypertension                                TYMPANOSTOMY TUBE PLACEMENT                                   TONSILLECTOMY                                                 Current Outpatient Medications   Medication Sig Dispense Refill   • metoPROLOL tartrate (LOPRESSOR) 50 MG tablet TAKE 1 TABLET BY MOUTH IN THE MORNING AND AT BEDTIME 60 tablet 2   • Multiple Vitamin (MULTI-DAY PO)        No current facility-administered medications for this visit.       ALLERGIES:   Allergen Reactions   • Amlodipine SWELLING   • Cat Dander Other (See Comments)     Pt's eyes will swell up and nose will run       Social History     Socioeconomic History   • Marital status: /Civil Union     Spouse name: Not on file   • Number of children: Not on file   • Years of education: Not on file   • Highest education level: Not on file   Occupational History   • Not on file   Tobacco Use   • Smoking status: Never     Passive exposure: Yes   • Smokeless tobacco: Never   Substance and Sexual Activity   • Alcohol use: Yes     Comment: social   • Drug use: No   • Sexual activity: Not on file   Other Topics Concern   • Not on file   Social History Narrative   • Not on file     Social Determinants of Health     Financial Resource Strain: Not on file   Food Insecurity: Not on file   Transportation Needs: Not on file   Physical Activity: Not on file   Stress: Not on file   Social Connections: Not on file   Intimate Partner Violence: Not on file       Family History   Problem Relation Age of Onset   • 
Heart Father    • Alcohol Abuse Maternal Grandmother    • Cancer Maternal Grandfather         Lung    • Dementia/Alzheimers Paternal Grandfather    • Heart Half-Brother        REVIEW OF SYSTEMS:  14 point review of systems negative except as noted in the history of present illness.    PHYSICAL EXAMINATION:  GENERAL: Pleasant, cooperative, in no distress.  Visit Vitals  /88   Pulse 64   Resp 16   Ht 5' 7\" (1.702 m)   Wt 100.8 kg (222 lb 3.2 oz)   SpO2 96%   BMI 34.80 kg/m²     HEENT: Head normocephalic, atraumatic. Eyes: Extraocular movements  intact. Pupils are equally responsive and reactive to light. Conjunctivae  pink. Sclerae anicteric. Ears: External ears are normal. Tympanic  membranes and external auditory canals are normal. Nasopharyngeal mucosa  is normal. Oropharyngeal mucosa is normal.  NECK: Supple without mass. No thyromegaly or adenopathy. No carotid  bruits.  LUNGS: Lung fields are clear to auscultation and percussion.  CHEST WALL: No mass.  HEART: Regular rate and rhythm. Normal S1 and S2. No murmur, rub,  gallop, or click. No JVD(jugular venous distension) or hepatojugular reflux.  ABDOMEN: Soft and nontender. No mass. No hepatosplenomegaly.  GENITOURINARY: Testicles are descended bilaterally. No nodules. No  hernia. Phallus is normal.  DIGITAL RECTAL EXAM: No rectal mass. Prostate is not enlarged smooth and symmetric. Normal sphincter tone.  LOWER EXTREMITIES: No edema. Dorsalis pedis and posterior tibial pulses  are normal.  SKIN SCREEN: No dysplastic nevi or rash.  LYMPHATIC: No axillary, supraclavicular or inguinal adenopathy.  VASCULAR: No flank or femoral bruits.      Patients preventive care plan discussed and copy provided.  Health risk assessment reviewed. No evidence of cognitive decline on direct observation. Patients current care team is listed in the chart. Depression screen and health maintenance reviewed.  Patient is not taking opioids except as noted in the medication 
list.        ASSESSMENT AND PLAN:  Physical exam completed.   New issues covered include:  Well adult male.  CMP lipid panel ordered.  Follow-up with me annually.  Follow up sooner as needed  
DISPLAY PLAN FREE TEXT

## 2023-09-14 NOTE — ED ADULT NURSE NOTE - CHIEF COMPLAINT
The patient is a 23y Female complaining of Complex Repair And Dorsal Nasal Flap Text: The defect edges were debeveled with a #15 scalpel blade.  The primary defect was closed partially with a complex linear closure.  Given the location of the remaining defect, shape of the defect and the proximity to free margins a dorsal nasal flap was deemed most appropriate for complete closure of the defect.  Using a sterile surgical marker, an appropriate flap was drawn incorporating the defect and placing the expected incisions within the relaxed skin tension lines where possible.    The area thus outlined was incised deep to adipose tissue with a #15 scalpel blade.  The skin margins were undermined to an appropriate distance in all directions utilizing iris scissors.

## 2023-11-27 ENCOUNTER — LABORATORY RESULT (OUTPATIENT)
Age: 28
End: 2023-11-27

## 2023-11-27 ENCOUNTER — APPOINTMENT (OUTPATIENT)
Dept: RHEUMATOLOGY | Facility: CLINIC | Age: 28
End: 2023-11-27

## 2023-11-27 VITALS
BODY MASS INDEX: 20.99 KG/M2 | DIASTOLIC BLOOD PRESSURE: 76 MMHG | OXYGEN SATURATION: 99 % | RESPIRATION RATE: 16 BRPM | SYSTOLIC BLOOD PRESSURE: 104 MMHG | HEART RATE: 89 BPM | TEMPERATURE: 97.9 F | WEIGHT: 126.13 LBS

## 2024-01-15 NOTE — DISCHARGE NOTE ADULT - FUNCTIONAL SCREEN CURRENT LEVEL: BATHING, MLM
Left voicemail to remind pt of appointment on Wednesday January 17 @ 2:30 and to bring all  medications.    (0) independent

## 2024-03-08 NOTE — ED PROVIDER NOTE - NS ED MD DISPO ISOLATION TYPES
Detail Level: Detailed
Number Of Freeze-Thaw Cycles: 1 freeze-thaw cycle
Consent: The patient's consent was obtained including but not limited to risks of crusting, scabbing, blistering, scarring, darker or lighter pigmentary change, recurrence, incomplete removal and infection.
Show Aperture Variable?: Yes
Duration Of Freeze Thaw-Cycle (Seconds): 3
Post-Care Instructions: I reviewed with the patient in detail post-care instructions. Patient is to wear sunprotection, and avoid picking at any of the treated lesions. Pt may apply Vaseline to crusted or scabbing areas.
Render Post-Care Instructions In Note?: no
None

## 2024-05-28 NOTE — ED ADULT TRIAGE NOTE - AS O2 DELIVERY
[General Appearance - Alert] : alert [General Appearance - In No Acute Distress] : in no acute distress [General Appearance - Well Nourished] : well nourished [General Appearance - Well Developed] : well developed [Sclera] : the sclera and conjunctiva were normal [PERRL With Normal Accommodation] : pupils were equal in size, round, and reactive to light [Oropharynx] : the oropharynx was normal [Neck Appearance] : the appearance of the neck was normal [Respiration, Rhythm And Depth] : normal respiratory rhythm and effort [Decreased Breath Sounds] : decreased breath sounds [Apical Impulse] : the apical impulse was normal [Heart Rate And Rhythm] : heart rate was normal and rhythm regular room air [Heart Sounds] : normal S1 and S2 [Heart Sounds Gallop] : no gallops [Edema] : there was no peripheral edema [Bowel Sounds] : normal bowel sounds [Abdomen Soft] : soft [Abdomen Tenderness] : non-tender [Cervical Lymph Nodes Enlarged Posterior Bilaterally] : posterior cervical [Cervical Lymph Nodes Enlarged Anterior Bilaterally] : anterior cervical [Supraclavicular Lymph Nodes Enlarged Bilaterally] : supraclavicular [Erythema] : no erythema [Tonsils Erythema Right] : no erythema [FreeTextEntry1] : swollen nasal turbinates

## 2024-07-22 NOTE — HEALTH RISK ASSESSMENT
Refill request for: gabapentin (NEURONTIN) 300 MG capsule    Directions: Take 1 cap in AM, 1 cap at noon and 2 cap at bedtime     LOV: 7/10/23  NOV: Not scheduled- letter mailed to patient    30 day supply with 0 refills Medication T'd for review and signature  Criselda Spaulding CMA on 7/22/2024 at 4:13 PM  [facility]      [Good] : ~his/her~  mood as  good [Never] : Never [Yes] : Yes [Monthly or less (1 pt)] : Monthly or less (1 point) [0] : 2) Feeling down, depressed, or hopeless: Not at all (0) [de-identified] : mostly leg exercises - stair master / core / squats - 2-3 x / week  [de-identified] : REGULAR  [Patient reported PAP Smear was normal] : Patient reported PAP Smear was normal [Change in mental status noted] : No change in mental status noted [Alone] : lives alone [Employed] : employed [Single] : single [Sexually Active] : not sexually active [Feels Safe at Home] : Feels safe at home [Reports changes in hearing] : Reports no changes in hearing [Reports changes in vision] : Reports no changes in vision [Reports normal functional visual acuity (ie: able to read med bottle)] : Reports normal functional visual acuity [Reports changes in dental health] : Reports no changes in dental health [Smoke Detector] : smoke detector [Carbon Monoxide Detector] : carbon monoxide detector [Safety elements used in home] : safety elements used in home [Seat Belt] :  uses seat belt [PapSmearDate] : 2021 [PapSmearComments] : outside  [de-identified] : needs optho f/u bc HCQ

## 2024-12-25 NOTE — PATIENT PROFILE ADULT. - SOCIAL CONCERNS
Patient's blood pressure range in the last 24 hours was: BP  Min: 108/69  Max: 138/68.The patient's inpatient anti-hypertensive regimen is listed below:  Current Antihypertensives       Plan  - BP is controlled, no changes needed to their regimen  - HOLD home amlodipine overnight given concerns for acute infection - restart when clinically appropriate.    None

## 2025-02-07 NOTE — PROGRESS NOTE ADULT - I WAS PHYSICALLY PRESENT FOR THE KEY PORTIONS OF THE EVALUATION AND MANAGEMENT (E/M) SERVICE PROVIDED.  I AGREE WITH THE ABOVE HISTORY, PHYSICAL, AND PLAN WHICH I HAVE REVIEWED AND EDITED WHERE APPROPRIATE
The patient's elbows and knees were padded, heels floated, warming blanket given, and safety strap applied.
Statement Selected

## 2025-05-19 NOTE — PROGRESS NOTE ADULT - PROBLEM SELECTOR PLAN 2
ID input appreciated  no recent fever.  Reverse precautions advised and explained at length. 32.4 32.4